# Patient Record
Sex: FEMALE | Race: WHITE | NOT HISPANIC OR LATINO | URBAN - METROPOLITAN AREA
[De-identification: names, ages, dates, MRNs, and addresses within clinical notes are randomized per-mention and may not be internally consistent; named-entity substitution may affect disease eponyms.]

---

## 2023-07-26 PROBLEM — Z91.018 ALLERGY TO TREE NUTS: Status: ACTIVE | Noted: 2018-07-09

## 2023-07-26 PROBLEM — L70.9 ACNE: Status: ACTIVE | Noted: 2021-11-17

## 2023-07-26 PROBLEM — S63.92XA SPRAIN OF LEFT HAND: Status: ACTIVE | Noted: 2023-07-26

## 2023-07-26 PROBLEM — R06.02 EXERCISE-INDUCED SHORTNESS OF BREATH: Status: ACTIVE | Noted: 2023-07-26

## 2023-07-26 PROBLEM — J30.1 ALLERGIC RHINITIS DUE TO POLLEN: Status: ACTIVE | Noted: 2023-07-26

## 2023-07-26 PROBLEM — M25.561 RIGHT KNEE PAIN: Status: ACTIVE | Noted: 2023-07-26

## 2023-07-26 PROBLEM — E66.9 OBESITY: Status: ACTIVE | Noted: 2021-11-17

## 2023-07-26 PROBLEM — R06.09 DYSPNEA ON EXERTION: Status: ACTIVE | Noted: 2018-07-09

## 2023-07-26 PROBLEM — L30.9 ECZEMA: Status: ACTIVE | Noted: 2018-07-09

## 2023-07-26 PROBLEM — N92.6 ABNORMAL MENSTRUAL CYCLE: Status: ACTIVE | Noted: 2021-11-17

## 2023-07-26 PROBLEM — J38.3 VOCAL CORD DYSFUNCTION: Status: ACTIVE | Noted: 2023-07-26

## 2023-07-26 RX ORDER — SULFAMETHOXAZOLE AND TRIMETHOPRIM 800; 160 MG/1; MG/1
TABLET ORAL
COMMUNITY
Start: 2019-07-08 | End: 2023-07-27 | Stop reason: ALTCHOICE

## 2023-07-26 RX ORDER — CETIRIZINE HYDROCHLORIDE 10 MG/1
TABLET ORAL
COMMUNITY

## 2023-07-26 RX ORDER — ALBUTEROL SULFATE 90 UG/1
AEROSOL, METERED RESPIRATORY (INHALATION)
COMMUNITY
End: 2023-07-27 | Stop reason: ALTCHOICE

## 2023-07-26 RX ORDER — EPINEPHRINE 0.3 MG/.3ML
INJECTION SUBCUTANEOUS
COMMUNITY
Start: 2021-11-02 | End: 2023-07-27 | Stop reason: SDUPTHER

## 2023-07-26 RX ORDER — ETONOGESTREL AND ETHINYL ESTRADIOL VAGINAL RING .015; .12 MG/D; MG/D
1 RING VAGINAL
COMMUNITY
Start: 2021-11-19 | End: 2023-11-14 | Stop reason: SDUPTHER

## 2023-07-26 RX ORDER — MONTELUKAST SODIUM 5 MG/1
TABLET, CHEWABLE ORAL
COMMUNITY
End: 2023-07-27 | Stop reason: ALTCHOICE

## 2023-07-26 RX ORDER — TRIAMCINOLONE ACETONIDE 1 MG/G
OINTMENT TOPICAL
COMMUNITY
Start: 2019-07-26 | End: 2023-07-27 | Stop reason: SDUPTHER

## 2023-07-26 RX ORDER — ETONOGESTREL AND ETHINYL ESTRADIOL VAGINAL RING .015; .12 MG/D; MG/D
RING VAGINAL
COMMUNITY
Start: 2021-10-23 | End: 2023-07-27 | Stop reason: ALTCHOICE

## 2023-07-27 ENCOUNTER — OFFICE VISIT (OUTPATIENT)
Dept: PRIMARY CARE | Facility: CLINIC | Age: 23
End: 2023-07-27
Payer: COMMERCIAL

## 2023-07-27 VITALS
WEIGHT: 216 LBS | HEART RATE: 86 BPM | TEMPERATURE: 98.7 F | OXYGEN SATURATION: 98 % | SYSTOLIC BLOOD PRESSURE: 116 MMHG | BODY MASS INDEX: 31.99 KG/M2 | DIASTOLIC BLOOD PRESSURE: 72 MMHG | HEIGHT: 69 IN

## 2023-07-27 DIAGNOSIS — L30.9 ECZEMA, UNSPECIFIED TYPE: ICD-10-CM

## 2023-07-27 DIAGNOSIS — Z23 NEED FOR TDAP VACCINATION: ICD-10-CM

## 2023-07-27 DIAGNOSIS — Z00.00 PHYSICAL EXAM: Primary | ICD-10-CM

## 2023-07-27 DIAGNOSIS — Z91.018 ALLERGY TO TREE NUTS: ICD-10-CM

## 2023-07-27 PROCEDURE — 90715 TDAP VACCINE 7 YRS/> IM: CPT | Performed by: INTERNAL MEDICINE

## 2023-07-27 PROCEDURE — 1036F TOBACCO NON-USER: CPT | Performed by: INTERNAL MEDICINE

## 2023-07-27 PROCEDURE — 99385 PREV VISIT NEW AGE 18-39: CPT | Performed by: INTERNAL MEDICINE

## 2023-07-27 PROCEDURE — 90471 IMMUNIZATION ADMIN: CPT | Performed by: INTERNAL MEDICINE

## 2023-07-27 RX ORDER — TRIAMCINOLONE ACETONIDE 1 MG/G
OINTMENT TOPICAL 2 TIMES DAILY
Qty: 80 G | Refills: 1 | Status: SHIPPED | OUTPATIENT
Start: 2023-07-27

## 2023-07-27 RX ORDER — EPINEPHRINE 0.3 MG/.3ML
INJECTION SUBCUTANEOUS
Qty: 2 EACH | Refills: 3 | Status: SHIPPED | OUTPATIENT
Start: 2023-07-27

## 2023-07-27 NOTE — PROGRESS NOTES
"SUBJECTIVE:   Jessica Perez is a 22 y.o. female presenting for her annual physical/wellness.  PCP: Srinath Weeks MD  Physical.  Doing well  Just graduated.  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  On bcp    Colon screening: na  Last pap: sees gyn  Last mammogram: na  Dexa na  Vaccines Up to date. Needs tdap  Diet:   healthy in general  Exercises:  started running, 3-4x per week.  Hx of R knee ACL injury (high school), so is being careful, also does yoga, stretching    No results found for: \"HGBA1C\"  No results found for: \"CREATININE\"  No results found for: \"WBC\", \"HGB\", \"HCT\", \"MCV\", \"PLT\"  Lab Results   Component Value Date    CHOL 132 08/12/2019     Lab Results   Component Value Date    HDL 62.3 08/12/2019     No results found for: \"LDLCALC\"  Lab Results   Component Value Date    TRIG 57 08/12/2019     No components found for: \"CHOLHDL\"       ROS:   Feeling well. No dyspnea or chest pain on exertion. No abdominal pain, change in bowel habits, black or bloody stools. No urinary tract or  symptoms.  No breast concerns. No neurological complaints.    OBJECTIVE:   The patient appears well, alert, oriented x 3, in no distress.   /72   Pulse 86   Temp 37.1 °C (98.7 °F)   Ht 1.74 m (5' 8.5\")   Wt 98 kg (216 lb)   SpO2 98%   BMI 32.36 kg/m²   ENT normal.  Neck supple. No adenopathy or thyromegaly. NOEMY. Lungs are clear, good air entry, no wheezes, rhonchi or rales. S1 and S2 normal, no murmurs, regular rate and rhythm. Abdomen is soft without tenderness, guarding, mass or organomegaly.  exam deferred to Gyn. Extremities show no edema, normal peripheral pulses. Neurological is normal without focal findings.      1. Physical exam  CBC, Comprehensive Metabolic Panel, Hemoglobin A1C, Lipid Panel      2. Allergy to tree nuts  EPINEPHrine 0.3 mg/0.3 mL injection syringe      3. Eczema, unspecified " type  triamcinolone (Kenalog) 0.1 % ointment         tdap

## 2023-11-12 RX ORDER — ALBUTEROL SULFATE 90 UG/1
2 AEROSOL, METERED RESPIRATORY (INHALATION) EVERY 6 HOURS PRN
COMMUNITY

## 2023-11-12 RX ORDER — MONTELUKAST SODIUM 5 MG/1
5 TABLET, CHEWABLE ORAL NIGHTLY
COMMUNITY

## 2023-11-14 ENCOUNTER — OFFICE VISIT (OUTPATIENT)
Dept: OBSTETRICS AND GYNECOLOGY | Facility: CLINIC | Age: 23
End: 2023-11-14
Payer: COMMERCIAL

## 2023-11-14 VITALS
HEIGHT: 69 IN | SYSTOLIC BLOOD PRESSURE: 130 MMHG | DIASTOLIC BLOOD PRESSURE: 63 MMHG | BODY MASS INDEX: 34.21 KG/M2 | WEIGHT: 231 LBS

## 2023-11-14 DIAGNOSIS — Z30.44 ENCOUNTER FOR SURVEILLANCE OF VAGINAL RING HORMONAL CONTRACEPTIVE DEVICE: ICD-10-CM

## 2023-11-14 DIAGNOSIS — Z01.419 WELL WOMAN EXAM: Primary | ICD-10-CM

## 2023-11-14 PROCEDURE — 99395 PREV VISIT EST AGE 18-39: CPT | Performed by: NURSE PRACTITIONER

## 2023-11-14 PROCEDURE — 87661 TRICHOMONAS VAGINALIS AMPLIF: CPT | Performed by: NURSE PRACTITIONER

## 2023-11-14 PROCEDURE — 1036F TOBACCO NON-USER: CPT | Performed by: NURSE PRACTITIONER

## 2023-11-14 PROCEDURE — 87800 DETECT AGNT MULT DNA DIREC: CPT | Performed by: NURSE PRACTITIONER

## 2023-11-14 RX ORDER — ETONOGESTREL AND ETHINYL ESTRADIOL VAGINAL RING .015; .12 MG/D; MG/D
1 RING VAGINAL
Qty: 1 EACH | Refills: 12 | Status: SHIPPED | OUTPATIENT
Start: 2023-11-14

## 2023-11-14 ASSESSMENT — ENCOUNTER SYMPTOMS
RESPIRATORY NEGATIVE: 1
ALLERGIC/IMMUNOLOGIC NEGATIVE: 1
PSYCHIATRIC NEGATIVE: 1
ENDOCRINE NEGATIVE: 1
HEMATOLOGIC/LYMPHATIC NEGATIVE: 1
CONSTITUTIONAL NEGATIVE: 1
CARDIOVASCULAR NEGATIVE: 1
MUSCULOSKELETAL NEGATIVE: 1
EYES NEGATIVE: 1
GASTROINTESTINAL NEGATIVE: 1
HEADACHES: 1

## 2023-11-14 ASSESSMENT — ANXIETY QUESTIONNAIRES
7. FEELING AFRAID AS IF SOMETHING AWFUL MIGHT HAPPEN: NOT AT ALL
4. TROUBLE RELAXING: NOT AT ALL
6. BECOMING EASILY ANNOYED OR IRRITABLE: NOT AT ALL
GAD7 TOTAL SCORE: 2
2. NOT BEING ABLE TO STOP OR CONTROL WORRYING: NOT AT ALL
IF YOU CHECKED OFF ANY PROBLEMS ON THIS QUESTIONNAIRE, HOW DIFFICULT HAVE THESE PROBLEMS MADE IT FOR YOU TO DO YOUR WORK, TAKE CARE OF THINGS AT HOME, OR GET ALONG WITH OTHER PEOPLE: NOT DIFFICULT AT ALL
3. WORRYING TOO MUCH ABOUT DIFFERENT THINGS: SEVERAL DAYS
1. FEELING NERVOUS, ANXIOUS, OR ON EDGE: SEVERAL DAYS
5. BEING SO RESTLESS THAT IT IS HARD TO SIT STILL: NOT AT ALL

## 2023-11-14 ASSESSMENT — PATIENT HEALTH QUESTIONNAIRE - PHQ9: 2. FEELING DOWN, DEPRESSED OR HOPELESS: SEVERAL DAYS

## 2023-11-14 NOTE — PROGRESS NOTES
"Aurora Bui Kelton, APRN-CNP     Subjective   Jessica Perez is a 23 y.o. female who presents for annual exam.   In the past year she has not had any surgical procedures or new health diagnoses.    She does have some complaint of discomfort with sexual intercourse.  She has not changed sexual partners.  There has been some vaginal dryness on occasion.    She has graduated from college with a degree in political science and is looking for a job.  Past Medical History:   Diagnosis Date    Allergic     Asthma     Eczema      Past Surgical History:   Procedure Laterality Date    WISDOM TOOTH EXTRACTION  2019       OB History          0    Para   0    Term   0       0    AB   0    Living   0         SAB   0    IAB   0    Ectopic   0    Multiple   0    Live Births   0               Patient's last menstrual period was 11/10/2023.      Review of Systems   Constitutional: Negative.    HENT: Negative.     Eyes: Negative.    Respiratory: Negative.     Cardiovascular: Negative.    Gastrointestinal: Negative.    Endocrine: Negative.    Genitourinary: Negative.    Musculoskeletal: Negative.    Skin: Negative.    Allergic/Immunologic: Negative.    Neurological:  Positive for headaches.   Hematological: Negative.    Psychiatric/Behavioral: Negative.     All other systems reviewed and are negative.    Breast: No Complaints   Vaginal: Dry        Objective   /63   Ht 1.753 m (5' 9\")   Wt 105 kg (231 lb)   LMP 11/10/2023   BMI 34.11 kg/m²   Physical Exam  Constitutional:       Appearance: Normal appearance.   Genitourinary:      Vulva and rectum normal.      No vaginal prolapse present.       Right Adnexa: no mass present.     Left Adnexa: no mass present.     Uterus is not enlarged, tender or irregular.   Breasts:     Breasts are soft.     Right: Normal.      Left: Normal.   Cardiovascular:      Rate and Rhythm: Normal rate.   Pulmonary:      Effort: Pulmonary effort is normal.      Breath " sounds: Normal breath sounds.   Musculoskeletal:         General: Normal range of motion.   Neurological:      General: No focal deficit present.      Mental Status: She is alert.   Skin:     General: Skin is warm and dry.   Vitals and nursing note reviewed.                 Assessment/Plan   Problem List Items Addressed This Visit    None  Visit Diagnoses         Codes    Well woman exam    -  Primary Z01.419    Relevant Orders    C. Trachomatis / N. Gonorrhoeae, Amplified Detection    TRICH VAGINALIS, AMPLIFIED    Encounter for surveillance of vaginal ring hormonal contraceptive device     Z30.44    Relevant Medications    etonogestreL-ethinyl estradioL (Nuvaring) 0.12-0.015 mg/24 hr vaginal ring

## 2023-11-16 LAB
C TRACH RRNA SPEC QL NAA+PROBE: NEGATIVE
N GONORRHOEA DNA SPEC QL PROBE+SIG AMP: NEGATIVE
T VAGINALIS RRNA SPEC QL NAA+PROBE: NEGATIVE

## 2023-11-21 DIAGNOSIS — M25.551 RIGHT HIP PAIN: Primary | ICD-10-CM

## 2023-11-22 ENCOUNTER — HOSPITAL ENCOUNTER (OUTPATIENT)
Dept: RADIOLOGY | Facility: HOSPITAL | Age: 23
Discharge: HOME | End: 2023-11-22
Payer: COMMERCIAL

## 2023-11-22 ENCOUNTER — OFFICE VISIT (OUTPATIENT)
Dept: ORTHOPEDIC SURGERY | Facility: HOSPITAL | Age: 23
End: 2023-11-22
Payer: COMMERCIAL

## 2023-11-22 VITALS — WEIGHT: 230 LBS | BODY MASS INDEX: 34.86 KG/M2 | HEIGHT: 68 IN

## 2023-11-22 DIAGNOSIS — M25.551 RIGHT HIP PAIN: ICD-10-CM

## 2023-11-22 DIAGNOSIS — M25.851 FEMOROACETABULAR IMPINGEMENT OF RIGHT HIP: Primary | ICD-10-CM

## 2023-11-22 PROCEDURE — 1036F TOBACCO NON-USER: CPT | Performed by: ORTHOPAEDIC SURGERY

## 2023-11-22 PROCEDURE — 99204 OFFICE O/P NEW MOD 45 MIN: CPT | Performed by: ORTHOPAEDIC SURGERY

## 2023-11-22 PROCEDURE — 73502 X-RAY EXAM HIP UNI 2-3 VIEWS: CPT | Mod: RT

## 2023-11-22 PROCEDURE — 99214 OFFICE O/P EST MOD 30 MIN: CPT | Performed by: ORTHOPAEDIC SURGERY

## 2023-11-22 PROCEDURE — 73502 X-RAY EXAM HIP UNI 2-3 VIEWS: CPT | Mod: RIGHT SIDE | Performed by: RADIOLOGY

## 2023-11-22 RX ORDER — MELOXICAM 15 MG/1
15 TABLET ORAL DAILY
Qty: 14 TABLET | Refills: 0 | Status: SHIPPED | OUTPATIENT
Start: 2023-11-22 | End: 2023-12-06

## 2023-11-22 ASSESSMENT — PAIN DESCRIPTION - DESCRIPTORS: DESCRIPTORS: SHARP

## 2023-11-22 ASSESSMENT — PAIN SCALES - GENERAL: PAINLEVEL_OUTOF10: 4

## 2023-11-22 ASSESSMENT — PAIN - FUNCTIONAL ASSESSMENT: PAIN_FUNCTIONAL_ASSESSMENT: 0-10

## 2023-11-29 NOTE — PROGRESS NOTES
HPI    This is a 23 y.o. female today complaining of right hip pain.  Pain has been present for 2 mos.  It is located deep in the groin and described as sharp, pinching, and achy.    They do complain of catching/clicking.  Pain is worse with prolonged sitting and increased activity.   They have utilized anti-inflammatories, rest, and ice for 2 mos  but their pain persists.  They rate their pain a 5 out of 10 on a daily basis.      ROS  Review of systems reviewed and pertinent positives mentioned in HPI.      PHYSICAL EXAM  There is not  pain with a resisted situp.    There is not abdominal distention or tenderness    The patient's range of motion reveals that they have 90° of hip flexion on the affected side.   Hip extension to 10°   Abduction to 45°      The patient is 5 out of 5 strength with resisted hip AB, adduction, hamstring and quadriceps testing.    No pain over the hip flexor, ASIS.  No pain over the proximal hamstring, piriformis      Pain Provacation testing:    Positive impingement sign,with a painful arc from 12 to 3:00.  Negative Psoas impingement/Kameron test  Negative instability, Log roll.  Positive subspine impingement test, which is pain with straight hip flexion.    Negative straight leg raise.  Negative circumduction clunk.      Peritrochanteric space examination:  Tenderness over the lon-trochanteric space - No  pain over the posterior trochanter - No      IMAGING  X-rays reviewed reveal no gross fracture or dislocation. and evidence of femoral acetabular impingement with an alpha angle of 70.  Acetabular index of 5.7  without acetabular retroversion.  Lateral center edge of 28.9.    MRI reviewed reveals No MRI available for review      ASSESSMENT  Right  femoral acetabular impingement      PLAN  This is a 23 y.o. female patient here today with significant hip pain.  We will have the patient initiate a course of physical therapy and continue NSAIDs and activity modification.  If symptoms  persist, we will see them back for re-evaluation.          Deacon Norton MD

## 2023-12-13 ENCOUNTER — EVALUATION (OUTPATIENT)
Dept: PHYSICAL THERAPY | Facility: CLINIC | Age: 23
End: 2023-12-13
Payer: COMMERCIAL

## 2023-12-13 DIAGNOSIS — M25.851 FEMOROACETABULAR IMPINGEMENT OF RIGHT HIP: Primary | ICD-10-CM

## 2023-12-13 PROCEDURE — 97110 THERAPEUTIC EXERCISES: CPT | Mod: GP | Performed by: PHYSICAL THERAPIST

## 2023-12-13 PROCEDURE — 97161 PT EVAL LOW COMPLEX 20 MIN: CPT | Mod: GP | Performed by: PHYSICAL THERAPIST

## 2023-12-13 ASSESSMENT — ENCOUNTER SYMPTOMS
LOSS OF SENSATION IN FEET: 0
OCCASIONAL FEELINGS OF UNSTEADINESS: 0
DEPRESSION: 0

## 2023-12-13 ASSESSMENT — COLUMBIA-SUICIDE SEVERITY RATING SCALE - C-SSRS
6. HAVE YOU EVER DONE ANYTHING, STARTED TO DO ANYTHING, OR PREPARED TO DO ANYTHING TO END YOUR LIFE?: NO
1. IN THE PAST MONTH, HAVE YOU WISHED YOU WERE DEAD OR WISHED YOU COULD GO TO SLEEP AND NOT WAKE UP?: NO
2. HAVE YOU ACTUALLY HAD ANY THOUGHTS OF KILLING YOURSELF?: NO

## 2023-12-13 NOTE — PROGRESS NOTES
"Physical Therapy Evaluation    Patient Name: Jessica Perez  MRN: [id number]  Today's Date: 12/13/2023  Visit: 1  Referred by: Dr. Norton  Diagnosis: DOO- 10-1-23  1. Femoroacetabular impingement of right hip          SUBJECTIVE:  23 y.o. english speaking female with R)>L) hip pain deep in the joint.  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  Pain started gradually and has progressed since.  3-4/10 pinching pain.  Mostly with flexing the hip past 90* or crossing legs.  (-) night pain    Pain:  Deep anterior hip joint pain R>L    Prior level of function:  Former HS and college  ([employer]).    OBJECTIVE:  ROM:  ER -R) 40, L) 50  IR- R) 40, L) 42  F- R) 95, L) 100  EXT- R) 10, L) 15  (+) FADIR bilaterally  (+) hip impingement 12-3:00 bilaterally  (-) ELIE  Resisted movements of hip were strong and painless except for ache with resisted hip flexion bilaterally and 4/5.  4/5 hip extension and ABD.  TTP over ASIS and hip flexors bilat.  Mild valgus collapse with single leg mini-squat.  Bilat.    Outcome Measure:  HOS- 76%    ASSESSMENT:  Pt. With painful limited ROM of R) hip, decreased tolerances for ADLs, and weakness in LE and core muscles.  She requires PT to address these issues and     TREATMENT:  - Therex:  Marshal test hip flexor S- 60\" ea.  Bent leg fallouts 15\" x 5 ea.  Planks x 30\"   Side planks x 20\" ea.  4-pt sit backs 10\" x 10  Prone hip ER/IR x 30  Bent knee prone hip EXT 2 x 10   SL clam shells 2 x 10 ea.    - Manual Therapy:    PATIENT EDUCATION:  HEP    PLAN:   Daily HEP.  Ice prn  Plan for weekly PT 1-2x/week x 8 weeks to work on pain free ROM along with core and LE strength.  Rehab potential: good  Plan of care agreement: Y    GOALS:  Active       PT Problem       Pt will have improved pain free ROM to assist with improving functional tolerances       Start:  12/13/23    Expected End:  03/01/24            Pt will have improved LE strength to assist " with improving functional tolerances       Start:  12/13/23    Expected End:  03/01/24            Pt will have improved HOS score by at least 15%       Start:  12/13/23    Expected End:  03/01/24            Pt will report improved ability with sustained postures and sustained ADLs       Start:  12/13/23    Expected End:  03/01/24            Pt will be independent with HEP       Start:  12/13/23    Expected End:  01/12/24            Pt will have decreased reports of pain by at least 2 levels using a VAS       Start:  12/13/23    Expected End:  01/19/24

## 2023-12-20 ENCOUNTER — TREATMENT (OUTPATIENT)
Dept: PHYSICAL THERAPY | Facility: CLINIC | Age: 23
End: 2023-12-20
Payer: COMMERCIAL

## 2023-12-20 DIAGNOSIS — M25.851 FEMOROACETABULAR IMPINGEMENT OF RIGHT HIP: ICD-10-CM

## 2023-12-20 PROCEDURE — 97110 THERAPEUTIC EXERCISES: CPT | Mod: GP | Performed by: PHYSICAL THERAPIST

## 2023-12-20 PROCEDURE — 97140 MANUAL THERAPY 1/> REGIONS: CPT | Mod: GP | Performed by: PHYSICAL THERAPIST

## 2023-12-20 NOTE — PROGRESS NOTES
"Physical Therapy Evaluation    Patient Name: Jessica Perez  MRN: [id number]  Today's Date: 12/20/2023  Visit: 2  Referred by: Dr. Norton  Diagnosis: DOO- 10-1-23  1. Femoroacetabular impingement of right hip  Follow Up In Physical Therapy        SUBJECTIVE:  23 y.o. female who returns for follow up for R)>L) hip pain deep in the joint.   3-4/10 pinching pain.  Symptoms are about the same.  Still mostly with flexing the hip past 90* or crossing legs.    HEP- Y  Pain:  Deep anterior hip joint pain R>L    Former HS and college  ([employer]).    OBJECTIVE:  ROM:  ER -R) 40, L) 50  IR- R) 40, L) 42  F- R) 95, L) 100  EXT- R) 10, L) 15  (+) FADIR bilaterally  (+) hip impingement 12-3:00 bilaterally  (-) ELIE  Resisted movements of hip were strong and painless except for ache with resisted hip flexion bilaterally and 4/5.  4/5 hip extension and ABD.  TTP over ASIS and hip flexors bilat.  Mild valgus collapse with single leg mini-squat.  Bilat.    Outcome Measure:  HOS- 76%    ASSESSMENT:  Pt. Tolerating ex. And treatment so far.  Tolerated today's session without increased pain reported during or immediately after session.    TREATMENT:  - Therex:  Elliptical L6 x 5 min.  Marshal test hip flexor S- 60\" ea.  Bent leg fallouts 15\" x 5 ea.  Planks x 30\" NP  Side planks x 20\" ea. NP  4-pt sit backs 10\" x 10   MultiHip EXT 7 2x10 ea.  MultiHip ABD 4 2x10 ea.  Prone hip ER/IR x 30 ea.  Prone MULE KICK hip EXT 2 x 15EA.   SL clam shells 2 x 15 ea.    - Manual Therapy:  IASTM to hip flexors and anterior hips  Belt lateral distraction mobilization to hips  P/A mob to both hips  PLAN:   Continue daily HEP.  Ice prn  F/U next week and continue to progress towards PT goals.  Rehab potential: good  Plan of care agreement: Y    GOALS:  Active       PT Problem       Pt will have improved pain free ROM to assist with improving functional tolerances       Start:  12/13/23    Expected End:  03/01/24            Pt will have " improved LE strength to assist with improving functional tolerances       Start:  12/13/23    Expected End:  03/01/24            Pt will have improved HOS score by at least 15%       Start:  12/13/23    Expected End:  03/01/24            Pt will report improved ability with sustained postures and sustained ADLs       Start:  12/13/23    Expected End:  03/01/24            Pt will be independent with HEP       Start:  12/13/23    Expected End:  01/12/24            Pt will have decreased reports of pain by at least 2 levels using a VAS       Start:  12/13/23    Expected End:  01/19/24

## 2023-12-28 ENCOUNTER — TREATMENT (OUTPATIENT)
Dept: PHYSICAL THERAPY | Facility: CLINIC | Age: 23
End: 2023-12-28
Payer: COMMERCIAL

## 2023-12-28 DIAGNOSIS — M25.851 FEMOROACETABULAR IMPINGEMENT OF RIGHT HIP: ICD-10-CM

## 2023-12-28 PROCEDURE — 97140 MANUAL THERAPY 1/> REGIONS: CPT | Mod: GP | Performed by: PHYSICAL THERAPIST

## 2023-12-28 PROCEDURE — 97110 THERAPEUTIC EXERCISES: CPT | Mod: GP | Performed by: PHYSICAL THERAPIST

## 2023-12-28 NOTE — PROGRESS NOTES
"Physical Therapy Evaluation    Patient Name: Jessica Perez  MRN: [id number]  Today's Date: 12/28/2023  Visit: 3  Referred by: Dr. Norton  Diagnosis: DOO- 10-1-23  1. Femoroacetabular impingement of right hip  Follow Up In Physical Therapy        SUBJECTIVE:  23 y.o. female who returns for follow up for R)>L) hip pain deep in the joint.  Symptoms have been better past week.  No pinching today.    Still mostly with flexing the hip past 90* or crossing legs.    HEP- Y  Pain:  Deep anterior hip joint pain R>L    Former HS and college  ([employer]).    OBJECTIVE:  (+) FADIR bilaterally  (+) hip impingement 12-3:00 bilaterally  Resisted movements of hip were strong and painless except for ache with resisted hip flexion bilaterally and 4/5.  4/5 hip extension and ABD.  TTP over ASIS and hip flexors bilat.    Outcome Measure:  HOS- 76%    ASSESSMENT:  Pt. With decreased symptoms reported today.  Pt. Tolerating ex. And treatment so far.  Tolerated today's session without increased pain reported during or immediately after session.    TREATMENT:  - Therex:  Elliptical L6 x 5 min.  Marshal test hip flexor S- 60\" ea.  Bent leg fallouts 15\" x 5 ea.  Planks x 30\" NP  Side planks x 20\" ea. NP  4-pt sit backs 10\" x 10   MultiHip EXT 7 2x10 ea.  MultiHip ABD 4 2x10 ea.  Prone MULE KICK hip EXT 3 x 12 EA.   SL clam shells   R  3 x 10 ea.  HL isometric hip flexion  15\" x 5  Slider retro lunges x 15 eliseo.      - Manual Therapy:  IASTM to hip flexors and anterior hips  Belt lateral distraction mobilization to hips  P/A mob to both hips  PLAN:   Continue daily HEP.  Start hip flexor Isometrics 2x/day.  F/U next week and continue to progress towards PT goals.    GOALS:  Active       PT Problem       Pt will have improved pain free ROM to assist with improving functional tolerances       Start:  12/13/23    Expected End:  03/01/24            Pt will have improved LE strength to assist with improving functional tolerances       " Start:  12/13/23    Expected End:  03/01/24            Pt will have improved HOS score by at least 15%       Start:  12/13/23    Expected End:  03/01/24            Pt will report improved ability with sustained postures and sustained ADLs       Start:  12/13/23    Expected End:  03/01/24            Pt will be independent with HEP       Start:  12/13/23    Expected End:  01/12/24            Pt will have decreased reports of pain by at least 2 levels using a VAS       Start:  12/13/23    Expected End:  01/19/24

## 2024-01-04 ENCOUNTER — TREATMENT (OUTPATIENT)
Dept: PHYSICAL THERAPY | Facility: CLINIC | Age: 24
End: 2024-01-04
Payer: COMMERCIAL

## 2024-01-04 DIAGNOSIS — M25.851 FEMOROACETABULAR IMPINGEMENT OF RIGHT HIP: Primary | ICD-10-CM

## 2024-01-04 PROCEDURE — 97140 MANUAL THERAPY 1/> REGIONS: CPT | Mod: GP | Performed by: PHYSICAL THERAPIST

## 2024-01-04 PROCEDURE — 97110 THERAPEUTIC EXERCISES: CPT | Mod: GP | Performed by: PHYSICAL THERAPIST

## 2024-01-04 NOTE — PROGRESS NOTES
"Physical Therapy Evaluation    Patient Name: Jessica Perez  MRN: [id number]  Today's Date: 1/4/2024  Visit: 4  Referred by: Dr. Norton  Diagnosis: DOO- 10-1-23  1. Femoroacetabular impingement of right hip          SUBJECTIVE:  23 y.o. female who returns for follow up for R)>L) hip pain deep in the joint.  Symptoms continue to feel better. Little pinching since last session.       HEP- Y  Pain:  Deep anterior hip joint pain R>L    Former HS and college  ([employer]).    OBJECTIVE:  (+) FADIR bilaterally  (+) hip impingement 12-3:00 bilaterally  Resisted movements of hip were strong and painless except for ache with resisted hip flexion bilaterally and 4/5.  4/5 hip extension and ABD.  TTP over ASIS and hip flexors bilat.    Outcome Measure:  HOS- 76%    ASSESSMENT:  Pt. Tolerating ex. And treatment so far.  Tolerated today's session without increased pain reported during or immediately after session.    TREATMENT:  - Therex:  Elliptical L6 x 5 min.  Marshal test hip flexor S- 60\" ea.  Strap prone quad S x 60\" ea.  Planks x 30\" NP  Side planks x 20\" ea. NP  4-pt sit backs 10\" x 10   SL clam shells   G  3 x 10 ea.  HL isometric hip flexion  15\" x 5 ea.  Airex SLSB knee flexed x 30 sec. X 2 ea.  MultiHip EXT 8 3x10 ea.  MultiHip ABD 5 3x10 ea.  SL DL 3x10 ea.  Slider retro lunges  3x10 eliseo.  TB lateral stepping  G  20' x 3 ea.      - Manual Therapy:  IASTM to hip flexors and anterior hips  Belt lateral distraction mobilization to hips  P/A mob to both hips  PLAN:   Continue daily HEP.  Start hip flexor Isometrics 2x/day.  F/U next week and continue to progress towards PT goals.    GOALS:  Active       PT Problem       Pt will have improved pain free ROM to assist with improving functional tolerances       Start:  12/13/23    Expected End:  03/01/24            Pt will have improved LE strength to assist with improving functional tolerances       Start:  12/13/23    Expected End:  03/01/24            Pt will " have improved HOS score by at least 15%       Start:  12/13/23    Expected End:  03/01/24            Pt will report improved ability with sustained postures and sustained ADLs       Start:  12/13/23    Expected End:  03/01/24            Pt will be independent with HEP       Start:  12/13/23    Expected End:  01/12/24            Pt will have decreased reports of pain by at least 2 levels using a VAS       Start:  12/13/23    Expected End:  01/19/24

## 2024-01-17 ENCOUNTER — OFFICE VISIT (OUTPATIENT)
Dept: ORTHOPEDIC SURGERY | Facility: HOSPITAL | Age: 24
End: 2024-01-17
Payer: COMMERCIAL

## 2024-01-17 DIAGNOSIS — M25.851 FEMOROACETABULAR IMPINGEMENT OF RIGHT HIP: ICD-10-CM

## 2024-01-17 PROCEDURE — 99213 OFFICE O/P EST LOW 20 MIN: CPT | Performed by: ORTHOPAEDIC SURGERY

## 2024-01-17 PROCEDURE — 1036F TOBACCO NON-USER: CPT | Performed by: ORTHOPAEDIC SURGERY

## 2024-01-17 ASSESSMENT — PAIN - FUNCTIONAL ASSESSMENT: PAIN_FUNCTIONAL_ASSESSMENT: 0-10

## 2024-01-17 ASSESSMENT — PAIN SCALES - GENERAL: PAINLEVEL_OUTOF10: 3

## 2024-01-17 ASSESSMENT — PAIN DESCRIPTION - DESCRIPTORS: DESCRIPTORS: ACHING;SHARP

## 2024-01-23 NOTE — PROGRESS NOTES
Patient returns to see me today physical therapy and utilizing anti-inflammatories over the last several months that has failed to respond to these appropriately.  She has known femoral acetabular impingement and is interested in proceeding with an MRI arthrogram to further evaluate the integrity of her labrum.  We will plan to order this imaging study and see her back after it is performed for further conversations regarding surgical intervention.

## 2024-02-22 ENCOUNTER — APPOINTMENT (OUTPATIENT)
Dept: RADIOLOGY | Facility: HOSPITAL | Age: 24
End: 2024-02-22
Payer: COMMERCIAL

## 2024-03-13 ENCOUNTER — HOSPITAL ENCOUNTER (OUTPATIENT)
Dept: RADIOLOGY | Facility: HOSPITAL | Age: 24
Discharge: HOME | End: 2024-03-13
Payer: COMMERCIAL

## 2024-03-13 DIAGNOSIS — M25.851 FEMOROACETABULAR IMPINGEMENT OF RIGHT HIP: ICD-10-CM

## 2024-03-13 PROCEDURE — 73525 CONTRAST X-RAY OF HIP: CPT | Mod: RT

## 2024-03-13 PROCEDURE — 27093 INJECTION FOR HIP X-RAY: CPT | Mod: RIGHT SIDE | Performed by: RADIOLOGY

## 2024-03-13 PROCEDURE — 2500000005 HC RX 250 GENERAL PHARMACY W/O HCPCS

## 2024-03-13 PROCEDURE — A9575 INJ GADOTERATE MEGLUMI 0.1ML: HCPCS | Performed by: ORTHOPAEDIC SURGERY

## 2024-03-13 PROCEDURE — 73722 MRI JOINT OF LWR EXTR W/DYE: CPT | Mod: RIGHT SIDE | Performed by: STUDENT IN AN ORGANIZED HEALTH CARE EDUCATION/TRAINING PROGRAM

## 2024-03-13 PROCEDURE — 2550000001 HC RX 255 CONTRASTS: Performed by: ORTHOPAEDIC SURGERY

## 2024-03-13 PROCEDURE — 77002 NEEDLE LOCALIZATION BY XRAY: CPT | Mod: RIGHT SIDE | Performed by: RADIOLOGY

## 2024-03-13 PROCEDURE — 73722 MRI JOINT OF LWR EXTR W/DYE: CPT | Mod: RT

## 2024-03-13 RX ORDER — GADOTERATE MEGLUMINE 376.9 MG/ML
0.1 INJECTION INTRAVENOUS
Status: COMPLETED | OUTPATIENT
Start: 2024-03-13 | End: 2024-03-13

## 2024-03-13 RX ORDER — LIDOCAINE HYDROCHLORIDE 10 MG/ML
INJECTION INFILTRATION; PERINEURAL
Status: COMPLETED
Start: 2024-03-13 | End: 2024-03-13

## 2024-03-13 RX ADMIN — LIDOCAINE HYDROCHLORIDE 5 ML: 10 INJECTION, SOLUTION INFILTRATION; PERINEURAL at 15:01

## 2024-03-13 RX ADMIN — GADOTERATE MEGLUMINE 0.1 ML: 376.9 INJECTION INTRAVENOUS at 15:01

## 2024-03-13 RX ADMIN — IOHEXOL 5 ML: 240 INJECTION, SOLUTION INTRATHECAL; INTRAVASCULAR; INTRAVENOUS; ORAL at 15:01

## 2024-03-13 NOTE — POST-PROCEDURE NOTE
Interventional Radiology Brief Postprocedure Note    Attending: Faiza De Dios    Assistant: n/a    Diagnosis: pain    Description of procedure: The risks, benefits and alternatives of the procedure were discussed with the patient. The patient was given the chance to ask questions, and all were answered prior to proceeding. At this time, written informed consent was obtained.      The patient was placed in the supine position on the fluoroscopic table and was prepped and draped in the usual sterile fashion. The right hip joint was localized under fluoroscopy. Lidocaine was used for local anesthesia. Under fluoroscopic guidance a 20 gauge needle was inserted into the right hip joint. Approximately 10 ML of a solution containing lidocaine, Omnipaque 240 iodinated contrast and Multihance gadolinium contrast was injected into the right hip joint.      The patient tolerated the procedure well and no immediate complication was noted.      Total fluoroscopy time was [8 seconds.].     Anesthesia:  Local    Complications: None    Estimated Blood Loss: none    Medications (Filter: Administrations occurring from 1637 to 1637 on 03/13/24) As of 03/13/24 1637      None          No specimens collected      See detailed result report with images in PACS.    The patient tolerated the procedure well without incident or complication and is in stable condition.

## 2024-04-10 ENCOUNTER — OFFICE VISIT (OUTPATIENT)
Dept: ORTHOPEDIC SURGERY | Facility: HOSPITAL | Age: 24
End: 2024-04-10
Payer: COMMERCIAL

## 2024-04-10 DIAGNOSIS — S73.191A ACETABULAR LABRUM TEAR, RIGHT, INITIAL ENCOUNTER: ICD-10-CM

## 2024-04-10 PROCEDURE — L1686 HO POST-OP HIP ABDUCTION: HCPCS | Performed by: ORTHOPAEDIC SURGERY

## 2024-04-10 PROCEDURE — 99213 OFFICE O/P EST LOW 20 MIN: CPT | Performed by: ORTHOPAEDIC SURGERY

## 2024-04-10 ASSESSMENT — PAIN SCALES - GENERAL: PAINLEVEL_OUTOF10: 3

## 2024-04-10 ASSESSMENT — PAIN - FUNCTIONAL ASSESSMENT: PAIN_FUNCTIONAL_ASSESSMENT: 0-10

## 2024-04-10 ASSESSMENT — PAIN DESCRIPTION - DESCRIPTORS: DESCRIPTORS: SHARP;SHOOTING

## 2024-04-11 NOTE — PROGRESS NOTES
HPI    23 y.o. female here today for follow up on Right hip pain.  She has failed conservative management for her back I do not close anti-inflammatories and activity for at least 6 months.  She recently got an MRI scan and is here to review the results and options for her hip        IMAGING  X-rays reviewed reveal no gross fracture or dislocation. and evidence of femoral acetabular impingement with an alpha angle of 70.  Acetabular index of 5.7  without acetabular retroversion.  Lateral center edge of 28.9.   MRI - tearing of the acetabular labrum as well as potential cartilaginous loose body and some early delamination of the articular cartilage on the acetabular side but no full-thickness defect noted        ASSESSMENT  femoral acetabular impingement        PLAN  Patient has exhausted conservative management including therapeutic exercises, activity modification, NSAIDS.  They continue to have worsening deep groin pain with mechanical symptoms affecting ADLs.  Patient would like to proceed with surgery entailing a hip arthroscopy, acetabuloplasty for acetabular retroversion, labral repair, femoral osteoplasty, loose body removal for possible cartilaginous loose body seen on MRI, and capsular plication for mild hip instability.    We discussed the risks and benefits of surgery which included but were not limited to bleeding, infection, damage to nerves, damage to blood vessels, need for further procedures, risks of anesthesia, heterotopic ossification, femoral neck stress fracture, avascular necrosis of the femoral head, pudendal nerve palsy iatrogenic instability progression of osteoarthritis.    Patient was prescribed a hinged hip brace for AMOL/labral tear.  The patient is ambulatory with or without aid; but, has weakness, instability and/or deformity of their right hip which requires stabilization from this orthosis to improve their function.      Verbal and written instructions for the use, wear schedule,  cleaning and application of this item were given.  Patient was instructed that should the brace result in increased pain, decreased sensation, increased swelling, or an overall worsening of their medical condition, to please contact our office immediately.     Patient was prescribed crutches for AMOL/labral tear.  This mobility device is required for the following reasons:    1. The patient has a mobility limitation that significantly impairs their ability to participate in one or more mobility-related activities of daily living (MRADL) in the home; and  2. The patient is able to safely use the mobility device; and  3. The functional mobility deficit can be sufficiently resolved with use of the mobility device    Verbal and written instructions for the use, wear schedule, cleaning and application of this item were given.  Education provided also included gait training and safety precautions when using this device. Patient was instructed that should the item result in increased pain, decreased sensation, increased swelling, or an overall worsening of their medical condition, to please contact our office immediately.    Orthotic management and training was provided for skin care, modifications due to healing tissues, edema changes, interruption in skin integrity, and safety precautions with the orthosis.           Deacon Norton MD

## 2024-04-15 ENCOUNTER — LAB (OUTPATIENT)
Dept: LAB | Facility: LAB | Age: 24
End: 2024-04-15
Payer: COMMERCIAL

## 2024-04-15 DIAGNOSIS — S73.191A ACETABULAR LABRUM TEAR, RIGHT, INITIAL ENCOUNTER: ICD-10-CM

## 2024-04-15 PROCEDURE — 36415 COLL VENOUS BLD VENIPUNCTURE: CPT

## 2024-04-15 PROCEDURE — 84702 CHORIONIC GONADOTROPIN TEST: CPT

## 2024-04-15 PROCEDURE — 80053 COMPREHEN METABOLIC PANEL: CPT

## 2024-04-15 PROCEDURE — 85025 COMPLETE CBC W/AUTO DIFF WBC: CPT

## 2024-04-16 LAB
ALBUMIN SERPL BCP-MCNC: 4.3 G/DL (ref 3.4–5)
ALP SERPL-CCNC: 52 U/L (ref 33–110)
ALT SERPL W P-5'-P-CCNC: 20 U/L (ref 7–45)
ANION GAP SERPL CALC-SCNC: 11 MMOL/L (ref 10–20)
AST SERPL W P-5'-P-CCNC: 18 U/L (ref 9–39)
B-HCG SERPL-ACNC: <3 MIU/ML
BASOPHILS # BLD AUTO: 0.08 X10*3/UL (ref 0–0.1)
BASOPHILS NFR BLD AUTO: 0.8 %
BILIRUB SERPL-MCNC: 0.4 MG/DL (ref 0–1.2)
BUN SERPL-MCNC: 10 MG/DL (ref 6–23)
CALCIUM SERPL-MCNC: 9.5 MG/DL (ref 8.6–10.6)
CHLORIDE SERPL-SCNC: 103 MMOL/L (ref 98–107)
CO2 SERPL-SCNC: 28 MMOL/L (ref 21–32)
CREAT SERPL-MCNC: 0.65 MG/DL (ref 0.5–1.05)
EGFRCR SERPLBLD CKD-EPI 2021: >90 ML/MIN/1.73M*2
EOSINOPHIL # BLD AUTO: 0.16 X10*3/UL (ref 0–0.7)
EOSINOPHIL NFR BLD AUTO: 1.5 %
ERYTHROCYTE [DISTWIDTH] IN BLOOD BY AUTOMATED COUNT: 12.3 % (ref 11.5–14.5)
GLUCOSE SERPL-MCNC: 84 MG/DL (ref 74–99)
HCT VFR BLD AUTO: 40.6 % (ref 36–46)
HGB BLD-MCNC: 13.2 G/DL (ref 12–16)
IMM GRANULOCYTES # BLD AUTO: 0.05 X10*3/UL (ref 0–0.7)
IMM GRANULOCYTES NFR BLD AUTO: 0.5 % (ref 0–0.9)
LYMPHOCYTES # BLD AUTO: 2.83 X10*3/UL (ref 1.2–4.8)
LYMPHOCYTES NFR BLD AUTO: 27 %
MCH RBC QN AUTO: 29.4 PG (ref 26–34)
MCHC RBC AUTO-ENTMCNC: 32.5 G/DL (ref 32–36)
MCV RBC AUTO: 90 FL (ref 80–100)
MONOCYTES # BLD AUTO: 0.77 X10*3/UL (ref 0.1–1)
MONOCYTES NFR BLD AUTO: 7.4 %
NEUTROPHILS # BLD AUTO: 6.58 X10*3/UL (ref 1.2–7.7)
NEUTROPHILS NFR BLD AUTO: 62.8 %
NRBC BLD-RTO: 0 /100 WBCS (ref 0–0)
PLATELET # BLD AUTO: 346 X10*3/UL (ref 150–450)
POTASSIUM SERPL-SCNC: 3.8 MMOL/L (ref 3.5–5.3)
PROT SERPL-MCNC: 7.1 G/DL (ref 6.4–8.2)
RBC # BLD AUTO: 4.49 X10*6/UL (ref 4–5.2)
SODIUM SERPL-SCNC: 138 MMOL/L (ref 136–145)
WBC # BLD AUTO: 10.5 X10*3/UL (ref 4.4–11.3)

## 2024-04-23 RX ORDER — SODIUM CHLORIDE, SODIUM LACTATE, POTASSIUM CHLORIDE, CALCIUM CHLORIDE 600; 310; 30; 20 MG/100ML; MG/100ML; MG/100ML; MG/100ML
100 INJECTION, SOLUTION INTRAVENOUS CONTINUOUS
Status: CANCELLED | OUTPATIENT
Start: 2024-04-23

## 2024-04-25 ENCOUNTER — HOSPITAL ENCOUNTER (OUTPATIENT)
Facility: HOSPITAL | Age: 24
Setting detail: OUTPATIENT SURGERY
Discharge: HOME | End: 2024-04-25
Attending: ORTHOPAEDIC SURGERY | Admitting: ORTHOPAEDIC SURGERY
Payer: COMMERCIAL

## 2024-04-25 ENCOUNTER — ANESTHESIA EVENT (OUTPATIENT)
Dept: OPERATING ROOM | Facility: HOSPITAL | Age: 24
End: 2024-04-25
Payer: COMMERCIAL

## 2024-04-25 ENCOUNTER — APPOINTMENT (OUTPATIENT)
Dept: RADIOLOGY | Facility: HOSPITAL | Age: 24
End: 2024-04-25
Payer: COMMERCIAL

## 2024-04-25 ENCOUNTER — ANESTHESIA (OUTPATIENT)
Dept: OPERATING ROOM | Facility: HOSPITAL | Age: 24
End: 2024-04-25
Payer: COMMERCIAL

## 2024-04-25 VITALS
WEIGHT: 224.43 LBS | SYSTOLIC BLOOD PRESSURE: 130 MMHG | BODY MASS INDEX: 33.24 KG/M2 | DIASTOLIC BLOOD PRESSURE: 75 MMHG | RESPIRATION RATE: 15 BRPM | TEMPERATURE: 97.9 F | HEART RATE: 65 BPM | OXYGEN SATURATION: 99 % | HEIGHT: 69 IN

## 2024-04-25 DIAGNOSIS — S73.191A TEAR OF RIGHT ACETABULAR LABRUM: Primary | ICD-10-CM

## 2024-04-25 LAB — PREGNANCY TEST URINE, POC: NEGATIVE

## 2024-04-25 PROCEDURE — 29914 HIP ARTHRO W/FEMOROPLASTY: CPT | Performed by: PHYSICIAN ASSISTANT

## 2024-04-25 PROCEDURE — 2500000001 HC RX 250 WO HCPCS SELF ADMINISTERED DRUGS (ALT 637 FOR MEDICARE OP): Performed by: PHYSICIAN ASSISTANT

## 2024-04-25 PROCEDURE — 7100000009 HC PHASE TWO TIME - INITIAL BASE CHARGE: Performed by: ORTHOPAEDIC SURGERY

## 2024-04-25 PROCEDURE — 29999 UNLISTED PX ARTHROSCOPY: CPT | Performed by: ORTHOPAEDIC SURGERY

## 2024-04-25 PROCEDURE — 7100000002 HC RECOVERY ROOM TIME - EACH INCREMENTAL 1 MINUTE: Performed by: ORTHOPAEDIC SURGERY

## 2024-04-25 PROCEDURE — 76000 FLUOROSCOPY <1 HR PHYS/QHP: CPT | Mod: RT

## 2024-04-25 PROCEDURE — 7100000010 HC PHASE TWO TIME - EACH INCREMENTAL 1 MINUTE: Performed by: ORTHOPAEDIC SURGERY

## 2024-04-25 PROCEDURE — 3600000004 HC OR TIME - INITIAL BASE CHARGE - PROCEDURE LEVEL FOUR: Performed by: ORTHOPAEDIC SURGERY

## 2024-04-25 PROCEDURE — 29861 HIP ARTHRO W/FB REMOVAL: CPT | Performed by: PHYSICIAN ASSISTANT

## 2024-04-25 PROCEDURE — 81025 URINE PREGNANCY TEST: CPT | Performed by: PHYSICIAN ASSISTANT

## 2024-04-25 PROCEDURE — C1713 ANCHOR/SCREW BN/BN,TIS/BN: HCPCS | Performed by: ORTHOPAEDIC SURGERY

## 2024-04-25 PROCEDURE — 29999 UNLISTED PX ARTHROSCOPY: CPT | Performed by: PHYSICIAN ASSISTANT

## 2024-04-25 PROCEDURE — A29914 PR ARTHROSCOPY HIP W/FEMOROPLASTY: Performed by: ANESTHESIOLOGY

## 2024-04-25 PROCEDURE — 64450 NJX AA&/STRD OTHER PN/BRANCH: CPT | Performed by: ANESTHESIOLOGY

## 2024-04-25 PROCEDURE — 2500000005 HC RX 250 GENERAL PHARMACY W/O HCPCS: Performed by: ANESTHESIOLOGY

## 2024-04-25 PROCEDURE — 2500000004 HC RX 250 GENERAL PHARMACY W/ HCPCS (ALT 636 FOR OP/ED): Performed by: ANESTHESIOLOGY

## 2024-04-25 PROCEDURE — 29915 HIP ARTHRO ACETABULOPLASTY: CPT | Performed by: PHYSICIAN ASSISTANT

## 2024-04-25 PROCEDURE — 3600000009 HC OR TIME - EACH INCREMENTAL 1 MINUTE - PROCEDURE LEVEL FOUR: Performed by: ORTHOPAEDIC SURGERY

## 2024-04-25 PROCEDURE — 3700000002 HC GENERAL ANESTHESIA TIME - EACH INCREMENTAL 1 MINUTE: Performed by: ORTHOPAEDIC SURGERY

## 2024-04-25 PROCEDURE — 3700000001 HC GENERAL ANESTHESIA TIME - INITIAL BASE CHARGE: Performed by: ORTHOPAEDIC SURGERY

## 2024-04-25 PROCEDURE — 29915 HIP ARTHRO ACETABULOPLASTY: CPT | Performed by: ORTHOPAEDIC SURGERY

## 2024-04-25 PROCEDURE — 2500000004 HC RX 250 GENERAL PHARMACY W/ HCPCS (ALT 636 FOR OP/ED): Performed by: ORTHOPAEDIC SURGERY

## 2024-04-25 PROCEDURE — 2500000004 HC RX 250 GENERAL PHARMACY W/ HCPCS (ALT 636 FOR OP/ED): Performed by: ANESTHESIOLOGIST ASSISTANT

## 2024-04-25 PROCEDURE — 2780000003 HC OR 278 NO HCPCS: Performed by: ORTHOPAEDIC SURGERY

## 2024-04-25 PROCEDURE — 2720000007 HC OR 272 NO HCPCS: Performed by: ORTHOPAEDIC SURGERY

## 2024-04-25 PROCEDURE — A29914 PR ARTHROSCOPY HIP W/FEMOROPLASTY: Performed by: ANESTHESIOLOGIST ASSISTANT

## 2024-04-25 PROCEDURE — 29914 HIP ARTHRO W/FEMOROPLASTY: CPT | Performed by: ORTHOPAEDIC SURGERY

## 2024-04-25 PROCEDURE — 7100000001 HC RECOVERY ROOM TIME - INITIAL BASE CHARGE: Performed by: ORTHOPAEDIC SURGERY

## 2024-04-25 PROCEDURE — 29861 HIP ARTHRO W/FB REMOVAL: CPT | Performed by: ORTHOPAEDIC SURGERY

## 2024-04-25 PROCEDURE — 2500000005 HC RX 250 GENERAL PHARMACY W/O HCPCS: Performed by: ANESTHESIOLOGIST ASSISTANT

## 2024-04-25 DEVICE — SUTURE, ANCHOR, 2.8MM, Q-FIX, ALL: Type: IMPLANTABLE DEVICE | Site: HIP | Status: FUNCTIONAL

## 2024-04-25 DEVICE — NANOTACK TT SUTURE ANCHOR, 1.4MM WITH 1.2MM XBRAID TT
Type: IMPLANTABLE DEVICE | Site: HIP | Status: FUNCTIONAL
Brand: NANOTACK

## 2024-04-25 DEVICE — IMPLANTABLE DEVICE: Type: IMPLANTABLE DEVICE | Site: HIP | Status: FUNCTIONAL

## 2024-04-25 RX ORDER — SODIUM CHLORIDE, SODIUM LACTATE, POTASSIUM CHLORIDE, CALCIUM CHLORIDE 600; 310; 30; 20 MG/100ML; MG/100ML; MG/100ML; MG/100ML
100 INJECTION, SOLUTION INTRAVENOUS CONTINUOUS
Status: CANCELLED | OUTPATIENT
Start: 2024-04-25

## 2024-04-25 RX ORDER — SODIUM CHLORIDE, SODIUM LACTATE, POTASSIUM CHLORIDE, CALCIUM CHLORIDE 600; 310; 30; 20 MG/100ML; MG/100ML; MG/100ML; MG/100ML
100 INJECTION, SOLUTION INTRAVENOUS CONTINUOUS
Status: DISCONTINUED | OUTPATIENT
Start: 2024-04-25 | End: 2024-04-25 | Stop reason: HOSPADM

## 2024-04-25 RX ORDER — IPRATROPIUM BROMIDE 0.5 MG/2.5ML
500 SOLUTION RESPIRATORY (INHALATION) ONCE
Status: DISCONTINUED | OUTPATIENT
Start: 2024-04-25 | End: 2024-04-25 | Stop reason: HOSPADM

## 2024-04-25 RX ORDER — MORPHINE SULFATE 0.5 MG/ML
INJECTION, SOLUTION EPIDURAL; INTRATHECAL; INTRAVENOUS AS NEEDED
Status: DISCONTINUED | OUTPATIENT
Start: 2024-04-25 | End: 2024-04-25 | Stop reason: HOSPADM

## 2024-04-25 RX ORDER — ACETAMINOPHEN 325 MG/1
975 TABLET ORAL ONCE
Status: COMPLETED | OUTPATIENT
Start: 2024-04-25 | End: 2024-04-25

## 2024-04-25 RX ORDER — PROPOFOL 10 MG/ML
INJECTION, EMULSION INTRAVENOUS AS NEEDED
Status: DISCONTINUED | OUTPATIENT
Start: 2024-04-25 | End: 2024-04-25

## 2024-04-25 RX ORDER — OXYCODONE AND ACETAMINOPHEN 5; 325 MG/1; MG/1
1 TABLET ORAL SEE ADMIN INSTRUCTIONS
Qty: 16 TABLET | Refills: 0 | Status: SHIPPED | OUTPATIENT
Start: 2024-04-25 | End: 2024-04-28

## 2024-04-25 RX ORDER — OXYCODONE HYDROCHLORIDE 5 MG/1
5 TABLET ORAL EVERY 4 HOURS PRN
Status: DISCONTINUED | OUTPATIENT
Start: 2024-04-25 | End: 2024-04-25 | Stop reason: HOSPADM

## 2024-04-25 RX ORDER — LIDOCAINE HYDROCHLORIDE 20 MG/ML
INJECTION, SOLUTION INFILTRATION; PERINEURAL AS NEEDED
Status: DISCONTINUED | OUTPATIENT
Start: 2024-04-25 | End: 2024-04-25

## 2024-04-25 RX ORDER — PROPOFOL 10 MG/ML
INJECTION, EMULSION INTRAVENOUS CONTINUOUS PRN
Status: DISCONTINUED | OUTPATIENT
Start: 2024-04-25 | End: 2024-04-25

## 2024-04-25 RX ORDER — ONDANSETRON HYDROCHLORIDE 2 MG/ML
4 INJECTION, SOLUTION INTRAVENOUS ONCE AS NEEDED
Status: DISCONTINUED | OUTPATIENT
Start: 2024-04-25 | End: 2024-04-25 | Stop reason: HOSPADM

## 2024-04-25 RX ORDER — METHOCARBAMOL 100 MG/ML
INJECTION, SOLUTION INTRAMUSCULAR; INTRAVENOUS AS NEEDED
Status: DISCONTINUED | OUTPATIENT
Start: 2024-04-25 | End: 2024-04-25

## 2024-04-25 RX ORDER — GABAPENTIN 300 MG/1
600 CAPSULE ORAL ONCE
Status: COMPLETED | OUTPATIENT
Start: 2024-04-25 | End: 2024-04-25

## 2024-04-25 RX ORDER — ACETAMINOPHEN 325 MG/1
650 TABLET ORAL EVERY 4 HOURS PRN
Status: DISCONTINUED | OUTPATIENT
Start: 2024-04-25 | End: 2024-04-25 | Stop reason: HOSPADM

## 2024-04-25 RX ORDER — ROCURONIUM BROMIDE 10 MG/ML
INJECTION, SOLUTION INTRAVENOUS AS NEEDED
Status: DISCONTINUED | OUTPATIENT
Start: 2024-04-25 | End: 2024-04-25

## 2024-04-25 RX ORDER — DROPERIDOL 2.5 MG/ML
0.62 INJECTION, SOLUTION INTRAMUSCULAR; INTRAVENOUS ONCE AS NEEDED
Status: DISCONTINUED | OUTPATIENT
Start: 2024-04-25 | End: 2024-04-25 | Stop reason: HOSPADM

## 2024-04-25 RX ORDER — LIDOCAINE HYDROCHLORIDE 10 MG/ML
0.1 INJECTION, SOLUTION EPIDURAL; INFILTRATION; INTRACAUDAL; PERINEURAL ONCE
Status: DISCONTINUED | OUTPATIENT
Start: 2024-04-25 | End: 2024-04-25 | Stop reason: HOSPADM

## 2024-04-25 RX ORDER — ALBUTEROL SULFATE 0.83 MG/ML
2.5 SOLUTION RESPIRATORY (INHALATION) ONCE AS NEEDED
Status: DISCONTINUED | OUTPATIENT
Start: 2024-04-25 | End: 2024-04-25 | Stop reason: HOSPADM

## 2024-04-25 RX ORDER — HYDROMORPHONE HYDROCHLORIDE 1 MG/ML
INJECTION, SOLUTION INTRAMUSCULAR; INTRAVENOUS; SUBCUTANEOUS AS NEEDED
Status: DISCONTINUED | OUTPATIENT
Start: 2024-04-25 | End: 2024-04-25

## 2024-04-25 RX ORDER — ONDANSETRON 4 MG/1
4 TABLET, FILM COATED ORAL EVERY 8 HOURS PRN
Qty: 30 TABLET | Refills: 0 | Status: SHIPPED | OUTPATIENT
Start: 2024-04-25 | End: 2024-05-05

## 2024-04-25 RX ORDER — MIDAZOLAM HYDROCHLORIDE 5 MG/ML
INJECTION INTRAMUSCULAR; INTRAVENOUS AS NEEDED
Status: DISCONTINUED | OUTPATIENT
Start: 2024-04-25 | End: 2024-04-25

## 2024-04-25 RX ORDER — CEFAZOLIN 1 G/1
INJECTION, POWDER, FOR SOLUTION INTRAVENOUS AS NEEDED
Status: DISCONTINUED | OUTPATIENT
Start: 2024-04-25 | End: 2024-04-25

## 2024-04-25 RX ORDER — METHOCARBAMOL 750 MG/1
750 TABLET, FILM COATED ORAL 4 TIMES DAILY
Qty: 30 TABLET | Refills: 0 | Status: SHIPPED | OUTPATIENT
Start: 2024-04-25 | End: 2024-05-10

## 2024-04-25 RX ORDER — NAPROXEN SODIUM 220 MG/1
81 TABLET, FILM COATED ORAL 2 TIMES DAILY
Qty: 28 TABLET | Refills: 0 | Status: SHIPPED | OUTPATIENT
Start: 2024-04-25 | End: 2024-05-09

## 2024-04-25 RX ORDER — BUPIVACAINE HYDROCHLORIDE 5 MG/ML
INJECTION, SOLUTION EPIDURAL; INTRACAUDAL AS NEEDED
Status: DISCONTINUED | OUTPATIENT
Start: 2024-04-25 | End: 2024-04-25 | Stop reason: HOSPADM

## 2024-04-25 RX ORDER — FENTANYL CITRATE 50 UG/ML
INJECTION, SOLUTION INTRAMUSCULAR; INTRAVENOUS AS NEEDED
Status: DISCONTINUED | OUTPATIENT
Start: 2024-04-25 | End: 2024-04-25

## 2024-04-25 RX ORDER — ONDANSETRON HYDROCHLORIDE 2 MG/ML
INJECTION, SOLUTION INTRAVENOUS AS NEEDED
Status: DISCONTINUED | OUTPATIENT
Start: 2024-04-25 | End: 2024-04-25

## 2024-04-25 RX ORDER — KETOROLAC TROMETHAMINE 30 MG/ML
INJECTION, SOLUTION INTRAMUSCULAR; INTRAVENOUS AS NEEDED
Status: DISCONTINUED | OUTPATIENT
Start: 2024-04-25 | End: 2024-04-25

## 2024-04-25 RX ORDER — SODIUM CHLORIDE, SODIUM LACTATE, POTASSIUM CHLORIDE, AND CALCIUM CHLORIDE .6; .31; .03; .02 G/100ML; G/100ML; G/100ML; G/100ML
IRRIGANT IRRIGATION AS NEEDED
Status: DISCONTINUED | OUTPATIENT
Start: 2024-04-25 | End: 2024-04-25 | Stop reason: HOSPADM

## 2024-04-25 RX ORDER — INDOMETHACIN 75 MG/1
75 CAPSULE, EXTENDED RELEASE ORAL DAILY
Qty: 10 CAPSULE | Refills: 0 | Status: SHIPPED | OUTPATIENT
Start: 2024-04-25 | End: 2024-05-05

## 2024-04-25 RX ADMIN — ROCURONIUM BROMIDE 20 MG: 10 INJECTION INTRAVENOUS at 12:24

## 2024-04-25 RX ADMIN — FENTANYL CITRATE 100 MCG: 50 INJECTION, SOLUTION INTRAMUSCULAR; INTRAVENOUS at 09:50

## 2024-04-25 RX ADMIN — HYDROMORPHONE HYDROCHLORIDE 1 MG: 1 INJECTION, SOLUTION INTRAMUSCULAR; INTRAVENOUS; SUBCUTANEOUS at 13:39

## 2024-04-25 RX ADMIN — HYDROMORPHONE HYDROCHLORIDE 0.5 MG: 1 INJECTION, SOLUTION INTRAMUSCULAR; INTRAVENOUS; SUBCUTANEOUS at 14:34

## 2024-04-25 RX ADMIN — ROCURONIUM BROMIDE 20 MG: 10 INJECTION INTRAVENOUS at 11:16

## 2024-04-25 RX ADMIN — ACETAMINOPHEN 975 MG: 325 TABLET ORAL at 09:24

## 2024-04-25 RX ADMIN — FENTANYL CITRATE 50 MCG: 50 INJECTION, SOLUTION INTRAMUSCULAR; INTRAVENOUS at 12:35

## 2024-04-25 RX ADMIN — PROPOFOL 200 MG: 10 INJECTION, EMULSION INTRAVENOUS at 10:09

## 2024-04-25 RX ADMIN — HYDROMORPHONE HYDROCHLORIDE 0.5 MG: 1 INJECTION, SOLUTION INTRAMUSCULAR; INTRAVENOUS; SUBCUTANEOUS at 14:28

## 2024-04-25 RX ADMIN — SUGAMMADEX 200 MG: 100 INJECTION, SOLUTION INTRAVENOUS at 13:40

## 2024-04-25 RX ADMIN — LIDOCAINE HYDROCHLORIDE 50 MG: 20 INJECTION, SOLUTION INFILTRATION; PERINEURAL at 10:09

## 2024-04-25 RX ADMIN — METHOCARBAMOL 1000 MG: 100 INJECTION INTRAMUSCULAR; INTRAVENOUS at 13:31

## 2024-04-25 RX ADMIN — DEXAMETHASONE SODIUM PHOSPHATE 4 MG: 4 INJECTION, SOLUTION INTRAMUSCULAR; INTRAVENOUS at 10:20

## 2024-04-25 RX ADMIN — Medication 3 L/MIN: at 15:00

## 2024-04-25 RX ADMIN — ROCURONIUM BROMIDE 60 MG: 10 INJECTION INTRAVENOUS at 10:09

## 2024-04-25 RX ADMIN — HYDROMORPHONE HYDROCHLORIDE 0.5 MG: 1 INJECTION, SOLUTION INTRAMUSCULAR; INTRAVENOUS; SUBCUTANEOUS at 14:41

## 2024-04-25 RX ADMIN — PROPOFOL 25 MCG/KG/MIN: 10 INJECTION, EMULSION INTRAVENOUS at 10:15

## 2024-04-25 RX ADMIN — SODIUM CHLORIDE, SODIUM LACTATE, POTASSIUM CHLORIDE, AND CALCIUM CHLORIDE: 600; 310; 30; 20 INJECTION, SOLUTION INTRAVENOUS at 10:05

## 2024-04-25 RX ADMIN — ROCURONIUM BROMIDE 20 MG: 10 INJECTION INTRAVENOUS at 10:30

## 2024-04-25 RX ADMIN — MIDAZOLAM 10 MG: 5 INJECTION INTRAMUSCULAR; INTRAVENOUS at 09:50

## 2024-04-25 RX ADMIN — GABAPENTIN 600 MG: 300 CAPSULE ORAL at 09:24

## 2024-04-25 RX ADMIN — KETOROLAC TROMETHAMINE 30 MG: 30 INJECTION, SOLUTION INTRAMUSCULAR at 13:31

## 2024-04-25 RX ADMIN — FENTANYL CITRATE 100 MCG: 50 INJECTION, SOLUTION INTRAMUSCULAR; INTRAVENOUS at 10:09

## 2024-04-25 RX ADMIN — ONDANSETRON 4 MG: 2 INJECTION INTRAMUSCULAR; INTRAVENOUS at 13:31

## 2024-04-25 RX ADMIN — CEFAZOLIN 2 G: 1 INJECTION, POWDER, FOR SOLUTION INTRAMUSCULAR; INTRAVENOUS at 10:10

## 2024-04-25 RX ADMIN — FENTANYL CITRATE 50 MCG: 50 INJECTION, SOLUTION INTRAMUSCULAR; INTRAVENOUS at 12:32

## 2024-04-25 ASSESSMENT — PAIN DESCRIPTION - LOCATION
LOCATION: HIP

## 2024-04-25 ASSESSMENT — ENCOUNTER SYMPTOMS
CARDIOVASCULAR NEGATIVE: 1
PSYCHIATRIC NEGATIVE: 1
CONSTITUTIONAL NEGATIVE: 1
ARTHRALGIAS: 1
EYES NEGATIVE: 1
NEUROLOGICAL NEGATIVE: 1
GASTROINTESTINAL NEGATIVE: 1
RESPIRATORY NEGATIVE: 1

## 2024-04-25 ASSESSMENT — PAIN - FUNCTIONAL ASSESSMENT
PAIN_FUNCTIONAL_ASSESSMENT: 0-10

## 2024-04-25 ASSESSMENT — PAIN DESCRIPTION - ORIENTATION
ORIENTATION: RIGHT

## 2024-04-25 ASSESSMENT — COLUMBIA-SUICIDE SEVERITY RATING SCALE - C-SSRS
2. HAVE YOU ACTUALLY HAD ANY THOUGHTS OF KILLING YOURSELF?: NO
2. HAVE YOU ACTUALLY HAD ANY THOUGHTS OF KILLING YOURSELF?: NO
6. HAVE YOU EVER DONE ANYTHING, STARTED TO DO ANYTHING, OR PREPARED TO DO ANYTHING TO END YOUR LIFE?: NO
1. IN THE PAST MONTH, HAVE YOU WISHED YOU WERE DEAD OR WISHED YOU COULD GO TO SLEEP AND NOT WAKE UP?: NO
6. HAVE YOU EVER DONE ANYTHING, STARTED TO DO ANYTHING, OR PREPARED TO DO ANYTHING TO END YOUR LIFE?: NO
1. IN THE PAST MONTH, HAVE YOU WISHED YOU WERE DEAD OR WISHED YOU COULD GO TO SLEEP AND NOT WAKE UP?: NO

## 2024-04-25 ASSESSMENT — PAIN SCALES - GENERAL
PAINLEVEL_OUTOF10: 7
PAINLEVEL_OUTOF10: 8
PAINLEVEL_OUTOF10: 2
PAINLEVEL_OUTOF10: 3
PAINLEVEL_OUTOF10: 8
PAINLEVEL_OUTOF10: 3
PAINLEVEL_OUTOF10: 2
PAINLEVEL_OUTOF10: 8
PAINLEVEL_OUTOF10: 8
PAINLEVEL_OUTOF10: 0 - NO PAIN
PAINLEVEL_OUTOF10: 2

## 2024-04-25 NOTE — BRIEF OP NOTE
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]     Procedures  Right Hip Arthroscopy; Rim Trim; Labral reconstruction with allograft; Osteoplasty; Capsular Plication  96654 - DC ARTHROSCOPY HIP W/FEMOROPLASTY    DC ARTHROSCOPY HIP W/ACETABULOPLASTY [29898]  DC ARTHROSCOPY HIP W/LABRAL REPAIR [26324]  DC ARTHROSCOPY HIP SURGICAL W/REMOVAL LOOSE/FB [66695]  DC UNLISTED PROCEDURE ARTHROSCOPY [26395]  Surgeons      * Deacon Norton - Primary    Resident/Fellow/Other Assistant:  Surgeons and Role:     * Real Woodson MD - Resident - Assisting     * Ashley Bradley MD - Resident - Assisting     * Yisel Lo PA-C - [facility] Assist    Procedure Summary  Anesthesia: General  ASA: II  Anesthesia Staff: Anesthesiologist: Mark Gagnon MD  CRNA: CRISTOPHER Vázquez-CRNA  C-AA: RENETTA Serrano  Estimated Blood Loss: 5mL  Intra-op Medications:   Administrations occurring from 0930 to 1200 on 24:   Medication Name Total Dose   EPINEPHrine (Adrenalin) 3 mg in lactated Ringer's 9,000 mL irrigation 3 mg              Anesthesia Record               Intraprocedure I/O Totals          Intake    Propofol Drip 0.00 mL    The total shown is the total volume documented since Anesthesia Start was filed.    Total Intake 0 mL          Specimen: No specimens collected     Staff:   Circulator: Jonathan Murdock RN; Kori Aldana RN; Venice Rossi RN  Relief Scrub: Bebe Li  Scrub Person: Gabby Willson RN; Digna Lin          Findings: right hip labral tear with calcified labrum, femoral acetabular  impingement    Complications:  None; patient tolerated the procedure well.     Disposition: PACU - hemodynamically stable.  Condition: stable  Specimens Collected: No specimens collected  Attending Attestation: Dr. Norton was present and scrubbed for all critical portions of the procedure    Deacon Norton  Phone Number: [phone]

## 2024-04-25 NOTE — ANESTHESIA POSTPROCEDURE EVALUATION
Patient: Jessica Perez    Procedure Summary       Date: 04/25/24 Room / Location: [facility] A OR 18 / Virtual [facility] OR    Anesthesia Start: 1003 Anesthesia Stop: 1404    Procedure: Right Hip Arthroscopy; Rim Trim; Labral reconstruction with allograft; Osteoplasty; Capsular Plication (Right: Hip) Diagnosis:       Tear of right acetabular labrum, initial encounter      Femoroacetabular impingement of right hip      (Tear of right acetabular labrum, initial encounter [S73.191A])      (Femoroacetabular impingement of right hip [M25.851])    Surgeons: Deacon Norton MD Responsible Provider: Mark Gagnon MD    Anesthesia Type: general, regional ASA Status: 2            Anesthesia Type: general, regional    Vitals Value Taken Time   BP  04/25/24 1408   Temp  04/25/24 1408   Pulse  04/25/24 1408   Resp  04/25/24 1408   SpO2  04/25/24 1408       Anesthesia Post Evaluation    Patient location during evaluation: bedside  Patient participation: complete - patient participated  Level of consciousness: awake  Pain management: adequate  Airway patency: patent  Cardiovascular status: acceptable  Respiratory status: acceptable  Hydration status: acceptable  Postoperative Nausea and Vomiting: none        No notable events documented.

## 2024-04-25 NOTE — ANESTHESIA PROCEDURE NOTES
Peripheral Block    Patient location during procedure: pre-op  Start time: 4/25/2024 9:38 AM  End time: 4/25/2024 9:46 AM  Reason for block: procedure for pain, at surgeon's request and post-op pain management  Staffing  Performed: attending   Authorized by: Mark Gagnon MD    Performed by: Mark Gagnon MD  Preanesthetic Checklist  Completed: patient identified, IV checked, site marked, risks and benefits discussed, surgical consent, monitors and equipment checked, pre-op evaluation and timeout performed   Timeout performed at: 4/25/2024 9:38 AM  Peripheral Block  Patient position: left lateral decubitus  Prep: alcohol swabs  Patient monitoring: continuous pulse ox  Block type: QL  Injection technique: single-shot  Guidance: ultrasound guided  Local infiltration: lidocaine  Needle  Needle type: short-bevel   Needle gauge: 22 G  Needle length: 8 cm  Needle localization: ultrasound guidance     image stored in chart  Test dose: negative  Assessment  Injection assessment: negative aspiration for heme, no paresthesia on injection, incremental injection and local visualized surrounding nerve on ultrasound  Heart rate change: no  Slow fractionated injection: yes  Additional Notes  40 ml 0.375% bupivacaine with 1:200K epi and 10 mg dexamethasone injected

## 2024-04-25 NOTE — H&P
History Of Present Illness  Jessica Perez 23 y.o. female presenting with right hip pain.  AMOL on xray and labral tear on MRI.  Exhausted conservative management.   Pain affecting ADLs.  They would like to proceed with surgical intervention.          Past Medical History  Past Medical History:   Diagnosis Date    Allergic 2008    Asthma ([facility]) 2001    Eczema 2012       Surgical History  Past Surgical History:   Procedure Laterality Date    WISDOM TOOTH EXTRACTION  6/2019        Social History  She reports that she has never smoked. She has never used smokeless tobacco. She reports current alcohol use of about 5.0 standard drinks of alcohol per week. She reports that she does not use drugs.    Family History  Family History   Problem Relation Name Age of Onset    Skin cancer Mother      Hyperlipidemia Father      Skin cancer Mother's Sister      Skin cancer Mother's Brother Rohan Urias     Diabetes Maternal Grandfather Rohan Urias     Hypertension Maternal Grandfather Rohan Urias     Skin cancer Maternal Grandfather Rohan Bridgett         Allergies  Tree nut and Tree nuts    Review of Systems   Constitutional: Negative.    HENT: Negative.     Eyes: Negative.    Respiratory: Negative.     Cardiovascular: Negative.    Gastrointestinal: Negative.    Genitourinary: Negative.    Musculoskeletal:  Positive for arthralgias.        Rt hip   Skin: Negative.    Neurological: Negative.    Psychiatric/Behavioral: Negative.     All other systems reviewed and are negative.       Physical Exam  Constitutional:       Appearance: Normal appearance.   HENT:      Head: Normocephalic and atraumatic.   Eyes:      Extraocular Movements: Extraocular movements intact.   Pulmonary:      Effort: Pulmonary effort is normal.   Musculoskeletal:         General: Signs of injury present.      Cervical back: Neck supple.      Comments: Right hip pain with ROM   Skin:     General: Skin is warm and dry.   Neurological:      " General: No focal deficit present.      Mental Status: She is alert and oriented to person, place, and time.   Psychiatric:         Mood and Affect: Mood normal.          Last Recorded Vitals  Blood pressure 141/71, pulse 93, temperature 37.1 °C (98.8 °F), temperature source Temporal, resp. rate 16, height 1.753 m (5' 9\"), weight 102 kg (224 lb 6.9 oz), last menstrual period 04/08/2024, SpO2 98%.    Relevant Results          Assessment and Plan  right hip pain, labral tear, AMOL  right hip scope, labral repair, osteoplasty and rim trim, capsular plication        Yisel Lo PA-C    "

## 2024-04-25 NOTE — ANESTHESIA PREPROCEDURE EVALUATION
Patient: Jessica Perez    Procedure Information       Date/Time: 04/25/24 0930    Procedure: Right Hip Arthroscopy; Rim Trim; Labral Repair; Osteoplasty; Capsular Plication (Right: Hip)    Location: [facility] A OR 18 / Virtual [facility] A OR    Surgeons: Deacon Norton MD            Relevant Problems   Pulmonary   (+) Dyspnea on exertion   (+) Exercise-induced shortness of breath      Endocrine   (+) Obesity      Skin   (+) Eczema       Clinical information reviewed:   Tobacco  Allergies  Meds   Med Hx  Surg Hx  OB Status  Fam Hx  Soc   Hx        NPO Detail:  NPO/Void Status  Date of Last Liquid: 04/25/24  Time of Last Liquid: 0630  Date of Last Solid: 04/24/24  Time of Last Solid: 2100         Physical Exam    Airway  Mallampati: II     Cardiovascular   Rhythm: regular  Rate: normal     Dental    Pulmonary   Breath sounds clear to auscultation     Abdominal            Anesthesia Plan    History of general anesthesia?: yes  History of complications of general anesthesia?: no    ASA 2     general and regional     intravenous induction   Anesthetic plan and risks discussed with patient.    Plan discussed with CAA.

## 2024-04-25 NOTE — ANESTHESIA PROCEDURE NOTES
Airway  Date/Time: 4/25/2024 10:12 AM  Urgency: elective      Staffing  Performed: RENETTA   Authorized by: Mark Gagnon MD    Performed by: RENETTA Serrano  Patient location during procedure: OR    Indications and Patient Condition  Indications for airway management: anesthesia  Spontaneous Ventilation: absent  Sedation level: deep  Preoxygenated: yes  Mask difficulty assessment: 1 - vent by mask    Final Airway Details  Final airway type: endotracheal airway      Successful airway: ETT  Cuffed: yes   Successful intubation technique: direct laryngoscopy  Blade: Wilbert  Blade size: #3  ETT size (mm): 7.0  Cormack-Lehane Classification: grade I - full view of glottis  Placement verified by: chest auscultation   Number of attempts at approach: 1

## 2024-04-25 NOTE — SIGNIFICANT EVENT
Discharge teaching done with mom and pt with good understanding.  Pt assist to dress.  Geno well.  Pt to the bathroom via cart.  Used walker with steady gait and 50% weight bearing right leg.  Geno well.  Return to bed

## 2024-04-26 ENCOUNTER — EVALUATION (OUTPATIENT)
Dept: PHYSICAL THERAPY | Facility: HOSPITAL | Age: 24
End: 2024-04-26
Payer: COMMERCIAL

## 2024-04-26 DIAGNOSIS — Z47.89 ORTHOPEDIC AFTERCARE: Primary | ICD-10-CM

## 2024-04-26 DIAGNOSIS — S73.191A ACETABULAR LABRUM TEAR, RIGHT, INITIAL ENCOUNTER: ICD-10-CM

## 2024-04-26 DIAGNOSIS — R29.898 WEAKNESS OF RIGHT HIP: ICD-10-CM

## 2024-04-26 DIAGNOSIS — M25.551 RIGHT HIP PAIN: ICD-10-CM

## 2024-04-26 DIAGNOSIS — M25.651 HIP STIFFNESS, RIGHT: ICD-10-CM

## 2024-04-26 PROCEDURE — 97161 PT EVAL LOW COMPLEX 20 MIN: CPT | Mod: GP | Performed by: PHYSICAL THERAPIST

## 2024-04-26 PROCEDURE — 97110 THERAPEUTIC EXERCISES: CPT | Mod: GP | Performed by: PHYSICAL THERAPIST

## 2024-04-26 PROCEDURE — 97535 SELF CARE MNGMENT TRAINING: CPT | Mod: GP | Performed by: PHYSICAL THERAPIST

## 2024-04-26 PROCEDURE — 97140 MANUAL THERAPY 1/> REGIONS: CPT | Mod: GP | Performed by: PHYSICAL THERAPIST

## 2024-04-26 ASSESSMENT — PAIN SCALES - GENERAL: PAINLEVEL_OUTOF10: 6

## 2024-04-26 NOTE — PROGRESS NOTES
Physical Therapy  Physical Therapy Orthopedic Evaluation    Patient Name: Jessica Perez  MRN: [id number]  Today's Date: 4/26/2024       Insurance:  Visit number: 1 of 49  Authorization info: no auth  Insurance Type: anthem    General:  Reason for visit: Post Op Right Hip Arthroscopy; Rim Trim; Labral reconstruction with allograft; Osteoplasty; Capsular Plication   Referred by: Dr. Errol CHAMBERS    Current Problem  1. Orthopedic aftercare        2. Acetabular labrum tear, right, initial encounter  Referral to Physical Therapy      3. Right hip pain        4. Hip stiffness, right        5. Weakness of right hip            Precautions: No active ER> 20 degrees x 3 weeks, WBAT with crutches, and Avoid hip flexion isometrics x 4 weeks       Medical History Form: Reviewed (scanned into chart)    Subjective:     Chief Complaint: Patient presents to clinic s/p Right Hip Arthroscopy; Rim Trim; Labral reconstruction with allograft; Osteoplasty; Capsular Plication by Dr. Errol CHAMBERS. Pt is currently ambulating with bilateral axillary crutches and brace. Pt states she has been able to perform stairs at home. Pt has yet to start CPM. Pt states pain is manageable with medications    Surgery Date: 4/25/2024  MAYELA: Chronic    Current Condition:   Same    Pain:  Pain Score: 6  Pain Type: Surgical pain  Pain Location: Hip  Location: right hip  Description: ache/sharp  Aggravating Factors: Standing and Walking  Relieving Factors:  Rest and Ice    Relevant Information (PMH & Previous Tests/Imaging): x-rays  Previous Interventions/Treatments: Physical Therapy    Prior Level of Function (PLOF)  Patient previously independent with all ADLs  Exercise/Physical Activity: played hockey previously  Work/School: centralized      Patients Living Environment: Reviewed and no concern    Primary Language: English    Patient's Goal(s) for Therapy: to return to active lifestyle    Red Flags: Do you have any of the following?  No  Fever/chills, unexplained weight changes, dizziness/fainting, unexplained change in bowel or bladder functions, unexplained malaise or muscle weakness, night pain/sweats, numbness or tingling  Signs of DVT including: Pain, swelling or tenderness to calf, warm or red skin, previous history of DVT    Objective:  Patient presents to clinic ambulating with bilateral axillary crutches     Surgical sight inspected: No signs of infection; Stitches intact and wound healing well.        ROM    Knee AROM (Degrees)      (R)  (L)  Flexion: WNL  WNL   Extension: WNL  WNL      Hip PROM (Degrees)      (R)  (L)  Flexion: 30*  120   Extension: 0  0  Abduction: NT  45  Adduction: NT  NT  ER:  20  40  IR:  20  35    Hip AROM (Degrees)  *Will be assessed at future visit secondary to post op precautions        STRENGTH TESTING  *No strength testing performed secondary to patient being post-op. Will be assessed at follow up visit.     Palpation: hypertonicity hip flexors, adductors, TFL          Outcome Measures:      EDUCATION: home exercise program, plan of care, activity modifications, pain management, and injury pathology         Goals: Set and discussed today      Plan of care was developed with input and agreement by the patient      Treatment Performed:    Therapeutic Exercise:    15 min  Access Code: [email or web address]  URL: [email or web address]  Date: 4/26/2023  Prepared by: Edward Cornell     Exercises  - Caregiver PROM Hip Internal External Rotation  - 3-5 x daily - 7 x weekly - 20 reps  - Caregiver PROM Hip Circumduction at Neutral  - 3-5 x daily - 7 x weekly - 20 reps  - Supine Gluteal Sets  - 5 x daily - 7 x weekly - 3 sets - 10 reps - 5 hold  - Supine Quad Set  - 5 x daily - 7 x weekly - 3 sets - 10 reps - 5 hold  - Hip Flexion Stretch  - 3 x daily - 7 x weekly - 3 sets - 10 reps - 5 hold  - Supine Ankle Pumps  - 8-10 x daily - 7 x weekly - 1 sets - 20 reps    Manual Therapy:    10 min  PROM hip flexion  PROM  log rolling      Self care Home Mangement:     15 min  Dressing change   Ports clean and no signs of infection  Pt educated on showering with water proof band aides      Assessment: Patient presents to clinic s/p Right Hip Arthroscopy; Rim Trim; Labral reconstruction with allograft; Osteoplasty; Capsular Plication . Clinical examination reveals pain, edema, and impaired mobility; strength, balance, and functional mobility to be assessed at future visit secondary to post op status. Patient is currently limited in all functional mobility; unable to ambulate, negotiate stairs, squat, kneel or participate in previous active lifestyle secondary to surgery. Patient will benefit from skilled PT intervention to return to all ADLs, community ambulation and recreational activities symptom free.         Plan:     Planned Interventions include: therapeutic exercise, self-care home management, manual therapy, therapeutic activities, gait training, neuromuscular coordination, vasopneumatic, dry needling, aquatic therapy  Frequency: 2 x Week  Duration: 6 Months      Edward Cornell, PT

## 2024-04-29 ENCOUNTER — TREATMENT (OUTPATIENT)
Dept: PHYSICAL THERAPY | Facility: HOSPITAL | Age: 24
End: 2024-04-29
Payer: COMMERCIAL

## 2024-04-29 ENCOUNTER — APPOINTMENT (OUTPATIENT)
Dept: PHYSICAL THERAPY | Facility: HOSPITAL | Age: 24
End: 2024-04-29
Payer: COMMERCIAL

## 2024-04-29 DIAGNOSIS — S73.191A ACETABULAR LABRUM TEAR, RIGHT, INITIAL ENCOUNTER: Primary | ICD-10-CM

## 2024-04-29 DIAGNOSIS — R29.898 WEAKNESS OF RIGHT HIP: ICD-10-CM

## 2024-04-29 DIAGNOSIS — M25.551 RIGHT HIP PAIN: ICD-10-CM

## 2024-04-29 DIAGNOSIS — Z47.89 ORTHOPEDIC AFTERCARE: ICD-10-CM

## 2024-04-29 DIAGNOSIS — M25.651 HIP STIFFNESS, RIGHT: ICD-10-CM

## 2024-04-29 DIAGNOSIS — M25.851 FEMOROACETABULAR IMPINGEMENT OF RIGHT HIP: ICD-10-CM

## 2024-04-29 PROCEDURE — 97140 MANUAL THERAPY 1/> REGIONS: CPT | Mod: GP | Performed by: PHYSICAL THERAPIST

## 2024-04-29 PROCEDURE — 97110 THERAPEUTIC EXERCISES: CPT | Mod: GP | Performed by: PHYSICAL THERAPIST

## 2024-04-29 ASSESSMENT — PAIN SCALES - GENERAL: PAINLEVEL_OUTOF10: 3

## 2024-04-30 NOTE — OP NOTE
Right Hip Arthroscopy; Rim Trim; Labral reconstruction with allograft; Osteoplasty; Capsular Plication (R) Operative Note     Date: 2024  OR Location: [facility] OR    Name: Jessica Perez, : 2000, Age: 23 y.o., MRN: [id number], Sex: female    PREOPERATIVE DIAGNOSIS:   1. right hip mixed type femoral acetabular impingement.   2. Acetabular labral tear.   3. Intra-articular loose bodies, one of which required separate   cannula for its removal.   4. Mild hip instability noted on exam under anesthesia.       POSTOPERATIVE DIAGNOSIS:   1. right hip mixed type femoral acetabular impingement.   2. Acetabular labral tearing that was extensive with severe calcification of labrum with non-repairable labral tissue necessitating labral reconstruction.   3. Intra-articular loose bodies, one of which required separate   cannula for its removal.   4. Mild hip instability noted on exam under anesthesia.   5. Grade II changes superior acetabular rim from 12-3 o'clock  6. Grade II changes diffuse femoral head      OPERATION/PROCEDURE:   1. right hip arthroscopy with arthroscopic rim trim subspinous   decompression as a separate and identifiable procedure for pincer and   subspinous impingement.   2. Acetabular labral reconstruction with IT band allograft    3. Intra-articular loose body removal.   4. Femoral osteochondroplasty for CAM lesion.   5. Capsular plication.       SURGEON:   Deacon Norton MD.       ASSISTANT(S):   First assistant; Yisel Lo PA-C.  Please note that we will be   billing for my physician's assistant as she was critical and   necessary for successful completion of this case including limb   positioning, anchor placement, suture management, and wound closure.       TRACTION TIME:   Less than 1 hour.       INDICATION FOR PROCEDURE:   Pleasant patient presented to my office with   persistent right-sided hip pain that had failed conservative   management.  Advanced imaging had revealed a  labral tear in the   setting of mixed impingement.  Risks and benefits of surgery have   been discussed with the patient including, but not limited to,   bleeding, infection, damage to nerves or blood vessels, need for   further procedures, risks of anesthesia, blood clots, progression of   osteoarthritis, incomplete pain relief, heterotopic ossification,   pudendal nerve palsy, lateral femoral cutaneous nerve palsy,   avascular necrosis requiring hip replacement.  The patient understood   these risks and wished to proceed with the operative intervention.       PROCEDURE AND FINDINGS:   The patient was identified in the preoperative holding area.   Operative extremity was marked with an indelible marker.  Informed   consent was reviewed.  Patient was taken to the operating room where a   time-out was performed verifying correct site, side, procedure, and   our special equipment.   They were placed supine on the operating room   table.  All bony prominences were well padded.  Patient was then prepped   and draped in usual sterile fashion after anesthesia induced was   without difficulty.  Once the patient was prepped and draped, the hip   was distracted via postless technique.  Standard anterolateral   arthroscopy portal was created.  A modified mid anterior portal   created.  A capsulotomy was performed.  Combination of a shaver and   radiofrequency device used to clean off the superior acetabular rim   identifying the patient's pincer and subspinous impingement, this was   taken down with a bur in the standard fashion as a separate and   identifiable procedure.  The labrum was noted to be severely damaged and calcified.  Every attempt was made to preserve the patients native labrum but the tissue was too calcified and damaged to repair.  The remainder of the damaged tissue was resected and 7 anchors were then placed close to the rim.  The end sutures were used to measure the labral defect which was a 5.5cm segment.   On the back table we prepared the IT band allograft for labral reconstruction.  The graft was fixated at the ends and parachuted into the joint.  The interval anchors were passed in a looped fashion and this secured the labral graft to the acetabular rim nicely.  We noted excellent   fixation of labrum.  A few intra-articular loose bodies were removed   arthroscopically.  Head was reduced.  We noted excellent resolution   of the patient's suction seal.  We identified and protected the   lateral retinacular vessels throughout the remainder of the case.  An   osteoplasty was then performed from the medial to lateral synovial   fold and proximally and distally to the extent of lesion.  Dynamic   examination revealed no residual impingement.  An x-ray showed good   sphericity on multiple views.  We then performed a capsular plication   of the interportal capsulotomy with multiple sutures, tying these   down with alternating half hitches and clipping them with the knot.   We drained the hip, withdrew the arthroscope, closed the portals with   interrupted sutures, applied a sterile bandage.  The patient was   awoken from anesthesia without complication, transported to PACU in   stable condition.  Postoperative course will be standard hip   arthroscopy protocol.           Deacon Norton MD

## 2024-04-30 NOTE — PROGRESS NOTES
Physical Therapy  Physical Therapy Treatment Note    Patient Name: Jessica Perez  MRN: [id number]  Today's Date: 4/29/2024       Insurance:  Visit number: 2 of 49  Authorization info: no auth  Insurance Type: anthem     General:  Reason for visit: Post Op Right Hip Arthroscopy; Rim Trim; Labral reconstruction with allograft; Osteoplasty; Capsular Plication   Referred by: Dr. Errol CHAMBERS    Surgery Date: 4/25/2024    Precautions: No active ER> 20 degrees x 3 weeks, WBAT with crutches, and Avoid hip flexion isometrics x 4 weeks    Current Problem  1. Acetabular labrum tear, right, initial encounter        2. Orthopedic aftercare        3. Right hip pain        4. Hip stiffness, right        5. Weakness of right hip        6. Femoroacetabular impingement of right hip            Subjective:     Patient reports pt arrived to therapy reporting she is feeling better than previous session. Pt reports less pain and improved mobility. Pt reports compliance with CPM but not to duration prescribed. She is currently at 34 deg    Pain  Pain Score: 3  Pain Type: Surgical pain  Pain Location: Hip    Performing HEP?: Yes        Objective:   Hip PROM (Degrees)                             (R)                    (L)  Flexion:            70*                   120         Extension:        0                      0  Abduction:       NT                    45  Adduction:       NT                    NT  ER:                    20                     40  IR:                     25                    35      Treatment Performed:    Therapeutic Exercise:                          25min   Exercises  - Supine Posterior Pelvic Tilt  - 2 sets - 20 reps - 3-5 hold  - Supine Ankle Pumps  review  - Supine Gluteal Sets   - 2 sets - 20 reps  - Quadruped Rocking Slow  - 2 sets - 20 reps  - Supine Hip Adduction Isometric with Ball  3 sets - 10-20 reps - 3-5 hold  - supine Isometric Hip Abduction with Belt  3 sets - 10-20 reps - 3-5 hold       Manual  Therapy:                                 30 min  PROM L hip flexion, circumduction, log rolling , hip IR/ER  STM adductor group, hip flexors, TFL, glute medius and max    Vaso:                                 10min  Med compression 34 deg    Response to treatment: decreased pain and improved knowledge and understanding of condition. Improved gait pattern after cuing.   Patient was able to complete today's treatment with ease.     Assessment:   Pt had improved PROM compared to previous session. Pt encouraged to get hip extension when she walks and to continue to progress CPM      Plan:  Continue manual therapy techniques and progress HEP      Edward Cornell, PT

## 2024-05-01 ENCOUNTER — TREATMENT (OUTPATIENT)
Dept: PHYSICAL THERAPY | Facility: HOSPITAL | Age: 24
End: 2024-05-01
Payer: COMMERCIAL

## 2024-05-01 DIAGNOSIS — Z47.89 ORTHOPEDIC AFTERCARE: ICD-10-CM

## 2024-05-01 DIAGNOSIS — M25.551 RIGHT HIP PAIN: ICD-10-CM

## 2024-05-01 DIAGNOSIS — M25.651 HIP STIFFNESS, RIGHT: ICD-10-CM

## 2024-05-01 DIAGNOSIS — R29.898 WEAKNESS OF RIGHT HIP: ICD-10-CM

## 2024-05-01 DIAGNOSIS — S73.191A ACETABULAR LABRUM TEAR, RIGHT, INITIAL ENCOUNTER: ICD-10-CM

## 2024-05-01 PROCEDURE — 97110 THERAPEUTIC EXERCISES: CPT | Mod: GP

## 2024-05-01 PROCEDURE — 97140 MANUAL THERAPY 1/> REGIONS: CPT | Mod: GP

## 2024-05-01 ASSESSMENT — PAIN - FUNCTIONAL ASSESSMENT: PAIN_FUNCTIONAL_ASSESSMENT: 0-10

## 2024-05-01 ASSESSMENT — PAIN SCALES - GENERAL: PAINLEVEL_OUTOF10: 5 - MODERATE PAIN

## 2024-05-01 NOTE — PROGRESS NOTES
Physical Therapy  Physical Therapy Treatment Note    Patient Name: Jessica Perez  MRN: [id number]  Today's Date: 5/1/2024  Time Calculation  Start Time: 1155  Stop Time: 1305  Time Calculation (min): 70 min    Insurance:  Visit number: 3 of 49  Authorization info: no auth  Insurance Type: anthem     General:  Reason for visit: Post Op Right Hip Arthroscopy; Rim Trim; Labral reconstruction with allograft; Osteoplasty; Capsular Plication   Referred by: Dr. Errol CHAMBERS    Surgery Date: 4/25/2024    Precautions: No active ER> 20 degrees x 3 weeks, WBAT with crutches, and Avoid hip flexion isometrics x 4 weeks    Current Problem  1. Acetabular labrum tear, right, initial encounter  Follow Up In Physical Therapy      2. Orthopedic aftercare  Follow Up In Physical Therapy      3. Right hip pain  Follow Up In Physical Therapy      4. Hip stiffness, right  Follow Up In Physical Therapy      5. Weakness of right hip  Follow Up In Physical Therapy          Subjective:     Patient she has not tried going on the bike yet while at home. Patient reports she has been using the CPM machine but on for the full 4 hours/day. Patient states she has been increasing the range of her hip circles when she has a family member do them.     Pain  Pain Assessment: 0-10  Pain Score: 5 - Moderate pain  Pain Type: Surgical pain  Pain Location: Hip  0/10 pain at rest. 5/10 pain with movement    Performing HEP?: Yes        Objective:   Hip PROM (Degrees)                             (R)                    (L)  Flexion:            70*                   120         Extension:        0                      0  Abduction:       NT                    45  Adduction:       NT                    NT  ER:                    20                     40  IR:                     25                    35      Treatment Performed:    Therapeutic Exercise:                          50 min  Upright Bike 10'  Hooklying Posterior Pelvic Tilt  - 3x12, 3 sec  hold  Hooklying Bent Knee Fallouts 3x12  Quadruped Rocking Slow 3x12  Supine Hip Adduction Isometric with Ball  3x12, 3 sec hold  Supine Isometric Hip Abduction with Belt  3x12 3 sec hold  Supine SAQ 3x12    Manual Therapy:                                 20 min  PROM L hip flexion, circumduction, log rolling , hip IR/ER  STM adductor group, hip flexors, TFL, glute medius    Assessment:   Patient was introduced to the upright bike during today's session. Patient reporting having a bike at home that she would be able to use. Patient was educated regarding the time spent on the bike and using the CPM machine, per protocol. Patient was also introduced to SAQ and hooklying bent knee fallouts during today's session. Both of these exercises were tolerated appropriately. Patient completed posterior pelvic tilts in a hooklying position during today's session. Continue to progress patient as clinically appropriate.     Plan:  Continue manual therapy techniques and progress MANN [email or web address]-PT

## 2024-05-06 ENCOUNTER — TREATMENT (OUTPATIENT)
Dept: PHYSICAL THERAPY | Facility: HOSPITAL | Age: 24
End: 2024-05-06
Payer: COMMERCIAL

## 2024-05-06 DIAGNOSIS — M25.551 RIGHT HIP PAIN: ICD-10-CM

## 2024-05-06 DIAGNOSIS — Z47.89 ORTHOPEDIC AFTERCARE: ICD-10-CM

## 2024-05-06 DIAGNOSIS — S73.191A ACETABULAR LABRUM TEAR, RIGHT, INITIAL ENCOUNTER: ICD-10-CM

## 2024-05-06 DIAGNOSIS — R29.898 WEAKNESS OF RIGHT HIP: ICD-10-CM

## 2024-05-06 DIAGNOSIS — M25.651 HIP STIFFNESS, RIGHT: ICD-10-CM

## 2024-05-06 PROCEDURE — 97140 MANUAL THERAPY 1/> REGIONS: CPT | Mod: GP | Performed by: PHYSICAL THERAPIST

## 2024-05-06 PROCEDURE — 97110 THERAPEUTIC EXERCISES: CPT | Mod: GP | Performed by: PHYSICAL THERAPIST

## 2024-05-06 ASSESSMENT — PAIN SCALES - GENERAL: PAINLEVEL_OUTOF10: 0 - NO PAIN

## 2024-05-06 ASSESSMENT — PAIN - FUNCTIONAL ASSESSMENT: PAIN_FUNCTIONAL_ASSESSMENT: 0-10

## 2024-05-06 NOTE — PROGRESS NOTES
Physical Therapy  Physical Therapy Treatment Note    Patient Name: Jessica Perez  MRN: [id number]  Today's Date: 5/6/2024        Insurance:  Visit number: 4 of 49  Authorization info: no auth  Insurance Type: anthem     General:  Reason for visit: Post Op Right Hip Arthroscopy; Rim Trim; Labral reconstruction with allograft; Osteoplasty; Capsular Plication   Referred by: Dr. Errol CHAMBERS    Surgery Date: 4/25/2024    Precautions: No active ER> 20 degrees x 3 weeks, WBAT with crutches, and Avoid hip flexion isometrics x 4 weeks    Current Problem  1. Acetabular labrum tear, right, initial encounter  Follow Up In Physical Therapy      2. Orthopedic aftercare  Follow Up In Physical Therapy      3. Right hip pain  Follow Up In Physical Therapy      4. Hip stiffness, right  Follow Up In Physical Therapy      5. Weakness of right hip  Follow Up In Physical Therapy          Subjective:     Pt arrived to therapy reporting she is doing well overall. Pt states she is up to 76 deg on CPM. Pt reports compliance with HEP    Pain  Pain Assessment: 0-10  Pain Score: 0 - No pain      Performing HEP?: Yes        Objective:   Hip PROM (Degrees)                             (R)                    (L)  Flexion:            85                   120         Extension:        0                      0  Abduction:       NT                    45  Adduction:       NT                    NT  ER:                    20                     40  IR:                     25                    35      Treatment Performed:    Therapeutic Exercise:                          25 min  Upright Bike 10'  Hooklying Posterior Pelvic Tilt  - 3x12, 3 sec hold  Hook lying bridge 2x15  Hooklying Bent Knee Fallouts 2x15  Quadruped Rocking Slow 3x12  Supine Hip Adduction Isometric with Ball  3x12, 3 sec hold  Supine Isometric Hip Abduction with Belt  3x12 3 sec hold  Supine SAQ 3x12 NT    Manual Therapy:                                 30 min  PROM L hip flexion,  circumduction, log rolling , hip IR/ER  STM adductor group, hip flexors, TFL, glute medius    Assessment:   Patient PROM was improved compared to previous session and was able to progress to hook lying bridge with no issues. Pt to follow up with surgeon on Weds. Pt continues to have impairments as noted above and would benefit from further therapy to address these impairments and improve overall function    Plan:  Continue manual therapy techniques and progress HEP      Edward Cornell, PT

## 2024-05-08 ENCOUNTER — APPOINTMENT (OUTPATIENT)
Dept: ORTHOPEDIC SURGERY | Facility: HOSPITAL | Age: 24
End: 2024-05-08
Payer: COMMERCIAL

## 2024-05-08 ENCOUNTER — TREATMENT (OUTPATIENT)
Dept: PHYSICAL THERAPY | Facility: HOSPITAL | Age: 24
End: 2024-05-08
Payer: COMMERCIAL

## 2024-05-08 ENCOUNTER — OFFICE VISIT (OUTPATIENT)
Dept: ORTHOPEDIC SURGERY | Facility: HOSPITAL | Age: 24
End: 2024-05-08
Payer: COMMERCIAL

## 2024-05-08 DIAGNOSIS — S73.191A ACETABULAR LABRUM TEAR, RIGHT, INITIAL ENCOUNTER: ICD-10-CM

## 2024-05-08 DIAGNOSIS — S73.191D TEAR OF RIGHT ACETABULAR LABRUM, SUBSEQUENT ENCOUNTER: Primary | ICD-10-CM

## 2024-05-08 DIAGNOSIS — Z47.89 ORTHOPEDIC AFTERCARE: ICD-10-CM

## 2024-05-08 DIAGNOSIS — M25.651 HIP STIFFNESS, RIGHT: ICD-10-CM

## 2024-05-08 DIAGNOSIS — M25.551 RIGHT HIP PAIN: ICD-10-CM

## 2024-05-08 DIAGNOSIS — R29.898 WEAKNESS OF RIGHT HIP: ICD-10-CM

## 2024-05-08 PROCEDURE — 97116 GAIT TRAINING THERAPY: CPT | Mod: GP | Performed by: PHYSICAL THERAPIST

## 2024-05-08 PROCEDURE — 97140 MANUAL THERAPY 1/> REGIONS: CPT | Mod: GP | Performed by: PHYSICAL THERAPIST

## 2024-05-08 PROCEDURE — 97110 THERAPEUTIC EXERCISES: CPT | Mod: GP | Performed by: PHYSICAL THERAPIST

## 2024-05-08 PROCEDURE — 99024 POSTOP FOLLOW-UP VISIT: CPT | Performed by: PHYSICIAN ASSISTANT

## 2024-05-08 ASSESSMENT — PAIN SCALES - GENERAL: PAINLEVEL_OUTOF10: 0 - NO PAIN

## 2024-05-08 ASSESSMENT — PAIN - FUNCTIONAL ASSESSMENT: PAIN_FUNCTIONAL_ASSESSMENT: 0-10

## 2024-05-08 NOTE — PROGRESS NOTES
Right hip DOS 4/25/24      HIP POST OP  The patient returns status post Right hip arthroscopy on 4/25/24.  They are doing well.  They have no evidence of lower extremity DVT.  Incisions are clean and dry.  Healing well.  Their pain is appropriate.  Range of motion is within normal limits.    Continue therapy per protocol at [facility].  I will plan to see them back 6 weeks from surgery.      Yisel Lo PA-C

## 2024-05-08 NOTE — PROGRESS NOTES
Physical Therapy  Physical Therapy Treatment Note    Patient Name: Jessica Perez  MRN: [id number]  Today's Date: 5/8/2024   Time Calculation  Start Time: 1010  Stop Time: 1120  Time Calculation (min): 70 min    Insurance:  Visit number: 5 of 49  Authorization info: no auth  Insurance Type: anthem     General:  Reason for visit: Post Op Right Hip Arthroscopy; Rim Trim; Labral reconstruction with allograft; Osteoplasty; Capsular Plication   Referred by: Dr. Errol CHAMBERS    Surgery Date: 4/25/2024    Precautions: No active ER> 20 degrees x 3 weeks, WBAT with crutches, and Avoid hip flexion isometrics x 4 weeks    Current Problem  1. Acetabular labrum tear, right, initial encounter  Follow Up In Physical Therapy      2. Orthopedic aftercare  Follow Up In Physical Therapy      3. Right hip pain  Follow Up In Physical Therapy      4. Hip stiffness, right  Follow Up In Physical Therapy      5. Weakness of right hip  Follow Up In Physical Therapy          Subjective:     Pt arrived to therapy reporting she is doing well. Patient states she has no pain at rest or with home exercises. Patient states her CPM machine is currently set to 96 degrees.     Pain  Pain Assessment: 0-10  Pain Score: 0 - No pain      Performing HEP?: Yes        Objective:   Hip PROM (Degrees)                             (R)                    (L)  Flexion:            85                   120         Extension:        0                      0  Abduction:       NT                    45  Adduction:       NT                    NT  ER:                    20                     40  IR:                     25                    35      Treatment Performed:    Therapeutic Exercise:                          25 min  Low Plank on Elbows 3x20 sec  Modified Side Plank on Elbows, Knees Bent 3x20 each  Green Physioball HSC 3x12   Hook lying bridge 3x12  Hooklying Bent Knee Fallouts 3x12    Gait Training:     15 min  SL Treadmill Gait Training 6' 0.7MPH  Education  regarding easing the discharge of crutches  Education regarding using crutches in non-controlled environments and during periods of fatigue  Review of stair navigation without the use of crutches    Manual Therapy:                                 30 min  PROM L hip flexion, circumduction, log rolling, hip IR/ER  STM adductor group, hip flexors, TFL, glute medius    Assessment:   Patient continues to progress well through her POC. Patient was introduced to planks this date where she was able to perform low planks with good core stability and occasional vc regarding activation of the glutes. During side planks, the patient's steri-strips had come loose. Very mild bleeding observed. New steri-strips were applied and a band-aid was placed over the new steri-strips. Patient given education regarding signs and symptoms of infection. Side planks, low planks and physioball hamstring curls given as HEP. Continue to progress patient as clinically appropriate according to protocol.     Plan:  Continue manual therapy techniques and progress HEP      PITO CORBINPT

## 2024-05-14 ENCOUNTER — TREATMENT (OUTPATIENT)
Dept: PHYSICAL THERAPY | Facility: HOSPITAL | Age: 24
End: 2024-05-14
Payer: COMMERCIAL

## 2024-05-14 DIAGNOSIS — S73.191A ACETABULAR LABRUM TEAR, RIGHT, INITIAL ENCOUNTER: ICD-10-CM

## 2024-05-14 DIAGNOSIS — M25.651 HIP STIFFNESS, RIGHT: ICD-10-CM

## 2024-05-14 DIAGNOSIS — M25.551 RIGHT HIP PAIN: ICD-10-CM

## 2024-05-14 DIAGNOSIS — Z47.89 ORTHOPEDIC AFTERCARE: ICD-10-CM

## 2024-05-14 DIAGNOSIS — R29.898 WEAKNESS OF RIGHT HIP: ICD-10-CM

## 2024-05-14 PROCEDURE — 97140 MANUAL THERAPY 1/> REGIONS: CPT | Mod: GP | Performed by: PHYSICAL THERAPIST

## 2024-05-14 PROCEDURE — 97110 THERAPEUTIC EXERCISES: CPT | Mod: GP | Performed by: PHYSICAL THERAPIST

## 2024-05-14 ASSESSMENT — PAIN - FUNCTIONAL ASSESSMENT: PAIN_FUNCTIONAL_ASSESSMENT: 0-10

## 2024-05-14 ASSESSMENT — PAIN SCALES - GENERAL: PAINLEVEL_OUTOF10: 0 - NO PAIN

## 2024-05-14 NOTE — PROGRESS NOTES
Physical Therapy  Physical Therapy Treatment Note    Patient Name: Jessica Perez  MRN: [id number]  Today's Date: 5/14/2024   Time Calculation  Start Time: 1300  Stop Time: 1400  Time Calculation (min): 60 min    Insurance:  Visit number: 6 of 49  Authorization info: no auth  Insurance Type: anthem     General:  Reason for visit: Post Op Right Hip Arthroscopy; Rim Trim; Labral reconstruction with allograft; Osteoplasty; Capsular Plication   Referred by: Dr. Errol CHAMBERS    Surgery Date: 4/25/2024    Precautions: No active ER> 20 degrees x 3 weeks, WBAT with crutches, and Avoid hip flexion isometrics x 4 weeks    Current Problem  1. Acetabular labrum tear, right, initial encounter  Follow Up In Physical Therapy      2. Orthopedic aftercare  Follow Up In Physical Therapy      3. Right hip pain  Follow Up In Physical Therapy      4. Hip stiffness, right  Follow Up In Physical Therapy      5. Weakness of right hip  Follow Up In Physical Therapy          Subjective:     Pt arrived therapy reporting she is doing well overall. Pt reports CPM is at 120 deg. Pt only uses crutches when she is sore. Pt reports compliance with HEP    Pain  Pain Assessment: 0-10  Pain Score: 0 - No pain      Performing HEP?: Yes        Objective:   Hip PROM (Degrees)                             (R)                    (L)  Flexion:            110                  120         Extension:        0                      0  Abduction:       NT                    45  Adduction:       NT                    NT  ER:                    20                     40  IR:                     30                   35      Treatment Performed:    Therapeutic Exercise:                          30 min  Low Plank on Elbows 3x20 sec  Modified Side Plank on Elbows, Knees Bent 3x20 each NT  Green Physioball HSC 3x12   Hook lying bridge 3x12  Hooklying Bent Knee Fallouts 3x12  Hip to heel rocking 2x20  Prone lying x4'  Upright bike x5min    Gait Training:     NT  SL  Treadmill Gait Training 6' 0.7MPH  Education regarding easing the discharge of crutches  Education regarding using crutches in non-controlled environments and during periods of fatigue  Review of stair navigation without the use of crutches    Manual Therapy:                                 30 min  PROM L hip flexion, circumduction, log rolling, hip IR/ER  STM adductor group, hip flexors, TFL, glute medius    Assessment:   Patient continues to progress well through her POC. Pt demonstrates improve hip ROM. Pt is mostly walking without crutches but when she is sore she uses them.     Plan:  Continue manual therapy techniques and progress MAGNUS Cornell, PT

## 2024-05-15 ENCOUNTER — APPOINTMENT (OUTPATIENT)
Dept: PHYSICAL THERAPY | Facility: HOSPITAL | Age: 24
End: 2024-05-15
Payer: COMMERCIAL

## 2024-05-16 ENCOUNTER — TREATMENT (OUTPATIENT)
Dept: PHYSICAL THERAPY | Facility: HOSPITAL | Age: 24
End: 2024-05-16
Payer: COMMERCIAL

## 2024-05-16 DIAGNOSIS — M25.551 RIGHT HIP PAIN: ICD-10-CM

## 2024-05-16 DIAGNOSIS — S73.191A ACETABULAR LABRUM TEAR, RIGHT, INITIAL ENCOUNTER: ICD-10-CM

## 2024-05-16 DIAGNOSIS — R29.898 WEAKNESS OF RIGHT HIP: ICD-10-CM

## 2024-05-16 DIAGNOSIS — M25.651 HIP STIFFNESS, RIGHT: ICD-10-CM

## 2024-05-16 DIAGNOSIS — Z47.89 ORTHOPEDIC AFTERCARE: ICD-10-CM

## 2024-05-16 PROCEDURE — 97140 MANUAL THERAPY 1/> REGIONS: CPT | Mod: GP | Performed by: PHYSICAL THERAPIST

## 2024-05-16 PROCEDURE — 97110 THERAPEUTIC EXERCISES: CPT | Mod: GP | Performed by: PHYSICAL THERAPIST

## 2024-05-16 ASSESSMENT — PAIN - FUNCTIONAL ASSESSMENT: PAIN_FUNCTIONAL_ASSESSMENT: 0-10

## 2024-05-16 ASSESSMENT — PAIN SCALES - GENERAL: PAINLEVEL_OUTOF10: 0 - NO PAIN

## 2024-05-16 NOTE — PROGRESS NOTES
"  Physical Therapy  Physical Therapy Treatment Note    Patient Name: Jessica Perez  MRN: [id number]  Today's Date: 5/16/2024   Time Calculation  Start Time: 0805  Stop Time: 0915  Time Calculation (min): 70 min    Insurance:  Visit number: 8 of 49  Authorization info: no auth  Insurance Type: anthem     General:  Reason for visit: Post Op Right Hip Arthroscopy; Rim Trim; Labral reconstruction with allograft; Osteoplasty; Capsular Plication   Referred by: Dr. Errol CHAMBERS    Surgery Date: 4/25/2024    Precautions: No active ER> 20 degrees x 3 weeks, WBAT with crutches, and Avoid hip flexion isometrics x 4 weeks    Current Problem  1. Acetabular labrum tear, right, initial encounter  Follow Up In Physical Therapy      2. Orthopedic aftercare  Follow Up In Physical Therapy      3. Right hip pain  Follow Up In Physical Therapy      4. Hip stiffness, right  Follow Up In Physical Therapy      5. Weakness of right hip  Follow Up In Physical Therapy          Subjective:     Pt arrived to therapy reporting she is feeling a little sore and stiff today. Pt did not use crutches yesterday. Pt reports she is going back to work next week for 4 hrs    Pain  Pain Assessment: 0-10  Pain Score: 0 - No pain      Performing HEP?: Yes        Objective:   Hip PROM (Degrees)                             (R)                    (L)  Flexion:            110                  120         Extension:        0                      0  Abduction:       NT                    45  Adduction:       NT                    NT  ER:                    20                     40  IR:                     30                   35      Treatment Performed:    Therapeutic Exercise:                          40 min  Low Plank on Elbows 5x10\" for 5 rounds  Modified Side Plank on Elbows, Knees Bent 3x20 each NT  Green Physioball HSC 3x12   Hook lying bridge 3x12 with ball squeeze  Hooklying Bent Knee Fallouts 3x12  Hip to heel rocking 2x20  Prone lying x4'  Upright bike " x5min  Resisted side stepping OTBL 5 steps right/5 steps left    Gait Training:     NT  SL Treadmill Gait Training 6' 0.7MPH  Education regarding easing the discharge of crutches  Education regarding using crutches in non-controlled environments and during periods of fatigue  Review of stair navigation without the use of crutches    Manual Therapy:                                 30 min  PROM L hip flexion, circumduction, log rolling, hip IR/ER  STM adductor group, hip flexors, TFL, glute medius    Assessment:   Pt ROM continues to improve. Hip flexors were a little irritated. I believe this mostly do to walking more. We discussed proper gait and to not over do it. We will progress to more CKC exercises next visit    Plan:  Continue manual therapy techniques and progress HEP      Edward Cornell, PT

## 2024-05-22 ENCOUNTER — TREATMENT (OUTPATIENT)
Dept: PHYSICAL THERAPY | Facility: HOSPITAL | Age: 24
End: 2024-05-22
Payer: COMMERCIAL

## 2024-05-22 DIAGNOSIS — M25.851 FEMOROACETABULAR IMPINGEMENT OF RIGHT HIP: ICD-10-CM

## 2024-05-22 DIAGNOSIS — Z47.89 ORTHOPEDIC AFTERCARE: ICD-10-CM

## 2024-05-22 DIAGNOSIS — S73.191A ACETABULAR LABRUM TEAR, RIGHT, INITIAL ENCOUNTER: Primary | ICD-10-CM

## 2024-05-22 DIAGNOSIS — M25.651 HIP STIFFNESS, RIGHT: ICD-10-CM

## 2024-05-22 DIAGNOSIS — R29.898 WEAKNESS OF RIGHT HIP: ICD-10-CM

## 2024-05-22 DIAGNOSIS — M25.551 RIGHT HIP PAIN: ICD-10-CM

## 2024-05-22 PROCEDURE — 97140 MANUAL THERAPY 1/> REGIONS: CPT | Mod: GP | Performed by: PHYSICAL THERAPIST

## 2024-05-22 PROCEDURE — 97110 THERAPEUTIC EXERCISES: CPT | Mod: GP | Performed by: PHYSICAL THERAPIST

## 2024-05-22 ASSESSMENT — PAIN - FUNCTIONAL ASSESSMENT: PAIN_FUNCTIONAL_ASSESSMENT: 0-10

## 2024-05-22 ASSESSMENT — PAIN SCALES - GENERAL: PAINLEVEL_OUTOF10: 1

## 2024-05-22 NOTE — PROGRESS NOTES
"  Physical Therapy  Physical Therapy Treatment Note    Patient Name: Jessica Perez  MRN: [id number]  Today's Date: 5/22/2024        Insurance:  Visit number: 9 of 49  Authorization info: no auth  Insurance Type: anthem     General:  Reason for visit: Post Op Right Hip Arthroscopy; Rim Trim; Labral reconstruction with allograft; Osteoplasty; Capsular Plication   Referred by: Dr. Errol CHAMBERS    Surgery Date: 4/25/2024    Precautions: No active ER> 20 degrees x 3 weeks, WBAT with crutches, and Avoid hip flexion isometrics x 4 weeks    Current Problem  1. Acetabular labrum tear, right, initial encounter  Follow Up In Physical Therapy      2. Orthopedic aftercare  Follow Up In Physical Therapy      3. Right hip pain  Follow Up In Physical Therapy      4. Hip stiffness, right  Follow Up In Physical Therapy      5. Weakness of right hip  Follow Up In Physical Therapy      6. Femoroacetabular impingement of right hip              Subjective:     Pt arrived to therapy reporting she is feeling a little sore today. Pt states she has return to work this week 4hrs/day. Pt reports compliance with HEP    Pain  Pain Assessment: 0-10  Pain Score: 1      Performing HEP?: Yes        Objective:   Hip PROM (Degrees)                             (R)                    (L)  Flexion:            110                  120         Extension:        0                      0  Abduction:       NT                    45  Adduction:       NT                    NT  ER:                    20                     40  IR:                     30                   35      Treatment Performed:    Therapeutic Exercise:                          30min  Low Plank on Elbows 5x10\" for 5 rounds review  Modified Side Plank on Elbows, Knees Bent 3x20 each NT  Green Physioball HSC 3x12   Hook lying bridge 3x12 with ball squeeze  Hooklying Bent Knee Fallouts 3x12 NT  Hip to heel rocking 2x20 discussed  Resisted side stepping RTB 5 steps right/5 steps left  BW squats " 3x12  Total gym leg press 1 cord 3x12    Vaso:     10  Vaso min compress 34 deg not billed    Manual Therapy:                                 30 min  PROM L hip flexion, circumduction, log rolling, hip IR/ER  STM adductor group, hip flexors, TFL, glute medius    Access Code [email or web address]    Assessment:   Pt continues to progress well with ROM. Pt tolerated increase in strengthen exercises by denying pain throughout. Pt would continue to benefit from further therapy to reach all established goals    Plan:  Continue manual therapy techniques and progress MAGNUS Cornell, PT

## 2024-05-24 ENCOUNTER — TREATMENT (OUTPATIENT)
Dept: PHYSICAL THERAPY | Facility: HOSPITAL | Age: 24
End: 2024-05-24
Payer: COMMERCIAL

## 2024-05-24 DIAGNOSIS — R29.898 WEAKNESS OF RIGHT HIP: ICD-10-CM

## 2024-05-24 DIAGNOSIS — S73.191A ACETABULAR LABRUM TEAR, RIGHT, INITIAL ENCOUNTER: ICD-10-CM

## 2024-05-24 DIAGNOSIS — M25.551 RIGHT HIP PAIN: ICD-10-CM

## 2024-05-24 DIAGNOSIS — M25.651 HIP STIFFNESS, RIGHT: ICD-10-CM

## 2024-05-24 DIAGNOSIS — Z47.89 ORTHOPEDIC AFTERCARE: ICD-10-CM

## 2024-05-24 PROCEDURE — 97140 MANUAL THERAPY 1/> REGIONS: CPT | Mod: GP | Performed by: PHYSICAL THERAPIST

## 2024-05-24 PROCEDURE — 97110 THERAPEUTIC EXERCISES: CPT | Mod: GP | Performed by: PHYSICAL THERAPIST

## 2024-05-24 ASSESSMENT — PAIN - FUNCTIONAL ASSESSMENT: PAIN_FUNCTIONAL_ASSESSMENT: 0-10

## 2024-05-24 ASSESSMENT — PAIN SCALES - GENERAL: PAINLEVEL_OUTOF10: 0 - NO PAIN

## 2024-05-24 NOTE — PROGRESS NOTES
"  Physical Therapy  Physical Therapy Treatment Note    Patient Name: Jessica Perez  MRN: [id number]  Today's Date: 5/24/2024   Time Calculation  Start Time: 1215  Stop Time: 1310  Time Calculation (min): 55 min    Insurance:  Visit number: 10 of 49  Authorization info: no auth  Insurance Type: anthem     General:  Reason for visit: Post Op Right Hip Arthroscopy; Rim Trim; Labral reconstruction with allograft; Osteoplasty; Capsular Plication   Referred by: Dr. Errol CHAMBERS    Surgery Date: 4/25/2024    Precautions: No active ER> 20 degrees x 3 weeks, WBAT with crutches, and Avoid hip flexion isometrics x 4 weeks    Current Problem  1. Acetabular labrum tear, right, initial encounter  Follow Up In Physical Therapy      2. Orthopedic aftercare  Follow Up In Physical Therapy      3. Right hip pain  Follow Up In Physical Therapy      4. Hip stiffness, right  Follow Up In Physical Therapy      5. Weakness of right hip  Follow Up In Physical Therapy            Subjective:     Pt arrived to therapy reporting she is doing well. No new complaints    Pain  Pain Assessment: 0-10  Pain Score: 0 - No pain      Performing HEP?: Yes        Objective:   Hip PROM (Degrees)                             (R)                    (L)  Flexion:            110                  120         Extension:        0                      0  Abduction:       NT                    45  Adduction:       NT                    NT  ER:                    20                     40  IR:                     30                   35      Treatment Performed:    Therapeutic Exercise:                          30min  Low Plank on Elbows 5x10\" for 5 rounds review  Modified Side Plank on Elbows, Knees Bent 3x20 each NT  Green Physioball HSC 3x12   Single leg bridge 3x10  Hooklying Bent Knee Fallouts 3x12 with orange TB  Hip to heel rocking 2x20 discussed  Resisted side stepping RTB 5 steps right/5 steps left  Goblet squats 3x10 blue KB  Total gym single leg press 2 " cord 4x8  Upright bike x10'    Vaso:     1NT  Vaso min compress 34 deg not billed    Manual Therapy:                                 25 min  PROM L hip flexion, circumduction, log rolling, hip IR/ER  STM adductor group, hip flexors, TFL, glute medius    Access Code [email or web address]    Assessment:   Pt continues progress well with ROM. Pt demonstrated improved strength by increasing weight. Pt would continue to benefit from further therapy to reach all established goals    Plan:  Continue manual therapy techniques and progress MAGNUS Cornell, PT

## 2024-05-28 ENCOUNTER — TREATMENT (OUTPATIENT)
Dept: PHYSICAL THERAPY | Facility: HOSPITAL | Age: 24
End: 2024-05-28
Payer: COMMERCIAL

## 2024-05-28 DIAGNOSIS — M25.651 HIP STIFFNESS, RIGHT: ICD-10-CM

## 2024-05-28 DIAGNOSIS — S73.191A ACETABULAR LABRUM TEAR, RIGHT, INITIAL ENCOUNTER: ICD-10-CM

## 2024-05-28 DIAGNOSIS — R29.898 WEAKNESS OF RIGHT HIP: ICD-10-CM

## 2024-05-28 DIAGNOSIS — M25.551 RIGHT HIP PAIN: ICD-10-CM

## 2024-05-28 DIAGNOSIS — Z47.89 ORTHOPEDIC AFTERCARE: ICD-10-CM

## 2024-05-28 PROCEDURE — 97110 THERAPEUTIC EXERCISES: CPT | Mod: GP | Performed by: PHYSICAL THERAPIST

## 2024-05-28 PROCEDURE — 97140 MANUAL THERAPY 1/> REGIONS: CPT | Mod: GP | Performed by: PHYSICAL THERAPIST

## 2024-05-28 NOTE — PROGRESS NOTES
"  Physical Therapy  Physical Therapy Treatment Note    Patient Name: Jessica Perez  MRN: [id number]  Today's Date:5/28/2024  Time Calculation  Start Time: 1330  Stop Time: 1435  Time Calculation (min): 65 min    Insurance:  Visit number: 11 of 49  Authorization info: no auth  Insurance Type: anthem     General:  Reason for visit: Post Op Right Hip Arthroscopy; Rim Trim; Labral reconstruction with allograft; Osteoplasty; Capsular Plication   Referred by: Dr. Errol CHAMBERS    Surgery Date: 4/25/2024 (4 week+5 days)    Precautions: No active ER> 20 degrees x 3 weeks, WBAT with crutches, and Avoid hip flexion isometrics x 4 weeks    Current Problem  1. Acetabular labrum tear, right, initial encounter  Follow Up In Physical Therapy      2. Orthopedic aftercare  Follow Up In Physical Therapy      3. Right hip pain  Follow Up In Physical Therapy      4. Hip stiffness, right  Follow Up In Physical Therapy      5. Weakness of right hip  Follow Up In Physical Therapy            Subjective:     Pt arrived to therapy reporting she is noticing some increase in anterior and lateral hip soreness. Pt states more sore vs pain. Pt reports complaince with HEP    Pain         Performing HEP?: Yes        Objective:   Hip PROM (Degrees)                             (R)                    (L)  Flexion:            110                  120         Extension:        0                      0  Abduction:       NT                    45  Adduction:       NT                    NT  ER:                    20                     40  IR:                     30                   35      Treatment Performed:    Therapeutic Exercise:                          30min  Low Plank on Elbows 5x10\" for 5 rounds review  Modified Side Plank on Elbows, Knees Bent 3x20 each NT  Green Physioball HSC 3x12 NT  Single leg bridge 3x10  Hooklying Bent Knee Fallouts 3x12 with orange TB NT  Hip to heel rocking 2x20 discussed NT  Resisted side stepping RTB 5 steps right/5 " steps left  Goblet squats 3x10 blue KB  Total gym single leg press 2 cord 4x8  Upright bike x10'  Single leg split squat iso hold  Review of backing exercises down with she has anterior irritation     Vaso:     10min  Vaso min compress 34 deg not billed    Manual Therapy:                                 25 min  PROM L hip flexion, circumduction, log rolling, hip IR/ER  STM adductor group, hip flexors, TFL, glute medius, rotators of the hip  STM to incisions     Access Code [email or web address]    Assessment:   Pt had increase soreness in hip flexors today primarily rectus femoris and TFL. This not abnormal but we want to keep an eye on it. I believe this is secondary to increase walking. We will continue to focus on posterior chain activation and progress ROM as tolerated    Plan:  Continue manual therapy techniques and progress MAGNUS Cornell, PT

## 2024-05-30 ENCOUNTER — TREATMENT (OUTPATIENT)
Dept: PHYSICAL THERAPY | Facility: HOSPITAL | Age: 24
End: 2024-05-30
Payer: COMMERCIAL

## 2024-05-30 DIAGNOSIS — M25.651 HIP STIFFNESS, RIGHT: ICD-10-CM

## 2024-05-30 DIAGNOSIS — Z47.89 ORTHOPEDIC AFTERCARE: ICD-10-CM

## 2024-05-30 DIAGNOSIS — M25.551 RIGHT HIP PAIN: ICD-10-CM

## 2024-05-30 DIAGNOSIS — S73.191A ACETABULAR LABRUM TEAR, RIGHT, INITIAL ENCOUNTER: ICD-10-CM

## 2024-05-30 DIAGNOSIS — R29.898 WEAKNESS OF RIGHT HIP: ICD-10-CM

## 2024-05-30 PROCEDURE — 97110 THERAPEUTIC EXERCISES: CPT | Mod: GP | Performed by: PHYSICAL THERAPIST

## 2024-05-30 PROCEDURE — 97140 MANUAL THERAPY 1/> REGIONS: CPT | Mod: GP | Performed by: PHYSICAL THERAPIST

## 2024-05-30 ASSESSMENT — PAIN SCALES - GENERAL: PAINLEVEL_OUTOF10: 0 - NO PAIN

## 2024-05-30 ASSESSMENT — PAIN - FUNCTIONAL ASSESSMENT: PAIN_FUNCTIONAL_ASSESSMENT: 0-10

## 2024-05-30 NOTE — PROGRESS NOTES
Physical Therapy  Physical Therapy Treatment Note    Patient Name: Jessica Perez  MRN: [id number]  Today's Date: 5/30/2024  Time Calculation  Start Time: 1330  Stop Time: 1435  Time Calculation (min): 65 min    Insurance:  Visit number: 12 of 49  Authorization info: no auth  Insurance Type: anthem     General:  Reason for visit: Post Op Right Hip Arthroscopy; Rim Trim; Labral reconstruction with allograft; Osteoplasty; Capsular Plication   Referred by: Dr. Errol CHAMBERS    Surgery Date: 4/25/2024 (5 weeks)    Precautions: No active ER> 20 degrees x 3 weeks, WBAT with crutches, and Avoid hip flexion isometrics x 4 weeks    Current Problem  1. Acetabular labrum tear, right, initial encounter  Follow Up In Physical Therapy      2. Orthopedic aftercare  Follow Up In Physical Therapy      3. Right hip pain  Follow Up In Physical Therapy      4. Hip stiffness, right  Follow Up In Physical Therapy      5. Weakness of right hip  Follow Up In Physical Therapy            Subjective:     Pt arrived to therapy reporting she is noticing some increase in anterior and lateral hip soreness. Pt states more sore vs pain. Pt reports complaince with HEP    Pain  Pain Assessment: 0-10  Pain Score: 0 - No pain      Performing HEP?: Yes        Objective:   Hip PROM (Degrees)                             (R)                    (L)  Flexion:            110                  120         Extension:        0                      0  Abduction:       NT                    45  Adduction:       NT                    NT  ER:                    20                     40  IR:                     30                   35      Treatment Performed:    Therapeutic Exercise:                          30min  Hook lying bridge 1x12 bilaterally 2x12 single   Side plank with clamshells 3x12 each  Goblet squat blue KB 3x10  Split squat with contralateral hip abduction OTB 3x12  Resisted side stepping with sports cord    Vaso:     10min  Vaso min compress 34 deg  not billed    Manual Therapy:                                 25 min  PROM L hip flexion, circumduction, log rolling, hip IR/ER  STM adductor group, hip flexors, TFL, glute medius, rotators of the hip  STM to incisions   Inferior and lateral hip mobs with with belt assist grades 3-4    Access Code [email or web address]    Assessment:   Pt had best session to date. Pt ROM continues to progress well and less anterior hip soreness. Pt tolerated session well by denying increase in pain throughout    Plan:  Continue manual therapy techniques and progress HEP      Edward Cornell, PT

## 2024-06-03 ENCOUNTER — TREATMENT (OUTPATIENT)
Dept: PHYSICAL THERAPY | Facility: HOSPITAL | Age: 24
End: 2024-06-03
Payer: COMMERCIAL

## 2024-06-03 DIAGNOSIS — M25.651 HIP STIFFNESS, RIGHT: ICD-10-CM

## 2024-06-03 DIAGNOSIS — S73.191A ACETABULAR LABRUM TEAR, RIGHT, INITIAL ENCOUNTER: ICD-10-CM

## 2024-06-03 DIAGNOSIS — Z47.89 ORTHOPEDIC AFTERCARE: ICD-10-CM

## 2024-06-03 DIAGNOSIS — M25.551 RIGHT HIP PAIN: ICD-10-CM

## 2024-06-03 DIAGNOSIS — R29.898 WEAKNESS OF RIGHT HIP: ICD-10-CM

## 2024-06-03 PROCEDURE — 97110 THERAPEUTIC EXERCISES: CPT | Mod: GP | Performed by: PHYSICAL THERAPIST

## 2024-06-03 PROCEDURE — 97140 MANUAL THERAPY 1/> REGIONS: CPT | Mod: GP | Performed by: PHYSICAL THERAPIST

## 2024-06-03 ASSESSMENT — PAIN - FUNCTIONAL ASSESSMENT: PAIN_FUNCTIONAL_ASSESSMENT: 0-10

## 2024-06-03 ASSESSMENT — PAIN SCALES - GENERAL: PAINLEVEL_OUTOF10: 0 - NO PAIN

## 2024-06-03 NOTE — PROGRESS NOTES
Physical Therapy  Physical Therapy Treatment Note    Patient Name: Jessica Perez  MRN: [id number]  Today's Date: 6/3/2024   Time Calculation  Start Time: 1627  Stop Time: 1722  Time Calculation (min): 55 min    Insurance:  Visit number: 12 of 49  Authorization info: no auth  Insurance Type: anthem     General:  Reason for visit: Post Op Right Hip Arthroscopy; Rim Trim; Labral reconstruction with allograft; Osteoplasty; Capsular Plication   Referred by: Dr. Errol CHAMBERS    Surgery Date: 4/25/2024 (5 weeks)    Precautions: No active ER> 20 degrees x 3 weeks, WBAT with crutches, and Avoid hip flexion isometrics x 4 weeks    Current Problem  1. Acetabular labrum tear, right, initial encounter  Follow Up In Physical Therapy      2. Orthopedic aftercare  Follow Up In Physical Therapy      3. Right hip pain  Follow Up In Physical Therapy      4. Hip stiffness, right  Follow Up In Physical Therapy      5. Weakness of right hip  Follow Up In Physical Therapy            Subjective:   Pt arrived to therapy reporting she is feeling good today. Pt had firs fulll day back at work with no issues    Pain  Pain Assessment: 0-10  Pain Score: 0 - No pain      Performing HEP?: Yes        Objective:   Hip PROM (Degrees)                             (R)                    (L)  Flexion:            110                  120         Extension:        0                      0  Abduction:       45                    45  Adduction:       NT                    NT  ER:                    35                     40  IR:                     35                   35      Treatment Performed:    Therapeutic Exercise:                          30min  Hook lying bridge 2x15  Side plank with clamshells 3x12 each  Goblet squat blue KB 4x8  Band 3 way toe taps red TB loop x5 each  Seated bilateral hip IR OT loop 3x15  Single leg press 4x8 2 cords  Single leg ball toss 7# 3x20      Manual Therapy:                                 25 min  PROM L hip flexion,  circumduction, log rolling, hip IR/ER  STM adductor group, hip flexors, TFL, glute medius, rotators of the hip  STM to incisions   Inferior and lateral hip mobs with with belt assist grades 3-4      Access Code [email or web address]    Vaso:     NT  Vaso min compress 34 deg not billed  Assessment:   Pt continues to show improvements in ROM and strength by demonstrating increased resistance. Pt would continue to benefit from further therapy to reach all established goals    Plan:  Continue manual therapy progress strength exercises and progress functional activities      Edward Cornell, PT

## 2024-06-05 ENCOUNTER — TREATMENT (OUTPATIENT)
Dept: PHYSICAL THERAPY | Facility: HOSPITAL | Age: 24
End: 2024-06-05
Payer: COMMERCIAL

## 2024-06-05 DIAGNOSIS — M25.551 RIGHT HIP PAIN: ICD-10-CM

## 2024-06-05 DIAGNOSIS — R29.898 WEAKNESS OF RIGHT HIP: ICD-10-CM

## 2024-06-05 DIAGNOSIS — S73.191A ACETABULAR LABRUM TEAR, RIGHT, INITIAL ENCOUNTER: ICD-10-CM

## 2024-06-05 DIAGNOSIS — M25.651 HIP STIFFNESS, RIGHT: ICD-10-CM

## 2024-06-05 DIAGNOSIS — Z47.89 ORTHOPEDIC AFTERCARE: ICD-10-CM

## 2024-06-05 PROCEDURE — 97140 MANUAL THERAPY 1/> REGIONS: CPT | Mod: GP | Performed by: PHYSICAL THERAPIST

## 2024-06-05 PROCEDURE — 97110 THERAPEUTIC EXERCISES: CPT | Mod: GP | Performed by: PHYSICAL THERAPIST

## 2024-06-05 ASSESSMENT — PAIN SCALES - GENERAL: PAINLEVEL_OUTOF10: 0 - NO PAIN

## 2024-06-05 ASSESSMENT — PAIN - FUNCTIONAL ASSESSMENT: PAIN_FUNCTIONAL_ASSESSMENT: 0-10

## 2024-06-05 NOTE — PROGRESS NOTES
Physical Therapy  Physical Therapy Treatment Note    Patient Name: Jessica Perez  MRN: [id number]  Today's Date: 6/6/2024   Time Calculation  Start Time: 1635  Stop Time: 1730  Time Calculation (min): 55 min    Insurance:  Visit number: 13 of 49  Authorization info: no auth  Insurance Type: anthem     General:  Reason for visit: Post Op Right Hip Arthroscopy; Rim Trim; Labral reconstruction with allograft; Osteoplasty; Capsular Plication   Referred by: Dr. Errol CHAMBERS    Surgery Date: 4/25/2024     Precautions: No active ER> 20 degrees x 3 weeks, WBAT with crutches, and Avoid hip flexion isometrics x 4 weeks    Current Problem  1. Acetabular labrum tear, right, initial encounter  Follow Up In Physical Therapy      2. Orthopedic aftercare  Follow Up In Physical Therapy      3. Right hip pain  Follow Up In Physical Therapy      4. Hip stiffness, right  Follow Up In Physical Therapy      5. Weakness of right hip  Follow Up In Physical Therapy            Subjective:   Pt arrived to therapy reporting she is feeling really well. Pt was able to participate in scavenger hunt at work with no issues    Pain  Pain Assessment: 0-10  Pain Score: 0 - No pain      Performing HEP?: Yes        Objective:   Hip PROM (Degrees)                             (R)                    (L)  Flexion:            110                  120         Extension:        0                      0  Abduction:       45                    45  Adduction:       NT                    NT  ER:                    35                     40  IR:                     35                   35      Treatment Performed:    Therapeutic Exercise:                          30min  Upright bike x10'  Side plank with clamshells 3x12 each  Single leg bridge with red TB loop 3x12  Band 3 way toe taps red TB loop x5 each  Prone hip ER/IR  Single leg squat from plinth 3x10  Single leg ball toss 7# 3x20      Manual Therapy:                                 25 min  PROM L hip  flexion, circumduction, log rolling, hip IR/ER  STM adductor group, hip flexors, TFL, glute medius, rotators of the hip  STM to incisions   Inferior and lateral hip mobs with with belt assist grades 3-4      Access Code [email or web address]    Vaso:     NT  Vaso min compress 34 deg not billed  Assessment:   Pt demonstrated improved strength by being able to perform single leg squat from elevated plinth. Pt required VC for control. Pt would benefit from further therapy to reach all established goals    Plan:  Continue manual therapy progress strength exercises and progress functional activities      Edward Cornell, PT

## 2024-06-12 ENCOUNTER — APPOINTMENT (OUTPATIENT)
Dept: ORTHOPEDIC SURGERY | Facility: HOSPITAL | Age: 24
End: 2024-06-12
Payer: COMMERCIAL

## 2024-06-14 ENCOUNTER — TREATMENT (OUTPATIENT)
Dept: PHYSICAL THERAPY | Facility: HOSPITAL | Age: 24
End: 2024-06-14
Payer: COMMERCIAL

## 2024-06-14 DIAGNOSIS — M25.551 RIGHT HIP PAIN: ICD-10-CM

## 2024-06-14 DIAGNOSIS — R29.898 WEAKNESS OF RIGHT HIP: ICD-10-CM

## 2024-06-14 DIAGNOSIS — Z47.89 ORTHOPEDIC AFTERCARE: ICD-10-CM

## 2024-06-14 DIAGNOSIS — S73.191A ACETABULAR LABRUM TEAR, RIGHT, INITIAL ENCOUNTER: Primary | ICD-10-CM

## 2024-06-14 DIAGNOSIS — M25.651 HIP STIFFNESS, RIGHT: ICD-10-CM

## 2024-06-14 PROCEDURE — 97140 MANUAL THERAPY 1/> REGIONS: CPT | Mod: GP | Performed by: PHYSICAL THERAPIST

## 2024-06-14 PROCEDURE — 97110 THERAPEUTIC EXERCISES: CPT | Mod: GP | Performed by: PHYSICAL THERAPIST

## 2024-06-14 ASSESSMENT — PAIN - FUNCTIONAL ASSESSMENT: PAIN_FUNCTIONAL_ASSESSMENT: 0-10

## 2024-06-14 ASSESSMENT — PAIN SCALES - GENERAL: PAINLEVEL_OUTOF10: 0 - NO PAIN

## 2024-06-14 NOTE — PROGRESS NOTES
Physical Therapy  Physical Therapy Treatment Note    Patient Name: Jessica Perez  MRN: [id number]  Today's Date: 6/14/24  Time Calculation  Start Time: 1200  Stop Time: 1300  Time Calculation (min): 60 min    Insurance:  Visit number: 14 of 49  Authorization info: no auth  Insurance Type: anthem     General:  Reason for visit: Post Op Right Hip Arthroscopy; Rim Trim; Labral reconstruction with allograft; Osteoplasty; Capsular Plication   Referred by: Dr. Errol CHAMBERS    Surgery Date: 4/25/2024     Precautions: No active ER> 20 degrees x 3 weeks, WBAT with crutches, and Avoid hip flexion isometrics x 4 weeks    Current Problem  1. Acetabular labrum tear, right, initial encounter  Follow Up In Physical Therapy      2. Orthopedic aftercare  Follow Up In Physical Therapy      3. Right hip pain  Follow Up In Physical Therapy      4. Hip stiffness, right  Follow Up In Physical Therapy      5. Weakness of right hip  Follow Up In Physical Therapy            Subjective:   Pt arrived to therapy reporting she is doing well.  She is not having any pain in her hip, but is feeling some soreness and stiffness in anterior hip.    Pain  Pain Assessment: 0-10  Pain Score: 0 - No pain      Performing HEP?: Yes        Objective:   Hip PROM (Degrees)                             (R)                    (L)  Flexion:            110                  120         Extension:        0                      0  Abduction:       45                    45  Adduction:       NT                    NT  ER:                    35                     40  IR:                     35                   35      Treatment Performed:    Therapeutic Exercise:                          40 min  Upright bike x10'  Side plank with clamshells 3x12 each  Single leg bridge with red TB loop 3x12  Band 3 way toe taps red TB loop x5 each  [place] KB box squat to bench 3x10  Prone hip ER/IR  Single leg squat from plinth 3x10  Single leg ball toss 7# 3x20      Manual Therapy:                                  20 min  PROM L hip flexion, circumduction, log rolling, hip IR/ER  STM adductor group, hip flexors, TFL, glute medius, rotators of the hip  STM to incisions   Inferior and lateral hip mobs with with belt assist grades 3-4      Access Code [email or web address]    Vaso:     NT  Vaso min compress 34 deg not billed  Assessment:   Jessica MAI Boporter is progressing towards their goals as evidenced by improving passive and active ROM R hip and improving R hip stability and strength.  Today's treatment was focused on continued improvement in hip joint mobility and strength of surrounding musculature.  We continued working on single leg squatting to plinth, which she performed without pain, however mild compensations of pelvic pronation and knee valgus occur on the surgical limb.  With cueing, however, she is able to to modify and correct movement and improve her form.  Patient would continue to benefit from skilled PT to address remaining functional, objective and subjective deficits to allow them to return to full independence with ADLs and iADLs.      Plan:  Continue manual therapy progress strength exercises and progress functional activities      Justina Walton, PT

## 2024-06-17 ENCOUNTER — OFFICE VISIT (OUTPATIENT)
Dept: ORTHOPEDIC SURGERY | Facility: HOSPITAL | Age: 24
End: 2024-06-17
Payer: COMMERCIAL

## 2024-06-17 ENCOUNTER — TREATMENT (OUTPATIENT)
Dept: PHYSICAL THERAPY | Facility: HOSPITAL | Age: 24
End: 2024-06-17
Payer: COMMERCIAL

## 2024-06-17 DIAGNOSIS — R29.898 WEAKNESS OF RIGHT HIP: ICD-10-CM

## 2024-06-17 DIAGNOSIS — M25.651 HIP STIFFNESS, RIGHT: ICD-10-CM

## 2024-06-17 DIAGNOSIS — S73.191A ACETABULAR LABRUM TEAR, RIGHT, INITIAL ENCOUNTER: ICD-10-CM

## 2024-06-17 DIAGNOSIS — S73.191D TEAR OF RIGHT ACETABULAR LABRUM, SUBSEQUENT ENCOUNTER: Primary | ICD-10-CM

## 2024-06-17 DIAGNOSIS — M25.551 RIGHT HIP PAIN: ICD-10-CM

## 2024-06-17 DIAGNOSIS — Z47.89 ORTHOPEDIC AFTERCARE: ICD-10-CM

## 2024-06-17 PROCEDURE — 99024 POSTOP FOLLOW-UP VISIT: CPT | Performed by: PHYSICIAN ASSISTANT

## 2024-06-17 PROCEDURE — 97110 THERAPEUTIC EXERCISES: CPT | Mod: GP | Performed by: PHYSICAL THERAPIST

## 2024-06-17 PROCEDURE — 97140 MANUAL THERAPY 1/> REGIONS: CPT | Mod: GP | Performed by: PHYSICAL THERAPIST

## 2024-06-17 ASSESSMENT — PAIN SCALES - GENERAL: PAINLEVEL_OUTOF10: 2

## 2024-06-17 ASSESSMENT — PAIN - FUNCTIONAL ASSESSMENT: PAIN_FUNCTIONAL_ASSESSMENT: 0-10

## 2024-06-17 NOTE — PROGRESS NOTES
Physical Therapy  Physical Therapy Treatment Note    Patient Name: Jessica Perez  MRN: [id number]  Today's Date: 6/17/2024  Time Calculation  Start Time: 1625  Stop Time: 1735  Time Calculation (min): 70 min    Insurance:  Visit number: 15 of 49  Authorization info: no auth  Insurance Type: anthem     General:  Reason for visit: Post Op Right Hip Arthroscopy; Rim Trim; Labral reconstruction with allograft; Osteoplasty; Capsular Plication   Referred by: Dr. Errol CHAMBERS    Surgery Date: 4/25/2024     Precautions: No active ER> 20 degrees x 3 weeks, WBAT with crutches, and Avoid hip flexion isometrics x 4 weeks    Current Problem  1. Acetabular labrum tear, right, initial encounter  Follow Up In Physical Therapy      2. Orthopedic aftercare  Follow Up In Physical Therapy      3. Right hip pain  Follow Up In Physical Therapy      4. Hip stiffness, right  Follow Up In Physical Therapy      5. Weakness of right hip  Follow Up In Physical Therapy            Subjective:   Pt arrived to therapy reporting she is noticing increase soreness and tightness in hip. Pt had follow up with ortho today and they agree she is stiff. Pt states she has not gotten to her HEP as freq lately    Pain  Pain Assessment: 0-10  Pain Score: 2      Performing HEP?: Yes        Objective:   Hip PROM (Degrees)                             (R)                    (L)  Flexion:            110                  120         Extension:        0                      0  Abduction:       45                    45  Adduction:       NT                    NT  ER:                    35                     40  IR:                     35                   35      Treatment Performed:    Therapeutic Exercise:                          25 min  Upright bike x8'  Prone quad stretch with strap  Seated butterfly stretch  Bridge with band  [place] KB box squat to bench 3x10 NT  Prone hip ER/IR        Manual Therapy:                                 35 min  PROM L hip flexion,  circumduction, log rolling, hip IR/ER  STM adductor group, hip flexors, TFL, glute medius, rotators of the hip  STM to incisions   Inferior and lateral hip mobs with with belt assist grades 3-4      Access Code [email or web address]    Vaso:     10  Vaso min compress 34 deg not billed in prone  Assessment:   Jessica Perez did have increase in hip stiffness and pain today compared to previous session. I believe this is most likely secondary to sitting more at work and not performing HEP as frequently. Pt encouraged to continue her mobility work      Plan:  Continue manual therapy progress strength exercises and progress functional activities      Edward Cornell, PT

## 2024-06-17 NOTE — PROGRESS NOTES
Patient is here today little over 6 weeks out from her right hip arthroscopy.  DOS 4/25/24.  Overall she is doing really well.  Has a little bit of soreness today.  She only had 1 session of physical therapy last week and stated today a busy weekend.  She is rather tight on exam today but states that she generally has more motion than what she has on exam today.  She has physical therapy later today.  Will make sure the defer a significant increase in strengthening until range of motion has improved.  I will see her in 6 weeks.        Yisel Lo PA-C

## 2024-06-19 ENCOUNTER — TREATMENT (OUTPATIENT)
Dept: PHYSICAL THERAPY | Facility: HOSPITAL | Age: 24
End: 2024-06-19
Payer: COMMERCIAL

## 2024-06-19 DIAGNOSIS — M25.551 RIGHT HIP PAIN: ICD-10-CM

## 2024-06-19 DIAGNOSIS — S73.191A ACETABULAR LABRUM TEAR, RIGHT, INITIAL ENCOUNTER: ICD-10-CM

## 2024-06-19 DIAGNOSIS — Z47.89 ORTHOPEDIC AFTERCARE: ICD-10-CM

## 2024-06-19 DIAGNOSIS — M25.651 HIP STIFFNESS, RIGHT: ICD-10-CM

## 2024-06-19 DIAGNOSIS — R29.898 WEAKNESS OF RIGHT HIP: ICD-10-CM

## 2024-06-19 PROCEDURE — 97110 THERAPEUTIC EXERCISES: CPT | Mod: GP | Performed by: PHYSICAL THERAPIST

## 2024-06-19 PROCEDURE — 97140 MANUAL THERAPY 1/> REGIONS: CPT | Mod: GP | Performed by: PHYSICAL THERAPIST

## 2024-06-19 ASSESSMENT — PAIN - FUNCTIONAL ASSESSMENT: PAIN_FUNCTIONAL_ASSESSMENT: 0-10

## 2024-06-19 ASSESSMENT — PAIN SCALES - GENERAL: PAINLEVEL_OUTOF10: 4

## 2024-06-20 NOTE — PROGRESS NOTES
"  Physical Therapy  Physical Therapy Treatment Note    Patient Name: Jessica Perez  MRN: [id number]  Today's Date: 6/19/2024  Time Calculation  Start Time: 1630  Stop Time: 1730  Time Calculation (min): 60 min    Insurance:  Visit number: 16 of 49  Authorization info: no auth  Insurance Type: anthem     General:  Reason for visit: Post Op Right Hip Arthroscopy; Rim Trim; Labral reconstruction with allograft; Osteoplasty; Capsular Plication   Referred by: Dr. Errol CHAMBERS    Surgery Date: 4/25/2024     Precautions: No active ER> 20 degrees x 3 weeks, WBAT with crutches, and Avoid hip flexion isometrics x 4 weeks    Current Problem  1. Acetabular labrum tear, right, initial encounter  Follow Up In Physical Therapy      2. Orthopedic aftercare  Follow Up In Physical Therapy      3. Right hip pain  Follow Up In Physical Therapy      4. Hip stiffness, right  Follow Up In Physical Therapy      5. Weakness of right hip  Follow Up In Physical Therapy            Subjective:   Pt arrived to therapy reporting she is noticing increase soreness and tightness in hip. Pt had follow up with ortho today and they agree she is stiff. Pt states she has not gotten to her HEP as freq lately    Pain  Pain Assessment: 0-10  0-10 (Numeric) Pain Score: 4      Performing HEP?: Yes        Objective:   Hip PROM (Degrees)                             (R)                    (L)  Flexion:            110*                  120         Extension:        0                      0  Abduction:       45                    45  Adduction:       NT                    NT  ER:                    35                     40  IR:                     35                   35      Treatment Performed:    Therapeutic Exercise:                          25 min  Upright bike x8'  Side plank+clamshell 4x10  Seated butterfly stretch  Single leg bridge 3x10  S/l hip abduction 3x10 \"difficult\"  Prone hip extension 4x10  Resisted side stepping*  Standing hip flexor " stretch  Hip flexor stretch in viral test position and foot on floor (no pain after but increase pain with walk)      Manual Therapy:                                 35 min  PROM L hip flexion, circumduction, log rolling, hip IR/ER  STM adductor group, hip flexors, TFL, glute medius, rotators of the hip  STM to incisions   Inferior and lateral hip mobs with with belt assist grades 3-4      Access Code [email or web address]    Vaso:     NT  Vaso min compress 34 deg not billed in prone  Assessment:   Jessica Perez had less pain today compared previous session but continues to have soft tissue irritation in hip flexors and adductor groups. Also has limitations in hip flexion compared to previous sessions. Pt was improved compared to Monday and responded well to glute exercises by reporting less anterior hip pain        Plan:  Continue manual therapy progress strength exercises and progress functional activities      Edward Cornell, PT

## 2024-06-24 ENCOUNTER — TREATMENT (OUTPATIENT)
Dept: PHYSICAL THERAPY | Facility: HOSPITAL | Age: 24
End: 2024-06-24
Payer: COMMERCIAL

## 2024-06-24 DIAGNOSIS — M25.651 HIP STIFFNESS, RIGHT: ICD-10-CM

## 2024-06-24 DIAGNOSIS — M25.551 RIGHT HIP PAIN: ICD-10-CM

## 2024-06-24 DIAGNOSIS — R29.898 WEAKNESS OF RIGHT HIP: ICD-10-CM

## 2024-06-24 DIAGNOSIS — S73.191A ACETABULAR LABRUM TEAR, RIGHT, INITIAL ENCOUNTER: ICD-10-CM

## 2024-06-24 DIAGNOSIS — Z47.89 ORTHOPEDIC AFTERCARE: ICD-10-CM

## 2024-06-24 PROCEDURE — 97140 MANUAL THERAPY 1/> REGIONS: CPT | Mod: GP | Performed by: PHYSICAL THERAPIST

## 2024-06-24 PROCEDURE — 97110 THERAPEUTIC EXERCISES: CPT | Mod: GP | Performed by: PHYSICAL THERAPIST

## 2024-06-24 ASSESSMENT — PAIN SCALES - GENERAL: PAINLEVEL_OUTOF10: 1

## 2024-06-24 ASSESSMENT — PAIN - FUNCTIONAL ASSESSMENT: PAIN_FUNCTIONAL_ASSESSMENT: 0-10

## 2024-06-24 NOTE — PROGRESS NOTES
Physical Therapy  Physical Therapy Treatment Note    Patient Name: Jessica Perez  MRN: [id number]  Today's Date: 6/19/2024  Time Calculation  Start Time: 1620  Stop Time: 1715  Time Calculation (min): 55 min    Insurance:  Visit number: 17 of 49  Authorization info: no auth  Insurance Type: anthem     General:  Reason for visit: Post Op Right Hip Arthroscopy; Rim Trim; Labral reconstruction with allograft; Osteoplasty; Capsular Plication   Referred by: Dr. Errol CHAMBERS    Surgery Date: 4/25/2024     Precautions: No active ER> 20 degrees x 3 weeks, WBAT with crutches, and Avoid hip flexion isometrics x 4 weeks    Current Problem  1. Acetabular labrum tear, right, initial encounter  Follow Up In Physical Therapy      2. Orthopedic aftercare  Follow Up In Physical Therapy      3. Right hip pain  Follow Up In Physical Therapy      4. Hip stiffness, right  Follow Up In Physical Therapy      5. Weakness of right hip  Follow Up In Physical Therapy            Subjective:   Pt arrived to therapy reporting she is feeling better overall today. Pt reports she still has soreness but much less. Pt reports    Pain  Pain Assessment: 0-10  0-10 (Numeric) Pain Score: 1      Performing HEP?: Yes        Objective:   Hip PROM (Degrees)                             (R)                    (L)  Flexion:            110*                  120         Extension:        0                      0  Abduction:       45                    45  Adduction:       NT                    NT  ER:                    35                     40  IR:                     35                   35      Treatment Performed:    Therapeutic Exercise:                          25 min  Access Code: [email or web address]  URL: [email or web address]  Date: 06/21/2024  Prepared by: Edward Cornell     Exercises  - Prone Quadriceps Stretch with Strap  - 1 sets - 3 reps - 30 hold  - Prone Bent Leg Fallout  - - 2 sets - 10 reps - 5 hold  - Prone Heel Squeeze   - 2 sets -  "10 reps - 5 hold  - Bridge with Resistance  - 1 sets - 15 reps  - Single Leg Bridge  - 3 sets - 10 reps  - Side Plank with Clam and Resistance  - 2 sets - 12 reps  - Standing Hip Flexor Stretch  - 1 sets  - Side Stepping with Resistance at Feet   - 3 sets - 10 reps  - Runner's Step Up/Down  - 3 sets - 12 reps  - Upright Bike  - 10 hold      Manual Therapy:                                 30 min  PROM L hip flexion, circumduction, log rolling, hip IR/ER  STM adductor group, hip flexors, TFL, glute medius, rotators of the hip  STM to incisions   Inferior and lateral hip mobs with with belt assist grades 3-4      Access Code [email or web address]    Vaso:     NT  Vaso min compress 34 deg not billed in prone  Assessment:   Jessica Perez had less pain today compared previous session. Pt hip flexion is also improved hip flexion. Pt is responding well to glute exercises to \"calm down\" anterior hip pain. Pt will continue to benefit from further therapy to decrease anterior hip pain, improve hip ROM, and progress strength    Plan:  Continue manual therapy progress strength exercises and progress functional activities      Edward Cornell, PT   "

## 2024-06-26 ENCOUNTER — TREATMENT (OUTPATIENT)
Dept: PHYSICAL THERAPY | Facility: HOSPITAL | Age: 24
End: 2024-06-26
Payer: COMMERCIAL

## 2024-06-26 DIAGNOSIS — Z47.89 ORTHOPEDIC AFTERCARE: ICD-10-CM

## 2024-06-26 DIAGNOSIS — R29.898 WEAKNESS OF RIGHT HIP: ICD-10-CM

## 2024-06-26 DIAGNOSIS — M25.551 RIGHT HIP PAIN: ICD-10-CM

## 2024-06-26 DIAGNOSIS — M25.651 HIP STIFFNESS, RIGHT: ICD-10-CM

## 2024-06-26 DIAGNOSIS — S73.191A ACETABULAR LABRUM TEAR, RIGHT, INITIAL ENCOUNTER: ICD-10-CM

## 2024-06-26 PROCEDURE — 97140 MANUAL THERAPY 1/> REGIONS: CPT | Mod: GP | Performed by: PHYSICAL THERAPIST

## 2024-06-26 PROCEDURE — 97110 THERAPEUTIC EXERCISES: CPT | Mod: GP | Performed by: PHYSICAL THERAPIST

## 2024-06-26 ASSESSMENT — PAIN - FUNCTIONAL ASSESSMENT: PAIN_FUNCTIONAL_ASSESSMENT: 0-10

## 2024-06-26 ASSESSMENT — PAIN SCALES - GENERAL: PAINLEVEL_OUTOF10: 1

## 2024-06-26 NOTE — PROGRESS NOTES
Physical Therapy  Physical Therapy Treatment Note    Patient Name: Jessica Perez  MRN: [id number]  Today's Date: 6/26/2024   Time Calculation  Start Time: 1204  Stop Time: 1257  Time Calculation (min): 53 min    Insurance:  Visit number: 18 of 49  Authorization info: no auth  Insurance Type: anthem     General:  Reason for visit: Post Op Right Hip Arthroscopy; Rim Trim; Labral reconstruction with allograft; Osteoplasty; Capsular Plication   Referred by: Dr. Errol CHAMBERS    Surgery Date: 4/25/2024     Precautions: No active ER> 20 degrees x 3 weeks, WBAT with crutches, and Avoid hip flexion isometrics x 4 weeks    Current Problem  1. Acetabular labrum tear, right, initial encounter  Follow Up In Physical Therapy      2. Orthopedic aftercare  Follow Up In Physical Therapy      3. Right hip pain  Follow Up In Physical Therapy      4. Hip stiffness, right  Follow Up In Physical Therapy      5. Weakness of right hip  Follow Up In Physical Therapy            Subjective:   Pt arrived to therapy reporting she is feeling significantly better than previous session. Pt believe the leg press from previous session made her sore. Pt is getting up and walking more at work    Pain  Pain Assessment: 0-10  0-10 (Numeric) Pain Score: 1      Performing HEP?: Yes        Objective:   Hip PROM (Degrees)                             (R)                    (L)  Flexion:            115                  120         Extension:        0                      0  Abduction:       45                    45  Adduction:       40                    NT  ER:                    40                    40  IR:                     45                 45      Treatment Performed:    Therapeutic Exercise:                          23 min  Access Code: [email or web address]  URL: [email or web address]  Date: 06/21/2024  Prepared by: Edward Cornell     Exercises  - Prone Quadriceps Stretch with Strap  - 1 sets - 3 reps - 30 hold  - Prone Bent Leg Fallout  -  - 2 sets - 10 reps - 5 hold  - Prone Heel Squeeze   - 2 sets - 10 reps - 5 hold  - Bridge with Resistance  - 1 sets - 15 reps  - Single Leg Bridge  - 3 sets - 10 reps  - Side Plank with Clam and Resistance  - 2 sets - 12 reps  - Standing Hip Flexor Stretch  - 1 sets  - Side Stepping with Resistance at Feet   - 3 sets - 10 reps NT  - BW squats from 60 deg 2x12  - Upright Bike  - 5min      Manual Therapy:                                 30 min  PROM L hip flexion, circumduction, log rolling, hip IR/ER  STM adductor group, hip flexors, TFL, glute medius, rotators of the hip  STM to incisions NT  Inferior and lateral hip mobs with with belt assist grades 3-4      Access Code [email or web address]    Vaso:     NT  Vaso min compress 34 deg not billed in prone  Assessment:   Jessica Perez continues to progress with less pain and improved mobility. We need to continue to focus on hip flexion ROM and glute strength. Want to progress slow as things are improving and do not want flare him up    Plan:  Continue manual therapy progress strength exercises and progress functional activities      Edward Cornell, PT

## 2024-07-01 ENCOUNTER — APPOINTMENT (OUTPATIENT)
Dept: PHYSICAL THERAPY | Facility: HOSPITAL | Age: 24
End: 2024-07-01
Payer: COMMERCIAL

## 2024-07-01 ENCOUNTER — TREATMENT (OUTPATIENT)
Dept: PHYSICAL THERAPY | Facility: HOSPITAL | Age: 24
End: 2024-07-01
Payer: COMMERCIAL

## 2024-07-01 DIAGNOSIS — S73.191A ACETABULAR LABRUM TEAR, RIGHT, INITIAL ENCOUNTER: ICD-10-CM

## 2024-07-01 DIAGNOSIS — R29.898 WEAKNESS OF RIGHT HIP: ICD-10-CM

## 2024-07-01 DIAGNOSIS — Z47.89 ORTHOPEDIC AFTERCARE: ICD-10-CM

## 2024-07-01 DIAGNOSIS — M25.651 HIP STIFFNESS, RIGHT: ICD-10-CM

## 2024-07-01 DIAGNOSIS — M25.551 RIGHT HIP PAIN: ICD-10-CM

## 2024-07-01 PROCEDURE — 97140 MANUAL THERAPY 1/> REGIONS: CPT | Mod: GP | Performed by: PHYSICAL THERAPIST

## 2024-07-01 PROCEDURE — 97110 THERAPEUTIC EXERCISES: CPT | Mod: GP | Performed by: PHYSICAL THERAPIST

## 2024-07-01 ASSESSMENT — PAIN - FUNCTIONAL ASSESSMENT: PAIN_FUNCTIONAL_ASSESSMENT: 0-10

## 2024-07-01 ASSESSMENT — PAIN SCALES - GENERAL: PAINLEVEL_OUTOF10: 0 - NO PAIN

## 2024-07-01 NOTE — PROGRESS NOTES
Physical Therapy  Physical Therapy Treatment Note    Patient Name: Jessica Perez  MRN: [id number]  Today's Date: 7/1/2024   Time Calculation  Start Time: 1600  Stop Time: 1655  Time Calculation (min): 55 min    Insurance:  Visit number: 19 of 49  Authorization info: no auth  Insurance Type: anthem     General:  Reason for visit: Post Op Right Hip Arthroscopy; Rim Trim; Labral reconstruction with allograft; Osteoplasty; Capsular Plication   Referred by: Dr. Errol CHAMBERS    Surgery Date: 4/25/2024     Precautions: No active ER> 20 degrees x 3 weeks, WBAT with crutches, and Avoid hip flexion isometrics x 4 weeks    Current Problem  1. Acetabular labrum tear, right, initial encounter  Follow Up In Physical Therapy      2. Orthopedic aftercare  Follow Up In Physical Therapy      3. Right hip pain  Follow Up In Physical Therapy      4. Hip stiffness, right  Follow Up In Physical Therapy      5. Weakness of right hip  Follow Up In Physical Therapy            Subjective:   Pt arrived to therapy reporting she is feeling well overall. Pt states she feels a little tight. Pt reports no issues with HEP    Pain  Pain Assessment: 0-10  0-10 (Numeric) Pain Score: 0 - No pain      Performing HEP?: Yes        Objective:   Hip PROM (Degrees)                             (R)                    (L)  Flexion:            115                  120         Extension:        0                      0  Abduction:       45                    45  Adduction:       40                    NT  ER:                    40                    40  IR:                     45                 45      Treatment Performed:    Therapeutic Exercise:                          30 min  Access Code: [email or web address]  URL: [email or web address]  Date: 06/21/2024  Prepared by: Edward Cornell     Exercises  - Prone Quadriceps Stretch with Strap  - 1 sets - 3 reps - 30 hold  - Prone Bent Leg Fallout  - - 2 sets - 10 reps - 5 hold NT  - Prone Heel Squeeze   - 2  "sets - 10 reps - 5 hold NT  - Bridge with Resistance  - 1 sets - 15 reps  - Single Leg Bridge  - 3 sets - 10 reps  - Side Plank with Clam and Resistance  - 2 sets - 12 reps  -goblet squat pink KB to bench 3x10  -Matrix knee extension 45# 3x10  Lateral step down 4\" box 3x10  -forward step up 6\" box+ pink KB 3x12  -upright bike x 5'      Manual Therapy:                                 25 min  PROM L hip flexion, circumduction, log rolling, hip IR/ER  STM adductor group, hip flexors, TFL, glute medius, rotators of the hip  STM to incisions NT  Inferior and lateral hip mobs with with belt assist grades 3-4      Access Code [email or web address]    Vaso:     NT  Vaso min compress 34 deg not billed in prone  Assessment:   Jessica Perez continues to progress with less pain and improved mobility. We progressed CKC activities with no issues. Pt will be out the rest of the week secondary to the holiday and we will follow up next week    Plan:  Continue manual therapy progress strength exercises and progress functional activities      Edward Cornell, PT   "

## 2024-07-10 ENCOUNTER — TREATMENT (OUTPATIENT)
Dept: PHYSICAL THERAPY | Facility: HOSPITAL | Age: 24
End: 2024-07-10
Payer: COMMERCIAL

## 2024-07-10 DIAGNOSIS — S73.191A ACETABULAR LABRUM TEAR, RIGHT, INITIAL ENCOUNTER: ICD-10-CM

## 2024-07-10 DIAGNOSIS — M25.651 HIP STIFFNESS, RIGHT: ICD-10-CM

## 2024-07-10 DIAGNOSIS — M25.551 RIGHT HIP PAIN: ICD-10-CM

## 2024-07-10 DIAGNOSIS — R29.898 WEAKNESS OF RIGHT HIP: ICD-10-CM

## 2024-07-10 DIAGNOSIS — Z47.89 ORTHOPEDIC AFTERCARE: ICD-10-CM

## 2024-07-10 PROCEDURE — 97140 MANUAL THERAPY 1/> REGIONS: CPT | Mod: GP | Performed by: PHYSICAL THERAPIST

## 2024-07-10 PROCEDURE — 97110 THERAPEUTIC EXERCISES: CPT | Mod: GP | Performed by: PHYSICAL THERAPIST

## 2024-07-10 ASSESSMENT — PAIN - FUNCTIONAL ASSESSMENT
PAIN_FUNCTIONAL_ASSESSMENT: 0-10
PAIN_FUNCTIONAL_ASSESSMENT: 0-10

## 2024-07-10 ASSESSMENT — PAIN SCALES - GENERAL
PAINLEVEL_OUTOF10: 10 - WORST POSSIBLE PAIN
PAINLEVEL_OUTOF10: 0 - NO PAIN

## 2024-07-10 NOTE — PROGRESS NOTES
Physical Therapy  Physical Therapy Treatment Note    Patient Name: Jessica Perez  MRN: [id number]  Today's Date: 7/10/2024  Time Calculation  Start Time: 1605  Stop Time: 1700  Time Calculation (min): 55 min    Insurance:  Visit number: 20 of 49  Authorization info: no auth  Insurance Type: anthem       Current Problem  1. Acetabular labrum tear, right, initial encounter  Follow Up In Physical Therapy      2. Orthopedic aftercare  Follow Up In Physical Therapy      3. Right hip pain  Follow Up In Physical Therapy      4. Hip stiffness, right  Follow Up In Physical Therapy      5. Weakness of right hip  Follow Up In Physical Therapy          Precautions:  No active ER> 20 degrees x 3 weeks, WBAT with crutches, and Avoid hip flexion isometrics x 4 weeks     General  Reason for Referral: Post Op Right Hip Arthroscopy; Rim Trim; Labral reconstruction with allograft; Osteoplasty; Capsular Plication  Referred By: Dr. Errol CHAMBERS    Pain  Pain Assessment: 0-10  0-10 (Numeric) Pain Score: 10 - Worst possible pain    Subjective:   Patient reports she is doing well overall. Pt states she was on vacation where she had to walk 5 miles in one day up hills but only felt a little sore.       Performing HEP?: Partially      Objective:   Hip PROM (Degrees)                             (R)                    (L)  Flexion:            115                  120         Extension:        0                      0  Abduction:       45                    45  Adduction:       40                    NT  ER:                    40                    40  IR:                     45                 45      Treatment Performed:  Therapeutic Exercise  Therapeutic Exercise Activity 1: upright bike x8 min  Therapeutic Exercise Activity 2: hook lying bridge with band 1x15  Therapeutic Exercise Activity 3: single leg bridge 2x10 each  Therapeutic Exercise Activity 4: resisted side stepping x5 yards+unilateral hip ER red TB loop x6  Therapeutic Exercise  "Activity 5: lateral step up 6\" box 2xpink KB 3x12  Therapeutic Exercise Activity 6: captain analia vs PB vs wall 2-3 sec holds  Therapeutic Exercise Activity 7: cross over step up 8\" box 3x12  Therapeutic Exercise Activity 8: total gym DL press 2 cords 3x12      Manual Therapy:                                 25 min  PROM L hip flexion, circumduction, log rolling, hip IR/ER  STM adductor group, hip flexors, TFL, glute medius, rotators of the hip  STM to incisions NT  Inferior and lateral hip mobs with with belt assist grades 3-4    Assessment:   The focus of the session was Strengthening, ROM, joint mobilizations, and soft tissue massage. The pt demonstrated Good tolerance to the noted exercises today. The pt is demonstrated Good progress in skilled rehab at this time. The pt is still limited in overall Strength and ROM at this time. The pt continues to be a good candidate for skilled PT, in order to further improve Strength, ROM, Motor control, Balance, and Gait mechanics.     Education: imporance of HEP    Plan:  Continue to progress mobility with manual techniques and progress strength as tolerated    Goals:  Active       PT Problem       PT Goal 1       Start:  04/29/24    Expected End:  10/29/24       Patient will report <4/10 pain in R hip by 4 weeks   PROM R hip flexion 90 deg by 3-4 weeks, 110 deg by 6 weeks, abduction 20 deg by 3 weeks, 40 deg by 6 weeks, ER 20 deg by 3 weeks, and IR 30 deg by 4-6 weeks to demonstrate improved joint mechanics to perform ADLs   LTG 12-16 weeks R hip strength: flexion, abduction, ER/IR, extension, glute max  >4+ to 5/5 MMT or 80% to demonstrate pelvic stability during ADLs and higher level functional tasks   Patient able to perform SL squat without valgus or anterior hip impingement to demonstrate LE biomechanics by 6- 8 weeks   LEFS > 30/80 to demonstrate improved ability to perform ADLs by 6 weeks               Edward Cornell PT, DPT, OCS, C-OMPT, ITPT  "

## 2024-07-15 ENCOUNTER — TREATMENT (OUTPATIENT)
Dept: PHYSICAL THERAPY | Facility: HOSPITAL | Age: 24
End: 2024-07-15
Payer: COMMERCIAL

## 2024-07-15 DIAGNOSIS — R29.898 WEAKNESS OF RIGHT HIP: ICD-10-CM

## 2024-07-15 DIAGNOSIS — M25.651 HIP STIFFNESS, RIGHT: ICD-10-CM

## 2024-07-15 DIAGNOSIS — S73.191A ACETABULAR LABRUM TEAR, RIGHT, INITIAL ENCOUNTER: ICD-10-CM

## 2024-07-15 DIAGNOSIS — M25.551 RIGHT HIP PAIN: ICD-10-CM

## 2024-07-15 DIAGNOSIS — Z47.89 ORTHOPEDIC AFTERCARE: ICD-10-CM

## 2024-07-15 PROCEDURE — 97110 THERAPEUTIC EXERCISES: CPT | Mod: GP | Performed by: PHYSICAL THERAPIST

## 2024-07-15 PROCEDURE — 97140 MANUAL THERAPY 1/> REGIONS: CPT | Mod: GP | Performed by: PHYSICAL THERAPIST

## 2024-07-16 ASSESSMENT — PAIN SCALES - GENERAL: PAINLEVEL_OUTOF10: 10 - WORST POSSIBLE PAIN

## 2024-07-16 ASSESSMENT — PAIN - FUNCTIONAL ASSESSMENT: PAIN_FUNCTIONAL_ASSESSMENT: 0-10

## 2024-07-16 NOTE — PROGRESS NOTES
Physical Therapy  Physical Therapy Treatment Note    Patient Name: Jessica Perez  MRN: [id number]  Today's Date: 7/15/2024  Time Calculation  Start Time: 1630  Stop Time: 1725  Time Calculation (min): 55 min    Insurance:  Visit number: 21 of 49  Authorization info: no auth  Insurance Type: anthem       Current Problem  1. Acetabular labrum tear, right, initial encounter  Follow Up In Physical Therapy      2. Orthopedic aftercare  Follow Up In Physical Therapy      3. Right hip pain  Follow Up In Physical Therapy      4. Hip stiffness, right  Follow Up In Physical Therapy      5. Weakness of right hip  Follow Up In Physical Therapy          Precautions:  No active ER> 20 degrees x 3 weeks, WBAT with crutches, and Avoid hip flexion isometrics x 4 weeks     General  Reason for Referral: Post Op Right Hip Arthroscopy; Rim Trim; Labral reconstruction with allograft; Osteoplasty; Capsular Plication  Referred By: Dr. Errol CHAMBERS    Pain  Pain Assessment: 0-10  0-10 (Numeric) Pain Score: 10 - Worst possible pain    Subjective:   Pt arrived to therapy reporting she is feeling well today. Pt states she was sore after last session but resolved in 2 days      Performing HEP?: Partially      Objective:   Hip PROM (Degrees)                             (R)                    (L)  Flexion:            115                  120         Extension:        0                      0  Abduction:       45                    45  Adduction:       40                    NT  ER:                    40                    40  IR:                     45                 45      Treatment Performed:  Therapeutic Exercise  Therapeutic Exercise Activity 1: upright bike x8 min  Therapeutic Exercise Activity 2: hook lying bridge with band 1x15  Therapeutic Exercise Activity 3: single leg bridge 2x10 each  Therapeutic Exercise Activity 4: resisted side stepping x5 yards+unilateral hip ER red TB loop x6  Therapeutic Exercise Activity 5: lateral step up  "6\" box 2xpink KB 3x12  Therapeutic Exercise Activity 6: goblet squats blue KB 3x10  Therapeutic Exercise Activity 7: 3 way step RTB loop x5  Therapeutic Exercise Activity 8: total gym DL press 2 cords 3x12      Manual Therapy:                                 25 min  PROM L hip flexion, circumduction, log rolling, hip IR/ER  STM adductor group, hip flexors, TFL, glute medius, rotators of the hip  STM to incisions NT  Inferior and lateral hip mobs with with belt assist grades 3-4    Assessment:   The focus of the session was Strengthening, ROM, joint mobilizations, and soft tissue massage. The pt demonstrated Good tolerance to the noted exercises today. The pt is demonstrated Good progress in skilled rehab at this time. The pt is still limited in overall Strength and ROM at this time. The pt continues to be a good candidate for skilled PT, in order to further improve Strength, ROM, Motor control, Balance, and Gait mechanics.     Education: imporance of HEP    Plan:  Continue to progress mobility with manual techniques and progress strength as tolerated    Goals:  Active       PT Problem       PT Goal 1       Start:  04/29/24    Expected End:  10/29/24       Patient will report <4/10 pain in R hip by 4 weeks   PROM R hip flexion 90 deg by 3-4 weeks, 110 deg by 6 weeks, abduction 20 deg by 3 weeks, 40 deg by 6 weeks, ER 20 deg by 3 weeks, and IR 30 deg by 4-6 weeks to demonstrate improved joint mechanics to perform ADLs   LTG 12-16 weeks R hip strength: flexion, abduction, ER/IR, extension, glute max  >4+ to 5/5 MMT or 80% to demonstrate pelvic stability during ADLs and higher level functional tasks   Patient able to perform SL squat without valgus or anterior hip impingement to demonstrate LE biomechanics by 6- 8 weeks   LEFS > 30/80 to demonstrate improved ability to perform ADLs by 6 weeks               Edward Cornell PT, DPT, OCS, C-OMPT, ITPT  "

## 2024-07-17 ENCOUNTER — TREATMENT (OUTPATIENT)
Dept: PHYSICAL THERAPY | Facility: HOSPITAL | Age: 24
End: 2024-07-17
Payer: COMMERCIAL

## 2024-07-17 DIAGNOSIS — M25.651 HIP STIFFNESS, RIGHT: ICD-10-CM

## 2024-07-17 DIAGNOSIS — M25.551 RIGHT HIP PAIN: ICD-10-CM

## 2024-07-17 DIAGNOSIS — S73.191A ACETABULAR LABRUM TEAR, RIGHT, INITIAL ENCOUNTER: ICD-10-CM

## 2024-07-17 DIAGNOSIS — R29.898 WEAKNESS OF RIGHT HIP: ICD-10-CM

## 2024-07-17 DIAGNOSIS — Z47.89 ORTHOPEDIC AFTERCARE: ICD-10-CM

## 2024-07-17 PROCEDURE — 97110 THERAPEUTIC EXERCISES: CPT | Mod: GP | Performed by: PHYSICAL THERAPIST

## 2024-07-17 PROCEDURE — 97140 MANUAL THERAPY 1/> REGIONS: CPT | Mod: GP | Performed by: PHYSICAL THERAPIST

## 2024-07-17 ASSESSMENT — PAIN - FUNCTIONAL ASSESSMENT: PAIN_FUNCTIONAL_ASSESSMENT: 0-10

## 2024-07-17 ASSESSMENT — PAIN SCALES - GENERAL: PAINLEVEL_OUTOF10: 0 - NO PAIN

## 2024-07-18 NOTE — PROGRESS NOTES
Physical Therapy  Physical Therapy Treatment Note    Patient Name: Jessica Perez  MRN: [id number]  Today's Date: 7/17/2024  Time Calculation  Start Time: 1600  Stop Time: 1655  Time Calculation (min): 55 min    Insurance:  Visit number: 22 of 49  Authorization info: no auth  Insurance Type: anthem    Current Problem  1. Acetabular labrum tear, right, initial encounter  Follow Up In Physical Therapy      2. Orthopedic aftercare  Follow Up In Physical Therapy      3. Right hip pain  Follow Up In Physical Therapy      4. Hip stiffness, right  Follow Up In Physical Therapy      5. Weakness of right hip  Follow Up In Physical Therapy          Precautions:      General  Reason for Referral: Post Op Right Hip Arthroscopy; Rim Trim; Labral reconstruction with allograft; Osteoplasty; Capsular Plication  Referred By: Dr. Errol CHAMBERS    Pain  Pain Assessment: 0-10  0-10 (Numeric) Pain Score: 0 - No pain    Subjective:   Patient reports she is tired today. Pt had to do a lot of moving at work that required lifting and a lot of stairs. Pt would like to primarily focus on mobility work      Performing HEP?: Partially      Objective:   Hip PROM (Degrees)                             (R)                    (L)  Flexion:            115                  120         Extension:        0                      0  Abduction:       45                    45  Adduction:       40                    NT  ER:                    40                    40  IR:                     45                 45         Treatment Performed:  Therapeutic Exercise  Therapeutic Exercise Activity 1: upright bike x8 min  Therapeutic Exercise Activity 2: hook lying bridge with band 1x15  Therapeutic Exercise Activity 3: single leg bridge 2x10 each  Therapeutic Exercise Activity 4: s/l hip abduction 3x10  Therapeutic Exercise Activity 5: prone quad stretch  Therapeutic Exercise Activity 6: clamshell red TB loop 3x12  Therapeutic Exercise Activity 7: prone reisted  hip IR red TB loop 2x15    Manual Therapy  Manual Therapy Activity 1: PROM L hip flexion, circumduction, log rolling, hip IR/ER  STM adductor group, hip flexors, TFL, glute medius, rotators of the hip  STM to incisions NT  Inferior and lateral hip mobs with with belt assist grades 3-4        Assessment:   The focus of the session was Strengthening, ROM, Stretching, joint mobilizations, and soft tissue massage. The pt demonstrated Good tolerance to the noted exercises today. The pt is demonstrated Good progress in skilled rehab at this time. The pt is still limited in overall Strength, ROM, and Flexibility at this time. The pt continues to be a good candidate for skilled PT, in order to further improve Strength, ROM, Flexibility, Motor control, Balance, and Pain.     Education: importance of continuing to perform HEP outside of clinic    Plan:  Continue to progress ROM with STM and joint mobilization. Progress functional strength as tolerated    Goals:  Active       PT Problem       PT Goal 1       Start:  04/29/24    Expected End:  10/29/24       Patient will report <4/10 pain in R hip by 4 weeks   PROM R hip flexion 90 deg by 3-4 weeks, 110 deg by 6 weeks, abduction 20 deg by 3 weeks, 40 deg by 6 weeks, ER 20 deg by 3 weeks, and IR 30 deg by 4-6 weeks to demonstrate improved joint mechanics to perform ADLs   LTG 12-16 weeks R hip strength: flexion, abduction, ER/IR, extension, glute max  >4+ to 5/5 MMT or 80% to demonstrate pelvic stability during ADLs and higher level functional tasks   Patient able to perform SL squat without valgus or anterior hip impingement to demonstrate LE biomechanics by 6- 8 weeks   LEFS > 30/80 to demonstrate improved ability to perform ADLs by 6 weeks               Edward Cornell PT, DPT, OCS, C-OMPT, ITPT

## 2024-07-22 ENCOUNTER — TREATMENT (OUTPATIENT)
Dept: PHYSICAL THERAPY | Facility: HOSPITAL | Age: 24
End: 2024-07-22
Payer: COMMERCIAL

## 2024-07-22 DIAGNOSIS — M25.551 RIGHT HIP PAIN: ICD-10-CM

## 2024-07-22 DIAGNOSIS — S73.191A ACETABULAR LABRUM TEAR, RIGHT, INITIAL ENCOUNTER: ICD-10-CM

## 2024-07-22 DIAGNOSIS — M25.651 HIP STIFFNESS, RIGHT: ICD-10-CM

## 2024-07-22 DIAGNOSIS — Z47.89 ORTHOPEDIC AFTERCARE: ICD-10-CM

## 2024-07-22 DIAGNOSIS — R29.898 WEAKNESS OF RIGHT HIP: ICD-10-CM

## 2024-07-22 PROCEDURE — 97110 THERAPEUTIC EXERCISES: CPT | Mod: GP | Performed by: PHYSICAL THERAPIST

## 2024-07-22 PROCEDURE — 97140 MANUAL THERAPY 1/> REGIONS: CPT | Mod: GP | Performed by: PHYSICAL THERAPIST

## 2024-07-22 ASSESSMENT — PAIN - FUNCTIONAL ASSESSMENT: PAIN_FUNCTIONAL_ASSESSMENT: 0-10

## 2024-07-22 ASSESSMENT — PAIN SCALES - GENERAL: PAINLEVEL_OUTOF10: 0 - NO PAIN

## 2024-07-22 NOTE — PROGRESS NOTES
Physical Therapy  Physical Therapy Treatment Note    Patient Name: Jessica Perez  MRN: [id number]  Today's Date: 7/22/2024  Time Calculation  Start Time: 1615  Stop Time: 1710  Time Calculation (min): 55 min    Insurance:  Visit number: 23 of 49  Authorization info: no auth  Insurance Type: anthem    Current Problem  1. Acetabular labrum tear, right, initial encounter  Follow Up In Physical Therapy      2. Orthopedic aftercare  Follow Up In Physical Therapy      3. Right hip pain  Follow Up In Physical Therapy      4. Hip stiffness, right  Follow Up In Physical Therapy      5. Weakness of right hip  Follow Up In Physical Therapy          Precautions:      General  Reason for Referral: Post Op Right Hip Arthroscopy; Rim Trim; Labral reconstruction with allograft; Osteoplasty; Capsular Plication  Referred By: Dr. Errol CHAMBERS    Pain  Pain Assessment: 0-10  0-10 (Numeric) Pain Score: 0 - No pain    Subjective:   Patient reports she is doing well today. Pt reports less pain and more ROM      Performing HEP?: Yes      Objective:   HIP       Hip PROM  R hip flexion: (125°): 110    Treatment Performed:  Therapeutic Exercise  Therapeutic Exercise Activity 2: hook lying bridge with band 1x15  Therapeutic Exercise Activity 3: single leg bridge 2x10 each  Therapeutic Exercise Activity 5: prone quad stretch  Therapeutic Exercise Activity 6: clamshell red TB loop 3x12  Therapeutic Exercise Activity 7: prone reisted hip IR red TB loop 2x15  Therapeutic Exercise Activity 8: total gym DL press 2 cords 3x12  Therapeutic Exercise Activity 9: captain helms isometric 2x10    Manual Therapy  Manual Therapy Activity 1: left Labral Repair; Rim Trim; Osteoplasty; Capsular Plication      Assessment:   The focus of the session was Strengthening, ROM, Stretching, joint mobilizations, soft tissue massage, and functional training. The pt demonstrated Good tolerance to the noted exercises today. The pt is demonstrated Good progress in  skilled rehab at this time. The pt is still limited in overall Strength, ROM, and Motor control at this time. The pt continues to be a good candidate for skilled PT, in order to further improve Strength, ROM, Motor control, and Pain.     Education: importance of HEP    Plan:  Continue to progress ROM and strength    Goals:  Active       PT Problem       PT Goal 1       Start:  04/29/24    Expected End:  10/29/24       Patient will report <4/10 pain in R hip by 4 weeks   PROM R hip flexion 90 deg by 3-4 weeks, 110 deg by 6 weeks, abduction 20 deg by 3 weeks, 40 deg by 6 weeks, ER 20 deg by 3 weeks, and IR 30 deg by 4-6 weeks to demonstrate improved joint mechanics to perform ADLs   LTG 12-16 weeks R hip strength: flexion, abduction, ER/IR, extension, glute max  >4+ to 5/5 MMT or 80% to demonstrate pelvic stability during ADLs and higher level functional tasks   Patient able to perform SL squat without valgus or anterior hip impingement to demonstrate LE biomechanics by 6- 8 weeks   LEFS > 30/80 to demonstrate improved ability to perform ADLs by 6 weeks               Edward Cornell PT, DPT, OCS, C-OMPT, ITPT

## 2024-07-24 ENCOUNTER — TREATMENT (OUTPATIENT)
Dept: PHYSICAL THERAPY | Facility: HOSPITAL | Age: 24
End: 2024-07-24
Payer: COMMERCIAL

## 2024-07-24 DIAGNOSIS — S73.191A ACETABULAR LABRUM TEAR, RIGHT, INITIAL ENCOUNTER: ICD-10-CM

## 2024-07-24 DIAGNOSIS — M25.551 RIGHT HIP PAIN: ICD-10-CM

## 2024-07-24 DIAGNOSIS — Z47.89 ORTHOPEDIC AFTERCARE: ICD-10-CM

## 2024-07-24 DIAGNOSIS — R29.898 WEAKNESS OF RIGHT HIP: ICD-10-CM

## 2024-07-24 DIAGNOSIS — M25.651 HIP STIFFNESS, RIGHT: ICD-10-CM

## 2024-07-24 PROCEDURE — 97140 MANUAL THERAPY 1/> REGIONS: CPT | Mod: GP | Performed by: PHYSICAL THERAPIST

## 2024-07-24 PROCEDURE — 97110 THERAPEUTIC EXERCISES: CPT | Mod: GP | Performed by: PHYSICAL THERAPIST

## 2024-07-24 ASSESSMENT — PAIN SCALES - GENERAL: PAINLEVEL_OUTOF10: 0 - NO PAIN

## 2024-07-24 ASSESSMENT — PAIN - FUNCTIONAL ASSESSMENT: PAIN_FUNCTIONAL_ASSESSMENT: 0-10

## 2024-07-24 NOTE — PROGRESS NOTES
"  Physical Therapy  Physical Therapy Treatment Note    Patient Name: Jessica Perez  MRN: [id number]  Today's Date: 7/24/2024  Time Calculation  Start Time: 1600  Stop Time: 1700  Time Calculation (min): 60 min    Insurance:  Visit number: 24 of 49  Authorization info: no auth  Insurance Type: anthem    Current Problem  1. Acetabular labrum tear, right, initial encounter  Follow Up In Physical Therapy      2. Orthopedic aftercare  Follow Up In Physical Therapy      3. Right hip pain  Follow Up In Physical Therapy      4. Hip stiffness, right  Follow Up In Physical Therapy      5. Weakness of right hip  Follow Up In Physical Therapy          Precautions:      General  Reason for Referral: Post Op Right Hip Arthroscopy; Rim Trim; Labral reconstruction with allograft; Osteoplasty; Capsular Plication  Referred By: Dr. Errol CHAMBERS    Pain  Pain Assessment: 0-10  0-10 (Numeric) Pain Score: 0 - No pain    Subjective:   Patient reports she is feeling good today. Pt denies pain. Pt reports slightly sore after last visit but went away the next day      Performing HEP?: Yes      Objective:   HIP    Hip PROM  R hip flexion: (125°): 110  R hip abduction: (45°): 45  R hip extension: (10°): 10  R hip ER: (45°): 40  R hip IR: (45°): 35    Treatment Performed:  Therapeutic Exercise  Therapeutic Exercise Activity 1: upright bike x6  Therapeutic Exercise Activity 2: hook lying bridge with band 1x15  Therapeutic Exercise Activity 3: single leg bridge 2x10 each  Therapeutic Exercise Activity 4: s/l hip abduction 3x10  Therapeutic Exercise Activity 5: prone quad stretch  Therapeutic Exercise Activity 6: clamshell red TB loop 3x12  Therapeutic Exercise Activity 7: resisted side stepping 5 yards x6 OTB loop  Therapeutic Exercise Activity 8: total gym DL press 2 cords 3x12  Therapeutic Exercise Activity 9: single leg squat to 60 deg to plinth with stick 3x10  Therapeutic Exercise Activity 10: fwd step up with 2 pink KB 8\" box " "3x10  Therapeutic Exercise Activity 11: lateral heel tap 4\" box with stick 3x10  Therapeutic Exercise Activity 12: sinle leg ball toss vs rebounder 7# ball 3x20    Manual Therapy  Manual Therapy Activity 1: PROM L hip flexion, , hip IR/ER  Manual Therapy Activity 2: STM adductor group, hip flexors, TFL, glute medius, rotators of the hip  Manual Therapy Activity 3: Inferior and lateral hip mobs with with belt assist grades 3-4      Assessment:   The focus of the session was Strengthening, ROM, Stretching, joint mobilizations, soft tissue massage, Balance, and functional training. The pt demonstrated Good tolerance to the noted exercises today. The pt is demonstrated Good progress in skilled rehab at this time. The pt is still limited in overall Strength, ROM, Flexibility, Motor control, and Pain at this time. The pt continues to be a good candidate for skilled PT, in order to further improve Strength, ROM, Flexibility, Motor control, and Pain.     Education: continue HEP    Plan:  Progress ROM, strength and functional     Goals:  Active       PT Problem       PT Goal 1       Start:  04/29/24    Expected End:  10/29/24       Patient will report <4/10 pain in R hip by 4 weeks   PROM R hip flexion 90 deg by 3-4 weeks, 110 deg by 6 weeks, abduction 20 deg by 3 weeks, 40 deg by 6 weeks, ER 20 deg by 3 weeks, and IR 30 deg by 4-6 weeks to demonstrate improved joint mechanics to perform ADLs   LTG 12-16 weeks R hip strength: flexion, abduction, ER/IR, extension, glute max  >4+ to 5/5 MMT or 80% to demonstrate pelvic stability during ADLs and higher level functional tasks   Patient able to perform SL squat without valgus or anterior hip impingement to demonstrate LE biomechanics by 6- 8 weeks   LEFS > 30/80 to demonstrate improved ability to perform ADLs by 6 weeks               Edward Cornell PT, DPT, OCS, C-OMPT, ITPT  "

## 2024-07-29 ENCOUNTER — TREATMENT (OUTPATIENT)
Dept: PHYSICAL THERAPY | Facility: HOSPITAL | Age: 24
End: 2024-07-29
Payer: COMMERCIAL

## 2024-07-29 ENCOUNTER — APPOINTMENT (OUTPATIENT)
Dept: PRIMARY CARE | Facility: CLINIC | Age: 24
End: 2024-07-29
Payer: COMMERCIAL

## 2024-07-29 DIAGNOSIS — M25.651 HIP STIFFNESS, RIGHT: ICD-10-CM

## 2024-07-29 DIAGNOSIS — R29.898 WEAKNESS OF RIGHT HIP: ICD-10-CM

## 2024-07-29 DIAGNOSIS — M25.551 RIGHT HIP PAIN: ICD-10-CM

## 2024-07-29 DIAGNOSIS — Z47.89 ORTHOPEDIC AFTERCARE: ICD-10-CM

## 2024-07-29 DIAGNOSIS — S73.191A ACETABULAR LABRUM TEAR, RIGHT, INITIAL ENCOUNTER: ICD-10-CM

## 2024-07-29 PROCEDURE — 97110 THERAPEUTIC EXERCISES: CPT | Mod: GP | Performed by: PHYSICAL THERAPIST

## 2024-07-29 PROCEDURE — 97140 MANUAL THERAPY 1/> REGIONS: CPT | Mod: GP | Performed by: PHYSICAL THERAPIST

## 2024-07-29 ASSESSMENT — PAIN - FUNCTIONAL ASSESSMENT: PAIN_FUNCTIONAL_ASSESSMENT: 0-10

## 2024-07-29 ASSESSMENT — PAIN SCALES - GENERAL: PAINLEVEL_OUTOF10: 1

## 2024-07-29 NOTE — PROGRESS NOTES
"  Physical Therapy  Physical Therapy Treatment Note    Patient Name: Jessica Perez  MRN: [id number]  Today's Date: 7/29/2024  Time Calculation  Start Time: 1600  Stop Time: 1700  Time Calculation (min): 60 min    Insurance:  Insurance:  Visit number: 25 of 49  Authorization info: no auth  Insurance Type: anthem       Current Problem  1. Acetabular labrum tear, right, initial encounter  Follow Up In Physical Therapy      2. Orthopedic aftercare  Follow Up In Physical Therapy      3. Right hip pain  Follow Up In Physical Therapy      4. Hip stiffness, right  Follow Up In Physical Therapy      5. Weakness of right hip  Follow Up In Physical Therapy          Precautions:      General  Reason for Referral: Post Op Right Hip Arthroscopy; Rim Trim; Labral reconstruction with allograft; Osteoplasty; Capsular Plication  Referred By: Dr. Errol CHAMBERS  General Comment: sx 4/25/2024 (13 weeks)    Pain  Pain Assessment: 0-10  0-10 (Numeric) Pain Score: 1    Subjective:   Patient reports she feels a little stiff today. Pt has been very busy with moving. Pt reports compliance with HEP but not to freq prescribed.      Performing HEP?: Partially      Objective:   HIP       Hip PROM  R hip flexion: (125°): 106  R hip ER: (45°): 45  R hip IR: (45°): 35        Treatment Performed:  Therapeutic Exercise  Therapeutic Exercise Activity 1: upright bike x6  Therapeutic Exercise Activity 2: hook lying bridge with band 1x15  Therapeutic Exercise Activity 3: single leg bridge 2x10 each  Therapeutic Exercise Activity 4: s/l hip abduction 3x10  Therapeutic Exercise Activity 5: prone quad stretch  Therapeutic Exercise Activity 6: clamshell red TB loop 3x12  Therapeutic Exercise Activity 7: resisted side stepping 5 yards x6 OTB loop  Therapeutic Exercise Activity 9: goblet squat to blue KB to bench+AIREX 4x8  Therapeutic Exercise Activity 10: single leg RDL to 6\" box blue KB 3x5    Manual Therapy  Manual Therapy Activity 1: PROM L hip flexion, , " hip IR/ER  Manual Therapy Activity 2: STM adductor group, hip flexors, TFL, glute medius, rotators of the hip  Manual Therapy Activity 3: Inferior and lateral hip mobs with with belt assist grades 3-4        Assessment:   The focus of the session was Strengthening, ROM, Stretching, joint mobilizations, soft tissue massage, and functional training. The pt demonstrated Good tolerance to the noted exercises today. The pt is demonstrated Good progress in skilled rehab at this time. The pt is still limited in overall Strength, ROM, Flexibility, Motor control, Balance, and Pain at this time. The pt continues to be a good candidate for skilled PT, in order to further improve Strength, ROM, Flexibility, Motor control, Balance, and Pain.     Education: continue with HEP as prescribed    Plan:  Progress to more functional activities    Goals:  Active       PT Problem       PT Goal 1       Start:  04/29/24    Expected End:  10/29/24       Patient will report <4/10 pain in R hip by 4 weeks   PROM R hip flexion 90 deg by 3-4 weeks, 110 deg by 6 weeks, abduction 20 deg by 3 weeks, 40 deg by 6 weeks, ER 20 deg by 3 weeks, and IR 30 deg by 4-6 weeks to demonstrate improved joint mechanics to perform ADLs   LTG 12-16 weeks R hip strength: flexion, abduction, ER/IR, extension, glute max  >4+ to 5/5 MMT or 80% to demonstrate pelvic stability during ADLs and higher level functional tasks   Patient able to perform SL squat without valgus or anterior hip impingement to demonstrate LE biomechanics by 6- 8 weeks   LEFS > 30/80 to demonstrate improved ability to perform ADLs by 6 weeks               Edward Cornell PT, DPT, OCS, C-OMPT, ITPT

## 2024-07-31 ENCOUNTER — OFFICE VISIT (OUTPATIENT)
Dept: ORTHOPEDIC SURGERY | Facility: HOSPITAL | Age: 24
End: 2024-07-31
Payer: COMMERCIAL

## 2024-07-31 ENCOUNTER — TREATMENT (OUTPATIENT)
Dept: PHYSICAL THERAPY | Facility: HOSPITAL | Age: 24
End: 2024-07-31
Payer: COMMERCIAL

## 2024-07-31 DIAGNOSIS — M25.551 RIGHT HIP PAIN: ICD-10-CM

## 2024-07-31 DIAGNOSIS — S73.191D TEAR OF RIGHT ACETABULAR LABRUM, SUBSEQUENT ENCOUNTER: Primary | ICD-10-CM

## 2024-07-31 DIAGNOSIS — Z47.89 ORTHOPEDIC AFTERCARE: ICD-10-CM

## 2024-07-31 DIAGNOSIS — M25.651 HIP STIFFNESS, RIGHT: ICD-10-CM

## 2024-07-31 DIAGNOSIS — R29.898 WEAKNESS OF RIGHT HIP: ICD-10-CM

## 2024-07-31 DIAGNOSIS — S73.191A ACETABULAR LABRUM TEAR, RIGHT, INITIAL ENCOUNTER: ICD-10-CM

## 2024-07-31 PROCEDURE — 99211 OFF/OP EST MAY X REQ PHY/QHP: CPT | Performed by: PHYSICIAN ASSISTANT

## 2024-07-31 PROCEDURE — 97110 THERAPEUTIC EXERCISES: CPT | Mod: GP | Performed by: PHYSICAL THERAPIST

## 2024-07-31 PROCEDURE — 97140 MANUAL THERAPY 1/> REGIONS: CPT | Mod: GP | Performed by: PHYSICAL THERAPIST

## 2024-07-31 ASSESSMENT — PAIN - FUNCTIONAL ASSESSMENT: PAIN_FUNCTIONAL_ASSESSMENT: 0-10

## 2024-07-31 ASSESSMENT — PAIN SCALES - GENERAL: PAINLEVEL_OUTOF10: 1

## 2024-07-31 NOTE — PROGRESS NOTES
Physical Therapy  Physical Therapy Treatment Note    Patient Name: Jessica Perez  MRN: [id number]  Today's Date: 7/31/2024  Time Calculation  Start Time: 1600  Stop Time: 1700  Time Calculation (min): 60 min    Insurance:  Visit number: 26 of 49  Authorization info: no auth  Insurance Type: anthem       Current Problem  1. Acetabular labrum tear, right, initial encounter  Follow Up In Physical Therapy      2. Orthopedic aftercare  Follow Up In Physical Therapy      3. Right hip pain  Follow Up In Physical Therapy      4. Hip stiffness, right  Follow Up In Physical Therapy      5. Weakness of right hip  Follow Up In Physical Therapy            General  Reason for Referral: Post Op Right Hip Arthroscopy; Rim Trim; Labral reconstruction with allograft; Osteoplasty; Capsular Plication  Referred By: Dr. Errol CHAMBERS  General Comment: sx 4/25/2024    Pain  Pain Assessment: 0-10  0-10 (Numeric) Pain Score: 1    Subjective:   Patient reports she is doing well overall. Pt had good follow up with ortho and can start light plyos. Pt states she feels a little tight today      Performing HEP?: Yes      Objective:   HIP    Functional Rating Scale     Observation     Hip Palpation/Joint Mobility      Lumbar AROM     Hip AROM     Hip PROM     Specific Lower Extremity MMT     DTR     Special Tests     Gait     Flexibility               Treatment Performed:  Therapeutic Exercise  Therapeutic Exercise Activity 1: upright bike x6  Therapeutic Exercise Activity 2: hook lying bridge with band 1x15  Therapeutic Exercise Activity 3: single leg bridge 2x10 each  Therapeutic Exercise Activity 4: s/l hip abduction 3x10  Therapeutic Exercise Activity 5: prone quad stretch  Therapeutic Exercise Activity 6: clamshell red TB loop 3x12  Therapeutic Exercise Activity 7: resisted side stepping 5 yards x6 OTB loop  Therapeutic Exercise Activity 8: duoble leg POGOs purple band 3x15  Therapeutic Exercise Activity 9: single leg POGOS purple  band    Manual Therapy  Manual Therapy Activity 1: PROM L hip flexion, , hip IR/ER  Manual Therapy Activity 2: STM adductor group, hip flexors, TFL, glute medius, rotators of the hip  Manual Therapy Activity 3: Inferior and lateral hip mobs with with belt assist grades 3-4        Assessment:   The focus of the session was Strengthening, ROM, Stretching, joint mobilizations, soft tissue massage, Gait Training, and functional training. The pt demonstrated Good tolerance to the noted exercises today. The pt is demonstrated Good progress in skilled rehab at this time. The pt is still limited in overall Strength, ROM, Flexibility, Motor control, Balance, Gait mechanics, and Pain at this time. The pt continues to be a good candidate for skilled PT, in order to further improve Strength, ROM, Flexibility, Motor control, Balance, Gait mechanics, and Pain.     Education: continue mobility work. Assess soreness after light plyos    Plan:  Progress per protocol and tolerance    Goals:  Active       PT Problem       PT Goal 1       Start:  04/29/24    Expected End:  10/29/24       Patient will report <4/10 pain in R hip by 4 weeks   PROM R hip flexion 90 deg by 3-4 weeks, 110 deg by 6 weeks, abduction 20 deg by 3 weeks, 40 deg by 6 weeks, ER 20 deg by 3 weeks, and IR 30 deg by 4-6 weeks to demonstrate improved joint mechanics to perform ADLs   LTG 12-16 weeks R hip strength: flexion, abduction, ER/IR, extension, glute max  >4+ to 5/5 MMT or 80% to demonstrate pelvic stability during ADLs and higher level functional tasks   Patient able to perform SL squat without valgus or anterior hip impingement to demonstrate LE biomechanics by 6- 8 weeks   LEFS > 30/80 to demonstrate improved ability to perform ADLs by 6 weeks               Edward Cornell PT, DPT, OCS, C-OMPT, ITPT

## 2024-07-31 NOTE — PROGRESS NOTES
Right hip 4/25/24    Patient is here today 12 weeks out from her right hip arthroscopy, labral recon.  Date of surgery 4/25/2024.  Doing well.  She is progressing physical therapy.  Occasional but overall is great range of motion.  She notes significant improvement in her preoperative symptoms.  At this point she will continue to work on strengthening and slowly prepare to take the return to sports test.  I will plan to see her back in 2 months      Yisel Lo PA-C

## 2024-08-05 ENCOUNTER — TREATMENT (OUTPATIENT)
Dept: PHYSICAL THERAPY | Facility: HOSPITAL | Age: 24
End: 2024-08-05
Payer: COMMERCIAL

## 2024-08-05 DIAGNOSIS — S73.191A ACETABULAR LABRUM TEAR, RIGHT, INITIAL ENCOUNTER: ICD-10-CM

## 2024-08-05 DIAGNOSIS — Z47.89 ORTHOPEDIC AFTERCARE: ICD-10-CM

## 2024-08-05 DIAGNOSIS — M25.551 RIGHT HIP PAIN: ICD-10-CM

## 2024-08-05 DIAGNOSIS — M25.651 HIP STIFFNESS, RIGHT: ICD-10-CM

## 2024-08-05 DIAGNOSIS — R29.898 WEAKNESS OF RIGHT HIP: ICD-10-CM

## 2024-08-05 PROCEDURE — 97110 THERAPEUTIC EXERCISES: CPT | Mod: GP | Performed by: PHYSICAL THERAPIST

## 2024-08-05 PROCEDURE — 97140 MANUAL THERAPY 1/> REGIONS: CPT | Mod: GP | Performed by: PHYSICAL THERAPIST

## 2024-08-05 ASSESSMENT — PAIN - FUNCTIONAL ASSESSMENT: PAIN_FUNCTIONAL_ASSESSMENT: 0-10

## 2024-08-05 ASSESSMENT — PAIN SCALES - GENERAL: PAINLEVEL_OUTOF10: 1

## 2024-08-05 NOTE — PROGRESS NOTES
"  Physical Therapy  Physical Therapy Treatment Note    Patient Name: Jessica Perez  MRN: [id number]  Today's Date: 8/5/2024  Time Calculation  Start Time: 1600  Stop Time: 1645  Time Calculation (min): 45 min    Insurance:  Visit number: 27 of 49  Authorization info: no auth  Insurance Type: anthem       Current Problem  1. Acetabular labrum tear, right, initial encounter  Follow Up In Physical Therapy      2. Orthopedic aftercare  Follow Up In Physical Therapy      3. Right hip pain  Follow Up In Physical Therapy      4. Hip stiffness, right  Follow Up In Physical Therapy      5. Weakness of right hip  Follow Up In Physical Therapy          General  Reason for Referral: Post Op Right Hip Arthroscopy; Rim Trim; Labral reconstruction with allograft; Osteoplasty; Capsular Plication  Referred By: Dr. Errol CHAMBERS  General Comment: sx 4/25/2024    Pain  Pain Assessment: 0-10  0-10 (Numeric) Pain Score: 1    Subjective:   Patient reports she is doing well overall. Pt states she needs to leave early today.       Performing HEP?: Yes      Objective:       Treatment Performed:  Therapeutic Exercise  Therapeutic Exercise Activity 2: hook lying bridge with band 1x15  Therapeutic Exercise Activity 3: single leg bridge 2x10 each  Therapeutic Exercise Activity 5: prone quad stretch  Therapeutic Exercise Activity 6: clamshell red TB loop 3x12  Therapeutic Exercise Activity 7: resisted 3 way step 5x5  Therapeutic Exercise Activity 8: SL RDL blue KB to 6\" box 3x8  Therapeutic Exercise Activity 9: Total gym leg press 3 cords 3x10  Therapeutic Exercise Activity 10: single leg squat at 60deg to plinth with stick support 3x8  Therapeutic Exercise Activity 11: lateral heel taps 6\" box with UE support 3x12    Manual Therapy  Manual Therapy Activity 1: PROM L hip flexion, , hip IR/ER  Manual Therapy Activity 2: STM adductor group, hip flexors, TFL, glute medius, rotators of the hip  Manual Therapy Activity 3: Inferior and lateral hip mobs " with with belt assist grades 3-4      Assessment:   The focus of the session was Strengthening, ROM, Stretching, joint mobilizations, soft tissue massage, and Balance. The pt demonstrated Good tolerance to the noted exercises today. The pt is demonstrated Good progress in skilled rehab at this time. The pt is still limited in overall Strength, ROM, Flexibility, Motor control, Balance, and Pain at this time. The pt continues to be a good candidate for skilled PT, in order to further improve Strength, ROM, Flexibility, Motor control, Balance, and Pain.     Education: continue with HEP as prescribed    Plan:  Progress ROM, strength and functional activities per protocol     Goals:  Active       PT Problem       PT Goal 1       Start:  04/29/24    Expected End:  10/29/24       Patient will report <4/10 pain in R hip by 4 weeks   PROM R hip flexion 90 deg by 3-4 weeks, 110 deg by 6 weeks, abduction 20 deg by 3 weeks, 40 deg by 6 weeks, ER 20 deg by 3 weeks, and IR 30 deg by 4-6 weeks to demonstrate improved joint mechanics to perform ADLs   LTG 12-16 weeks R hip strength: flexion, abduction, ER/IR, extension, glute max  >4+ to 5/5 MMT or 80% to demonstrate pelvic stability during ADLs and higher level functional tasks   Patient able to perform SL squat without valgus or anterior hip impingement to demonstrate LE biomechanics by 6- 8 weeks   LEFS > 30/80 to demonstrate improved ability to perform ADLs by 6 weeks               Edward Cornell PT, DPT, OCS, C-OMPT, ITPT

## 2024-08-07 ENCOUNTER — TREATMENT (OUTPATIENT)
Dept: PHYSICAL THERAPY | Facility: HOSPITAL | Age: 24
End: 2024-08-07
Payer: COMMERCIAL

## 2024-08-07 DIAGNOSIS — Z47.89 ORTHOPEDIC AFTERCARE: ICD-10-CM

## 2024-08-07 DIAGNOSIS — M25.551 RIGHT HIP PAIN: ICD-10-CM

## 2024-08-07 DIAGNOSIS — M25.651 HIP STIFFNESS, RIGHT: ICD-10-CM

## 2024-08-07 DIAGNOSIS — R29.898 WEAKNESS OF RIGHT HIP: ICD-10-CM

## 2024-08-07 DIAGNOSIS — S73.191A ACETABULAR LABRUM TEAR, RIGHT, INITIAL ENCOUNTER: ICD-10-CM

## 2024-08-07 PROCEDURE — 97110 THERAPEUTIC EXERCISES: CPT | Mod: GP | Performed by: PHYSICAL THERAPIST

## 2024-08-07 PROCEDURE — 97140 MANUAL THERAPY 1/> REGIONS: CPT | Mod: GP | Performed by: PHYSICAL THERAPIST

## 2024-08-07 ASSESSMENT — PAIN - FUNCTIONAL ASSESSMENT: PAIN_FUNCTIONAL_ASSESSMENT: 0-10

## 2024-08-07 ASSESSMENT — PAIN SCALES - GENERAL: PAINLEVEL_OUTOF10: 0 - NO PAIN

## 2024-08-07 NOTE — PROGRESS NOTES
"  Physical Therapy  Physical Therapy Treatment Note    Patient Name: Jessica Perez  MRN: [id number]  Today's Date: 8/7/2024  Time Calculation  Start Time: 1600  Stop Time: 1700  Time Calculation (min): 60 min    Insurance:  Visit number: 28 of 49  Authorization info: no auth  Insurance Type: anthem       Current Problem  1. Acetabular labrum tear, right, initial encounter  Follow Up In Physical Therapy      2. Orthopedic aftercare  Follow Up In Physical Therapy      3. Right hip pain  Follow Up In Physical Therapy      4. Hip stiffness, right  Follow Up In Physical Therapy      5. Weakness of right hip  Follow Up In Physical Therapy          Precautions:      General  Reason for Referral: Post Op Right Hip Arthroscopy; Rim Trim; Labral reconstruction with allograft; Osteoplasty; Capsular Plication  Referred By: Dr. Errol CHAMBERS  General Comment: sx 4/25/2024    Pain  Pain Assessment: 0-10  0-10 (Numeric) Pain Score: 0 - No pain    Subjective:   Patient reports she is doing well overall. Pt denies any soreness from previous session.       Performing HEP?: Yes      Objective:   HIP    Hip PROM  R hip flexion: (125°): 110  R hip ER: (45°): 40  R hip IR: (45°): 35      Treatment Performed:  Therapeutic Exercise  Therapeutic Exercise Activity 2: hook lying bridge with band 1x15  Therapeutic Exercise Activity 3: single leg bridge 2x10 each  Therapeutic Exercise Activity 4: upright bike x5'  Therapeutic Exercise Activity 5: prone quad stretch  Therapeutic Exercise Activity 6: clamshell red TB loop 3x12  Therapeutic Exercise Activity 7: resisted 3 way step 5x5  Therapeutic Exercise Activity 8: SL RDL blue KB to 6\" box 3x8  Therapeutic Exercise Activity 9: Total gym leg press 3 cords 3x10  Therapeutic Exercise Activity 10: single leg squat at 60deg to plinth with stick support 3x8  Therapeutic Exercise Activity 11: lateral heel taps 6\" box with UE support 4x15  Therapeutic Exercise Activity 12: total gym hops seat 0 no cords " 4x20    Manual Therapy  Manual Therapy Activity 1: PROM L hip flexion, , hip IR/ER  Manual Therapy Activity 2: STM adductor group, hip flexors, TFL, glute medius, rotators of the hip  Manual Therapy Activity 3: Inferior and lateral hip mobs with with belt assist grades 3-4      Assessment:   The focus of the session was Strengthening, ROM, joint mobilizations, soft tissue massage, Balance, Motor Control, Dynamic Stability Training, Gait Training, and functional training. The pt demonstrated Good tolerance to the noted exercises today by denying increase in pain and was able to increase reps. The pt is demonstrated Good progress in skilled rehab at this time. The pt is still limited in overall Strength, ROM, Flexibility, Motor control, Balance, Gait mechanics, and Pain at this time. The pt continues to be a good candidate for skilled PT, in order to further improve Strength, ROM, Flexibility, Motor control, Balance, Gait mechanics, and Pain.     Education: continue with HEP as prescribed    Plan:  Progress strength as tolerated, continue with light plyos to prepare for running, continue with MT for ROM    Goals:  Active       PT Problem       PT Goal 1       Start:  04/29/24    Expected End:  10/29/24       Patient will report <4/10 pain in R hip by 4 weeks   PROM R hip flexion 90 deg by 3-4 weeks, 110 deg by 6 weeks, abduction 20 deg by 3 weeks, 40 deg by 6 weeks, ER 20 deg by 3 weeks, and IR 30 deg by 4-6 weeks to demonstrate improved joint mechanics to perform ADLs   LTG 12-16 weeks R hip strength: flexion, abduction, ER/IR, extension, glute max  >4+ to 5/5 MMT or 80% to demonstrate pelvic stability during ADLs and higher level functional tasks   Patient able to perform SL squat without valgus or anterior hip impingement to demonstrate LE biomechanics by 6- 8 weeks   LEFS > 30/80 to demonstrate improved ability to perform ADLs by 6 weeks               Edward Cornell PT, DPT, OCS, C-OMPT, ITPT

## 2024-08-12 ENCOUNTER — APPOINTMENT (OUTPATIENT)
Dept: PHYSICAL THERAPY | Facility: HOSPITAL | Age: 24
End: 2024-08-12
Payer: COMMERCIAL

## 2024-08-12 NOTE — PROGRESS NOTES
Physical Therapy  Physical Therapy Treatment    Patient Name: Jessica Perez  MRN: [id number]  Today's Date: 8/12/2024       Insurance:  Visit number: 29 of 49  Authorization info: no auth  Insurance Type: anthem    Current Problem  No diagnosis found.    General       Pain       Subjective:   Patient reports ***    HEP Compliance: ***    Objective:   {Spine,UE,LE,HAND,LYMPHEDEMA:88917}  {General Assessments:76569}  {Functional Assessments:47228}  {Extremity/Trunk Assessments:96937}    Treatments:   {PT Treatments:59560}      Assessment: The focus of the session was {Treatment:33347}. The pt demonstrated {tolerance to rehab:05947} tolerance to the noted exercises today. The pt is demonstrated {tolerance to rehab:26808} progress in skilled rehab at this time. The pt is still limited in overall {limitations:24890} at this time. The pt continues to be a good candidate for skilled PT, in order to further improve {limitations:44176}.         Education: ***    Plan: ***       Goals:      Maxwell Cornell, PT, SCS, CSCS

## 2024-08-14 ENCOUNTER — TREATMENT (OUTPATIENT)
Dept: PHYSICAL THERAPY | Facility: HOSPITAL | Age: 24
End: 2024-08-14
Payer: COMMERCIAL

## 2024-08-14 DIAGNOSIS — M25.651 HIP STIFFNESS, RIGHT: ICD-10-CM

## 2024-08-14 DIAGNOSIS — S73.191A ACETABULAR LABRUM TEAR, RIGHT, INITIAL ENCOUNTER: ICD-10-CM

## 2024-08-14 DIAGNOSIS — M25.551 RIGHT HIP PAIN: ICD-10-CM

## 2024-08-14 DIAGNOSIS — Z47.89 ORTHOPEDIC AFTERCARE: ICD-10-CM

## 2024-08-14 DIAGNOSIS — R29.898 WEAKNESS OF RIGHT HIP: ICD-10-CM

## 2024-08-14 PROCEDURE — 97110 THERAPEUTIC EXERCISES: CPT | Mod: GP | Performed by: PHYSICAL THERAPIST

## 2024-08-14 PROCEDURE — 97140 MANUAL THERAPY 1/> REGIONS: CPT | Mod: GP | Performed by: PHYSICAL THERAPIST

## 2024-08-14 ASSESSMENT — PAIN - FUNCTIONAL ASSESSMENT: PAIN_FUNCTIONAL_ASSESSMENT: 0-10

## 2024-08-14 ASSESSMENT — PAIN SCALES - GENERAL: PAINLEVEL_OUTOF10: 0 - NO PAIN

## 2024-08-14 NOTE — PROGRESS NOTES
"Physical Therapy  Physical Therapy Treatment    Patient Name: Jessica Perez  MRN: [id number]  Today's Date: 8/14/2024  Time Calculation  Start Time: 1600  Stop Time: 1700  Time Calculation (min): 60 min    Insurance:  Visit number: 29 of 49  Authorization info: no auth  Insurance Type: anthem    Current Problem  1. Acetabular labrum tear, right, initial encounter  Follow Up In Physical Therapy      2. Orthopedic aftercare  Follow Up In Physical Therapy      3. Right hip pain  Follow Up In Physical Therapy      4. Hip stiffness, right  Follow Up In Physical Therapy      5. Weakness of right hip  Follow Up In Physical Therapy          General  Reason for Referral: Post Op Right Hip Arthroscopy; Rim Trim; Labral reconstruction with allograft; Osteoplasty; Capsular Plication  Referred By: Dr. Errol CHAMBERS    Pain  Pain Assessment: 0-10  0-10 (Numeric) Pain Score: 0 - No pain    Subjective:   Patient reports that she cancelled earlier this week due to helping her parents move and was sore as a result.     HEP Compliance: fair.     Objective:   Increase LOB with SL stance.     Treatments:   Therapeutic Exercise  Therapeutic Exercise Activity 2: hook lying bridge with band 1x15  Therapeutic Exercise Activity 3: single leg bridge 2x10 each  Therapeutic Exercise Activity 4: prone quad stretch  Therapeutic Exercise Activity 5: clamshell red TB loop 3x12  Therapeutic Exercise Activity 6: DL bridge 3x10 RTB  Therapeutic Exercise Activity 7: shuttle DL level 3 x20 hopping 0 YC  Therapeutic Exercise Activity 8: shuttle alternating x10 level 3 0 YC  Therapeutic Exercise Activity 9: shuttle SL hop x10 level 3 0 YC  Therapeutic Exercise Activity 10: step and hold 2x10  Therapeutic Exercise Activity 11: crossover step up 3x8 12\"  Therapeutic Exercise Activity 12: SL RDL 25# 3x10 to \" box    Manual Therapy  Manual Therapy Activity 1: PROM L hip flexion, , hip IR/ER  Manual Therapy Activity 2: STM adductor group, hip flexors, TFL, " glute medius, rotators of the hip  Manual Therapy Activity 3: Inferior and lateral hip mobs with with belt assist grades 3-4      Assessment: The focus of the session was Strengthening, ROM, Stretching, joint mobilizations, soft tissue massage, and functional training. The pt demonstrated Fair tolerance to the noted exercises today. The pt is demonstrated Improving progress in skilled rehab at this time. Progressed slightly in plyometrics to step and holds, along with alternating and SL hopping on shuttle. Reported a slight increase in symptoms with plyometrics to a 2/10. No pain with the strengthening work. The pt is still limited in overall Strength, Flexibility, Motor control, Balance, and Pain at this time. The pt continues to be a good candidate for skilled PT, in order to further improve Strength, Flexibility, Motor control, Balance, and Pain.         Education: Continue with noted HEP.     Plan: Continue with strengthening, slow progression in functional loading and plyometric movements.        Goals:  Active       PT Problem       PT Goal 1       Start:  04/29/24    Expected End:  10/29/24       Patient will report <4/10 pain in R hip by 4 weeks   PROM R hip flexion 90 deg by 3-4 weeks, 110 deg by 6 weeks, abduction 20 deg by 3 weeks, 40 deg by 6 weeks, ER 20 deg by 3 weeks, and IR 30 deg by 4-6 weeks to demonstrate improved joint mechanics to perform ADLs   LTG 12-16 weeks R hip strength: flexion, abduction, ER/IR, extension, glute max  >4+ to 5/5 MMT or 80% to demonstrate pelvic stability during ADLs and higher level functional tasks   Patient able to perform SL squat without valgus or anterior hip impingement to demonstrate LE biomechanics by 6- 8 weeks   LEFS > 30/80 to demonstrate improved ability to perform ADLs by 6 weeks               Maxwell Cornell, PT, SCS, CSCS

## 2024-08-19 ENCOUNTER — TREATMENT (OUTPATIENT)
Dept: PHYSICAL THERAPY | Facility: HOSPITAL | Age: 24
End: 2024-08-19
Payer: COMMERCIAL

## 2024-08-19 DIAGNOSIS — M25.551 RIGHT HIP PAIN: ICD-10-CM

## 2024-08-19 DIAGNOSIS — M25.651 HIP STIFFNESS, RIGHT: ICD-10-CM

## 2024-08-19 DIAGNOSIS — S73.191A ACETABULAR LABRUM TEAR, RIGHT, INITIAL ENCOUNTER: ICD-10-CM

## 2024-08-19 DIAGNOSIS — Z47.89 ORTHOPEDIC AFTERCARE: ICD-10-CM

## 2024-08-19 DIAGNOSIS — R29.898 WEAKNESS OF RIGHT HIP: ICD-10-CM

## 2024-08-19 PROCEDURE — 97110 THERAPEUTIC EXERCISES: CPT | Mod: GP | Performed by: PHYSICAL THERAPIST

## 2024-08-19 PROCEDURE — 97140 MANUAL THERAPY 1/> REGIONS: CPT | Mod: GP | Performed by: PHYSICAL THERAPIST

## 2024-08-20 ASSESSMENT — PAIN - FUNCTIONAL ASSESSMENT: PAIN_FUNCTIONAL_ASSESSMENT: 0-10

## 2024-08-20 ASSESSMENT — PAIN SCALES - GENERAL: PAINLEVEL_OUTOF10: 0 - NO PAIN

## 2024-08-20 NOTE — PROGRESS NOTES
"  Physical Therapy  Physical Therapy Treatment Note    Patient Name: Jessica Perez  MRN: [id number]  Today's Date: 8/19/2024  Time Calculation  Start Time: 1600  Stop Time: 1700  Time Calculation (min): 60 min    Insurance:  Visit number: 30 of 49  Authorization info: no auth  Insurance Type: anthem       Current Problem  1. Acetabular labrum tear, right, initial encounter  Follow Up In Physical Therapy      2. Orthopedic aftercare  Follow Up In Physical Therapy      3. Right hip pain  Follow Up In Physical Therapy      4. Hip stiffness, right  Follow Up In Physical Therapy      5. Weakness of right hip  Follow Up In Physical Therapy          Precautions:      General  Reason for Referral: Post Op Right Hip Arthroscopy; Rim Trim; Labral reconstruction with allograft; Osteoplasty; Capsular Plication  Referred By: Dr. Errol CHAMBERS  General Comment: sx 4/25/2024    Pain  Pain Assessment: 0-10  0-10 (Numeric) Pain Score: 0 - No pain    Subjective:   Patient reports she is doing really well today. Pt reports no soreness after last session. Pt states she has been busy moving      Performing HEP?: Yes      Objective:   HIP    Functional Rating Scale     Observation     Hip Palpation/Joint Mobility      Lumbar AROM     Hip AROM     Hip PROM  R hip flexion: (125°): 110  R hip ER: (45°): 40  R hip IR: (45°): 35  Specific Lower Extremity MMT     DTR     Special Tests     Gait     Flexibility               Treatment Performed:  Therapeutic Exercise  Therapeutic Exercise Activity 1: upright bike x6  Therapeutic Exercise Activity 2: resisted side stepping ornage TB 5 yards x6  Therapeutic Exercise Activity 3: cross over step up 12\" box blue KB 3x10 each  Therapeutic Exercise Activity 4: lateral heel tap 6\" box 3x12  Therapeutic Exercise Activity 5: total gym seat 0 DL x20  Therapeutic Exercise Activity 6: total gym SL hops x20  Therapeutic Exercise Activity 7: total gym alternating hops x20  Therapeutic Exercise Activity 8: " sports cord lateral hoping 2x20 each  Therapeutic Exercise Activity 9: step and hold 2x10    Manual Therapy  Manual Therapy Activity 1: PROM L hip flexion, , hip IR/ER  Manual Therapy Activity 2: STM adductor group, hip flexors, TFL, glute medius, rotators of the hip  Manual Therapy Activity 3: Inferior and lateral hip mobs with with belt assist grades 3-4      Assessment:   The focus of the session was Strengthening, ROM, Stretching, joint mobilizations, soft tissue massage, Motor Control, Dynamic Stability Training, and functional training. The pt demonstrated Good tolerance to the noted exercises today. The pt is demonstrated Good progress in skilled rehab at this time. The pt is still limited in overall Strength, ROM, Motor control, Balance, and Pain at this time. The pt continues to be a good candidate for skilled PT, in order to further improve Strength, ROM, Flexibility, and Pain.     Education: monitor symptoms following plyos    Plan:  Go through her lifting routine     Goals:  Active       PT Problem       PT Goal 1       Start:  04/29/24    Expected End:  10/29/24       Patient will report <4/10 pain in R hip by 4 weeks   PROM R hip flexion 90 deg by 3-4 weeks, 110 deg by 6 weeks, abduction 20 deg by 3 weeks, 40 deg by 6 weeks, ER 20 deg by 3 weeks, and IR 30 deg by 4-6 weeks to demonstrate improved joint mechanics to perform ADLs   LTG 12-16 weeks R hip strength: flexion, abduction, ER/IR, extension, glute max  >4+ to 5/5 MMT or 80% to demonstrate pelvic stability during ADLs and higher level functional tasks   Patient able to perform SL squat without valgus or anterior hip impingement to demonstrate LE biomechanics by 6- 8 weeks   LEFS > 30/80 to demonstrate improved ability to perform ADLs by 6 weeks               Edward Cornell PT, DPT, OCS, C-OMPT, ITPT

## 2024-08-21 ENCOUNTER — TREATMENT (OUTPATIENT)
Dept: PHYSICAL THERAPY | Facility: HOSPITAL | Age: 24
End: 2024-08-21
Payer: COMMERCIAL

## 2024-08-21 DIAGNOSIS — M25.551 RIGHT HIP PAIN: ICD-10-CM

## 2024-08-21 DIAGNOSIS — R29.898 WEAKNESS OF RIGHT HIP: ICD-10-CM

## 2024-08-21 DIAGNOSIS — M25.651 HIP STIFFNESS, RIGHT: ICD-10-CM

## 2024-08-21 DIAGNOSIS — Z47.89 ORTHOPEDIC AFTERCARE: ICD-10-CM

## 2024-08-21 DIAGNOSIS — S73.191A ACETABULAR LABRUM TEAR, RIGHT, INITIAL ENCOUNTER: ICD-10-CM

## 2024-08-21 PROCEDURE — 97140 MANUAL THERAPY 1/> REGIONS: CPT | Mod: GP | Performed by: PHYSICAL THERAPIST

## 2024-08-21 PROCEDURE — 97110 THERAPEUTIC EXERCISES: CPT | Mod: GP | Performed by: PHYSICAL THERAPIST

## 2024-08-23 NOTE — PROGRESS NOTES
Physical Therapy  Physical Therapy Treatment Note    Patient Name: Jessica Perez  MRN: [id number]  Today's Date: 8/21/2024  Time Calculation  Start Time: 1600  Stop Time: 1700  Time Calculation (min): 60 min    Insurance:  Visit number: 31 of 49  Authorization info: no auth  Insurance Type: anthem    Current Problem  1. Acetabular labrum tear, right, initial encounter  Follow Up In Physical Therapy      2. Orthopedic aftercare  Follow Up In Physical Therapy      3. Right hip pain  Follow Up In Physical Therapy      4. Hip stiffness, right  Follow Up In Physical Therapy      5. Weakness of right hip  Follow Up In Physical Therapy          Precautions:      General  Reason for Referral: Post Op Right Hip Arthroscopy; Rim Trim; Labral reconstruction with allograft; Osteoplasty; Capsular Plication  Referred By: Dr. Errol CHAMBERS  General Comment: sx 4/25/2024    Pain       Subjective:   Patient reports she is doing well overall. Pt would like to go over independent resistance training program      Performing HEP?: Yes      Objective:   HIP       Hip PROM  R hip flexion: (125°): 115  R hip ER: (45°): 40  R hip IR: (45°): 35      Treatment Performed:  Therapeutic Exercise  Therapeutic Exercise Activity 1: upright bike x6  Therapeutic Exercise Activity 2: resisted side stepping ornage TB 5 yards x6  Therapeutic Exercise Activity 3: barbell squats 3x10  Therapeutic Exercise Activity 4: barbell hip thrusters 3x10  Therapeutic Exercise Activity 5: hip bridge circuit  Therapeutic Exercise Activity 6: PB knees to chest  Therapeutic Exercise Activity 7: review of importance of mobility work prior to lifting  Therapeutic Exercise Activity 8: review of glute activation exercises prior to lifting  Therapeutic Exercise Activity 9: step and hold 2x10    Manual Therapy  Manual Therapy Activity 1: PROM L hip flexion, , hip IR/ER  Manual Therapy Activity 2: STM adductor group, hip flexors, TFL, glute medius, rotators of the hip  Manual  Therapy Activity 3: Inferior and lateral hip mobs with with belt assist grades 3-4      Assessment:   The focus of the session was Strengthening, ROM, Stretching, joint mobilizations, and functional training. The pt demonstrated Good tolerance to the noted exercises today. The pt is demonstrated Good progress in skilled rehab at this time. The pt is still limited in overall Strength, ROM, Flexibility, and Motor control at this time. The pt continues to be a good candidate for skilled PT, in order to further improve Strength, ROM, Flexibility, and Motor control.     Education: weight training program    Plan:  Progress per protocol     Goals:  Active       PT Problem       PT Goal 1       Start:  04/29/24    Expected End:  10/29/24       Patient will report <4/10 pain in R hip by 4 weeks   PROM R hip flexion 90 deg by 3-4 weeks, 110 deg by 6 weeks, abduction 20 deg by 3 weeks, 40 deg by 6 weeks, ER 20 deg by 3 weeks, and IR 30 deg by 4-6 weeks to demonstrate improved joint mechanics to perform ADLs   LTG 12-16 weeks R hip strength: flexion, abduction, ER/IR, extension, glute max  >4+ to 5/5 MMT or 80% to demonstrate pelvic stability during ADLs and higher level functional tasks   Patient able to perform SL squat without valgus or anterior hip impingement to demonstrate LE biomechanics by 6- 8 weeks   LEFS > 30/80 to demonstrate improved ability to perform ADLs by 6 weeks               Edward Cornell PT, DPT, OCS, C-OMPT, ITPT

## 2024-08-26 ENCOUNTER — APPOINTMENT (OUTPATIENT)
Dept: PHYSICAL THERAPY | Facility: HOSPITAL | Age: 24
End: 2024-08-26
Payer: COMMERCIAL

## 2024-08-28 ENCOUNTER — TREATMENT (OUTPATIENT)
Dept: PHYSICAL THERAPY | Facility: HOSPITAL | Age: 24
End: 2024-08-28
Payer: COMMERCIAL

## 2024-08-28 DIAGNOSIS — R29.898 WEAKNESS OF RIGHT HIP: ICD-10-CM

## 2024-08-28 DIAGNOSIS — Z47.89 ORTHOPEDIC AFTERCARE: ICD-10-CM

## 2024-08-28 DIAGNOSIS — M25.551 RIGHT HIP PAIN: ICD-10-CM

## 2024-08-28 DIAGNOSIS — M25.651 HIP STIFFNESS, RIGHT: ICD-10-CM

## 2024-08-28 DIAGNOSIS — S73.191A ACETABULAR LABRUM TEAR, RIGHT, INITIAL ENCOUNTER: ICD-10-CM

## 2024-08-28 PROCEDURE — 97140 MANUAL THERAPY 1/> REGIONS: CPT | Mod: GP | Performed by: PHYSICAL THERAPIST

## 2024-08-28 PROCEDURE — 97110 THERAPEUTIC EXERCISES: CPT | Mod: GP | Performed by: PHYSICAL THERAPIST

## 2024-08-28 ASSESSMENT — PAIN SCALES - GENERAL: PAINLEVEL_OUTOF10: 0 - NO PAIN

## 2024-08-28 ASSESSMENT — PAIN - FUNCTIONAL ASSESSMENT: PAIN_FUNCTIONAL_ASSESSMENT: 0-10

## 2024-08-28 NOTE — PROGRESS NOTES
"  Physical Therapy  Physical Therapy Treatment Note    Patient Name: Jessica Perez  MRN: [id number]  Today's Date: 8/29/2024  Time Calculation  Start Time: 1600  Stop Time: 1700  Time Calculation (min): 60 min    Insurance:  Visit number: 32 of 49  Authorization info: no auth  Insurance Type: anthem    Current Problem  1. Acetabular labrum tear, right, initial encounter  Follow Up In Physical Therapy      2. Orthopedic aftercare  Follow Up In Physical Therapy      3. Right hip pain  Follow Up In Physical Therapy      4. Hip stiffness, right  Follow Up In Physical Therapy      5. Weakness of right hip  Follow Up In Physical Therapy          Precautions:      General  Reason for Referral: Post Op Right Hip Arthroscopy; Rim Trim; Labral reconstruction with allograft; Osteoplasty; Capsular Plication  Referred By: Dr. Errol CHAMBERS  General Comment: sx 4/25/2024    Pain  Pain Assessment: 0-10  0-10 (Numeric) Pain Score: 0 - No pain    Subjective:   Patient reports she is doing really well today. Pt reports she was able to go to the gym with no issues. Pt states it felt good      Performing HEP?: Yes      Objective:   HIP     Hip PROM  R hip flexion: (125°): 115  R hip ER: (45°): 40  R hip IR: (45°): 35    Treatment Performed:  Therapeutic Exercise  Therapeutic Exercise Activity 1: eliptical x6  Therapeutic Exercise Activity 2: prone quad stretch 3x1'  Therapeutic Exercise Activity 3: hip 90/90 2x20  Therapeutic Exercise Activity 4: total gym DL hops 3x20  Therapeutic Exercise Activity 5: total gym alternating hops 3x20  Therapeutic Exercise Activity 6: total gym single leg hops 3x10 ea  Therapeutic Exercise Activity 7: sports cord lateral hops 3x20 ea  Therapeutic Exercise Activity 8: single leg RDL ball slams 15# 3x20 ea  Therapeutic Exercise Activity 9: single leg sit to stand from plinth 3x12  Therapeutic Exercise Activity 10: fwd heel taps 6\" box 2x30    Manual Therapy  Manual Therapy Activity 1: PROM L hip flexion, , " hip IR/ER  Manual Therapy Activity 2: STM adductor group, hip flexors, TFL, glute medius, rotators of the hip  Manual Therapy Activity 3: Inferior and lateral hip mobs with with belt assist grades 3-4        Assessment:   The focus of the session was Strengthening, ROM, Stretching, joint mobilizations, soft tissue massage, Motor Control, Dynamic Stability Training, and functional training. The pt demonstrated Good tolerance to the noted exercises today. Pt denied pain with light plyos and demonstrated good landing technique The pt is demonstrated Good progress in skilled rehab at this time. The pt is still limited in overall Strength, ROM, Flexibility, Motor control, Balance, and Pain at this time. The pt continues to be a good candidate for skilled PT, in order to further improve Strength, ROM, Flexibility, Motor control, Balance, and Pain.     Education:     Continue resistance training outside the clinic    Plan:  Trial of walk/jog program, continue prep for sports testing    Goals:  Active       PT Problem       PT Goal 1       Start:  04/29/24    Expected End:  10/29/24       Patient will report <4/10 pain in R hip by 4 weeks   PROM R hip flexion 90 deg by 3-4 weeks, 110 deg by 6 weeks, abduction 20 deg by 3 weeks, 40 deg by 6 weeks, ER 20 deg by 3 weeks, and IR 30 deg by 4-6 weeks to demonstrate improved joint mechanics to perform ADLs   LTG 12-16 weeks R hip strength: flexion, abduction, ER/IR, extension, glute max  >4+ to 5/5 MMT or 80% to demonstrate pelvic stability during ADLs and higher level functional tasks   Patient able to perform SL squat without valgus or anterior hip impingement to demonstrate LE biomechanics by 6- 8 weeks   LEFS > 30/80 to demonstrate improved ability to perform ADLs by 6 weeks               Edward Cornell PT, DPT, OCS, C-OMPT, ITPT

## 2024-08-29 ASSESSMENT — PAIN SCALES - GENERAL: PAINLEVEL_OUTOF10: 0 - NO PAIN

## 2024-08-29 ASSESSMENT — PAIN - FUNCTIONAL ASSESSMENT: PAIN_FUNCTIONAL_ASSESSMENT: 0-10

## 2024-09-04 ENCOUNTER — TREATMENT (OUTPATIENT)
Dept: PHYSICAL THERAPY | Facility: HOSPITAL | Age: 24
End: 2024-09-04
Payer: COMMERCIAL

## 2024-09-04 DIAGNOSIS — M25.551 RIGHT HIP PAIN: ICD-10-CM

## 2024-09-04 DIAGNOSIS — Z47.89 ORTHOPEDIC AFTERCARE: ICD-10-CM

## 2024-09-04 DIAGNOSIS — R29.898 WEAKNESS OF RIGHT HIP: ICD-10-CM

## 2024-09-04 DIAGNOSIS — M25.651 HIP STIFFNESS, RIGHT: ICD-10-CM

## 2024-09-04 DIAGNOSIS — S73.191A ACETABULAR LABRUM TEAR, RIGHT, INITIAL ENCOUNTER: ICD-10-CM

## 2024-09-04 PROCEDURE — 97140 MANUAL THERAPY 1/> REGIONS: CPT | Mod: GP | Performed by: PHYSICAL THERAPIST

## 2024-09-04 PROCEDURE — 97110 THERAPEUTIC EXERCISES: CPT | Mod: GP | Performed by: PHYSICAL THERAPIST

## 2024-09-04 ASSESSMENT — PAIN SCALES - GENERAL: PAINLEVEL_OUTOF10: 0 - NO PAIN

## 2024-09-04 ASSESSMENT — PAIN - FUNCTIONAL ASSESSMENT: PAIN_FUNCTIONAL_ASSESSMENT: 0-10

## 2024-09-04 NOTE — PROGRESS NOTES
Physical Therapy  Physical Therapy Treatment Note    Patient Name: Jessica Perez  MRN: [id number]  Today's Date: 9/4/2024  Time Calculation  Start Time: 1600  Stop Time: 1700  Time Calculation (min): 60 min    Insurance:  Visit number: 33 of 49  Authorization info: no auth  Insurance Type: anthem    Current Problem  1. Acetabular labrum tear, right, initial encounter  Follow Up In Physical Therapy      2. Orthopedic aftercare  Follow Up In Physical Therapy      3. Right hip pain  Follow Up In Physical Therapy      4. Hip stiffness, right  Follow Up In Physical Therapy      5. Weakness of right hip  Follow Up In Physical Therapy          Precautions:      General  Reason for Referral: Post Op Right Hip Arthroscopy; Rim Trim; Labral reconstruction with allograft; Osteoplasty; Capsular Plication  Referred By: Dr. Errol CHAMBERS  General Comment: sx 4/25/2024    Pain  Pain Assessment: 0-10  0-10 (Numeric) Pain Score: 0 - No pain    Subjective:   Patient reports she is doing really well overall. Pt states she had no increase pain or soreness after last visit. Pt would like to try to run today      Performing HEP?: Yes      Objective:   HIP    Hip PROM  R hip flexion: (125°): 115  R hip ER: (45°): 40  R hip IR: (45°): 35    Treatment Performed:  Therapeutic Exercise  Therapeutic Exercise Activity 1: upright bike x6'  Therapeutic Exercise Activity 2: prone quad stretch 3x1'  Therapeutic Exercise Activity 3: hip 90/90 2x20  Therapeutic Exercise Activity 4: resisted side stepping red TB loop 5 yards+5 squats x6  Therapeutic Exercise Activity 5: single leg bridge circuit red TB loop  Therapeutic Exercise Activity 6: modified side plank+clam red TB loop 2x12 each  Therapeutic Exercise Activity 7: walk/jog on treadmill 4'walk/1'jogx4 rounds 3mph/4.8 mph    Manual Therapy  Manual Therapy Activity 1: PROM L hip flexion, , hip IR/ER  Manual Therapy Activity 2: STM adductor group, hip flexors, TFL, glute medius, rotators of the  hip  Manual Therapy Activity 3: Inferior and lateral hip mobs with with belt assist grades 3-4      Assessment:   The focus of the session was Strengthening, ROM, Stretching, joint mobilizations, soft tissue massage, and functional training. The pt demonstrated Good tolerance to the noted exercises today. Pt denied pain with running and ROM is doing well.The pt is demonstrated Good progress in skilled rehab at this time. The pt is still limited in overall Strength, ROM, Flexibility, Motor control, Balance, and Pain at this time. The pt continues to be a good candidate for skilled PT, in order to further improve Strength, ROM, Flexibility, Motor control, Balance, and Pain.     Education: walk jog program    Plan:  Focus on single leg squats and sports cord hops    Goals:  Active       PT Problem       PT Goal 1       Start:  04/29/24    Expected End:  10/29/24       Patient will report <4/10 pain in R hip by 4 weeks   PROM R hip flexion 90 deg by 3-4 weeks, 110 deg by 6 weeks, abduction 20 deg by 3 weeks, 40 deg by 6 weeks, ER 20 deg by 3 weeks, and IR 30 deg by 4-6 weeks to demonstrate improved joint mechanics to perform ADLs   LTG 12-16 weeks R hip strength: flexion, abduction, ER/IR, extension, glute max  >4+ to 5/5 MMT or 80% to demonstrate pelvic stability during ADLs and higher level functional tasks   Patient able to perform SL squat without valgus or anterior hip impingement to demonstrate LE biomechanics by 6- 8 weeks   LEFS > 30/80 to demonstrate improved ability to perform ADLs by 6 weeks               Edward Cornell PT, DPT, OCS, C-OMPT, ITPT

## 2024-09-11 ENCOUNTER — TREATMENT (OUTPATIENT)
Dept: PHYSICAL THERAPY | Facility: HOSPITAL | Age: 24
End: 2024-09-11
Payer: COMMERCIAL

## 2024-09-11 DIAGNOSIS — S73.191A ACETABULAR LABRUM TEAR, RIGHT, INITIAL ENCOUNTER: ICD-10-CM

## 2024-09-11 DIAGNOSIS — M25.551 RIGHT HIP PAIN: ICD-10-CM

## 2024-09-11 DIAGNOSIS — Z47.89 ORTHOPEDIC AFTERCARE: ICD-10-CM

## 2024-09-11 DIAGNOSIS — R29.898 WEAKNESS OF RIGHT HIP: ICD-10-CM

## 2024-09-11 DIAGNOSIS — M25.651 HIP STIFFNESS, RIGHT: ICD-10-CM

## 2024-09-11 PROCEDURE — 97140 MANUAL THERAPY 1/> REGIONS: CPT | Mod: GP | Performed by: PHYSICAL THERAPIST

## 2024-09-11 PROCEDURE — 97110 THERAPEUTIC EXERCISES: CPT | Mod: GP | Performed by: PHYSICAL THERAPIST

## 2024-09-11 ASSESSMENT — PAIN SCALES - GENERAL: PAINLEVEL_OUTOF10: 0 - NO PAIN

## 2024-09-11 ASSESSMENT — PAIN - FUNCTIONAL ASSESSMENT: PAIN_FUNCTIONAL_ASSESSMENT: 0-10

## 2024-09-11 NOTE — PROGRESS NOTES
Physical Therapy  Physical Therapy Treatment Note    Patient Name: Jessica Perez  MRN: [id number]  Today's Date: 9/11/2024  Time Calculation  Start Time: 1600  Stop Time: 1700  Time Calculation (min): 60 min    Insurance:  Visit number: 34 of 49  Authorization info: no auth  Insurance Type: anthem       Current Problem  1. Acetabular labrum tear, right, initial encounter  Follow Up In Physical Therapy      2. Orthopedic aftercare  Follow Up In Physical Therapy      3. Right hip pain  Follow Up In Physical Therapy      4. Hip stiffness, right  Follow Up In Physical Therapy      5. Weakness of right hip  Follow Up In Physical Therapy          Precautions:      General  Reason for Referral: Post Op Right Hip Arthroscopy; Rim Trim; Labral reconstruction with allograft; Osteoplasty; Capsular Plication  Referred By: Dr. Errol CHAMBERS  General Comment: sx 4/25/2024    Pain  Pain Assessment: 0-10  0-10 (Numeric) Pain Score: 0 - No pain    Subjective:   Patient reports she feels a little stiff today. Pt states she was not able to perform her mobility work this morning. Pt states she was able to increase weight in her resistance training outside the clinic. Pt denies any pain or soreness after last visit      Performing HEP?: Yes      Objective:   HIP     Hip PROM  R hip flexion: (125°): 115  R hip ER: (45°): 40  R hip IR: (45°): 35        Treatment Performed:  Therapeutic Exercise  Therapeutic Exercise Activity 1: upright bike x6'  Therapeutic Exercise Activity 2: prone quad stretch 3x1'  Therapeutic Exercise Activity 3: hip 90/90 2x20  Therapeutic Exercise Activity 4: resisted side stepping red TB loop 5 yards+5 squats x6  Therapeutic Exercise Activity 5: single leg bridge circuit red TB loop  Therapeutic Exercise Activity 6: modified side plank+clam red TB loop 2x12 each  Therapeutic Exercise Activity 7: sports cord lateral hops 2x20 each  Therapeutic Exercise Activity 8: sports cord fwd diagonal 2x20 each  Therapeutic  Exercise Activity 9: sports cord backward diagonal 2x20    Manual Therapy  Manual Therapy Activity 1: PROM L hip flexion, , hip IR/ER  Manual Therapy Activity 2: STM adductor group, hip flexors, TFL, glute medius, rotators of the hip  Manual Therapy Activity 3: Inferior and lateral hip mobs with with belt assist grades 3-4        Assessment:   The focus of the session was Strengthening, ROM, Stretching, joint mobilizations, soft tissue massage, Motor Control, Dynamic Stability Training, and functional training. The pt demonstrated Good tolerance to the noted exercises today with improved ROM post session and denying pain throughout. The pt is demonstrated Good progress in skilled rehab at this time. The pt is still limited in overall Strength, ROM, Flexibility, Motor control, Balance, and Pain at this time. The pt continues to be a good candidate for skilled PT, in order to further improve Strength, ROM, Flexibility, Motor control, Balance, Gait mechanics, and Pain.     Education: walk/jog program    Plan:  Continue with sports cord activities     Goals:  Active       PT Problem       PT Goal 1       Start:  04/29/24    Expected End:  10/29/24       Patient will report <4/10 pain in R hip by 4 weeks   PROM R hip flexion 90 deg by 3-4 weeks, 110 deg by 6 weeks, abduction 20 deg by 3 weeks, 40 deg by 6 weeks, ER 20 deg by 3 weeks, and IR 30 deg by 4-6 weeks to demonstrate improved joint mechanics to perform ADLs   LTG 12-16 weeks R hip strength: flexion, abduction, ER/IR, extension, glute max  >4+ to 5/5 MMT or 80% to demonstrate pelvic stability during ADLs and higher level functional tasks   Patient able to perform SL squat without valgus or anterior hip impingement to demonstrate LE biomechanics by 6- 8 weeks   LEFS > 30/80 to demonstrate improved ability to perform ADLs by 6 weeks               Edward Cornell PT, DPT, OCS, C-OMPT, ITPT

## 2024-09-18 ENCOUNTER — TREATMENT (OUTPATIENT)
Dept: PHYSICAL THERAPY | Facility: HOSPITAL | Age: 24
End: 2024-09-18
Payer: COMMERCIAL

## 2024-09-18 DIAGNOSIS — R29.898 WEAKNESS OF RIGHT HIP: ICD-10-CM

## 2024-09-18 DIAGNOSIS — M25.651 HIP STIFFNESS, RIGHT: ICD-10-CM

## 2024-09-18 DIAGNOSIS — M25.551 RIGHT HIP PAIN: ICD-10-CM

## 2024-09-18 DIAGNOSIS — S73.191A ACETABULAR LABRUM TEAR, RIGHT, INITIAL ENCOUNTER: ICD-10-CM

## 2024-09-18 DIAGNOSIS — Z47.89 ORTHOPEDIC AFTERCARE: ICD-10-CM

## 2024-09-18 PROCEDURE — 97140 MANUAL THERAPY 1/> REGIONS: CPT | Mod: GP | Performed by: PHYSICAL THERAPIST

## 2024-09-18 PROCEDURE — 97110 THERAPEUTIC EXERCISES: CPT | Mod: GP | Performed by: PHYSICAL THERAPIST

## 2024-09-18 ASSESSMENT — PAIN SCALES - GENERAL: PAINLEVEL_OUTOF10: 1

## 2024-09-18 ASSESSMENT — PAIN - FUNCTIONAL ASSESSMENT: PAIN_FUNCTIONAL_ASSESSMENT: 0-10

## 2024-09-18 NOTE — PROGRESS NOTES
Physical Therapy  Physical Therapy Treatment Note    Patient Name: Jessica Perez  MRN: [id number]  Today's Date: 9/18/2024  Time Calculation  Start Time: 1600  Stop Time: 1700  Time Calculation (min): 60 min    Insurance:  Visit number: 35 of 49  Authorization info: no auth  Insurance Type: anthem    Current Problem  1. Acetabular labrum tear, right, initial encounter  Follow Up In Physical Therapy      2. Orthopedic aftercare  Follow Up In Physical Therapy      3. Right hip pain  Follow Up In Physical Therapy      4. Hip stiffness, right  Follow Up In Physical Therapy      5. Weakness of right hip  Follow Up In Physical Therapy          General  Reason for Referral: Post Op Right Hip Arthroscopy; Rim Trim; Labral reconstruction with allograft; Osteoplasty; Capsular Plication  Referred By: Dr. Errol CHAMBERS  General Comment: sx 4/25/2024    Pain  Pain Assessment: 0-10  0-10 (Numeric) Pain Score: 1  Pain Descriptors:  (tight)    Subjective:   Patient reports she is doing well overall. Pt feels tight today but was not able to perform her mobility work this morning. Pt would like to focus more exercises she would to at the gym.      Performing HEP?: Yes      Objective:     Treatment Performed:  Therapeutic Exercise  Therapeutic Exercise Activity 1: upright bike x6'  Therapeutic Exercise Activity 2: prone quad stretch 3x1'  Therapeutic Exercise Activity 3: hip 90/90 2x20  Therapeutic Exercise Activity 4: resisted side stepping red TB loop 5 yards+5 squats x6  Therapeutic Exercise Activity 5: single leg bridge circuit red TB loop  Therapeutic Exercise Activity 6: bol[place]ian split squats 4x8 each  Therapeutic Exercise Activity 7: review of retro lunge  Therapeutic Exercise Activity 8: discussed progressing weight at the gym and increasing walk/jog program    Manual Therapy  Manual Therapy Activity 1: PROM L hip flexion, , hip IR/ER  Manual Therapy Activity 2: STM adductor group, hip flexors, TFL, glute medius, rotators of  the hip  Manual Therapy Activity 3: Inferior and lateral hip mobs with with belt assist grades 3-4        Assessment:   The focus of the session was Strengthening, ROM, Stretching, joint mobilizations, soft tissue massage, and functional training. The pt demonstrated Good tolerance to the noted exercises today. The pt is demonstrated Good progress in skilled rehab at this time. The pt is still limited in overall Strength, ROM, Flexibility, Motor control, Balance, and Pain at this time. The pt continues to be a good candidate for skilled PT, in order to further improve Strength, ROM, Flexibility, Motor control, Balance, and Pain.     Education: progression of weight training    Plan:  Practice hop testing and slide board activities    Goals:  Active       PT Problem       PT Goal 1       Start:  04/29/24    Expected End:  10/29/24       Patient will report <4/10 pain in R hip by 4 weeks   PROM R hip flexion 90 deg by 3-4 weeks, 110 deg by 6 weeks, abduction 20 deg by 3 weeks, 40 deg by 6 weeks, ER 20 deg by 3 weeks, and IR 30 deg by 4-6 weeks to demonstrate improved joint mechanics to perform ADLs   LTG 12-16 weeks R hip strength: flexion, abduction, ER/IR, extension, glute max  >4+ to 5/5 MMT or 80% to demonstrate pelvic stability during ADLs and higher level functional tasks   Patient able to perform SL squat without valgus or anterior hip impingement to demonstrate LE biomechanics by 6- 8 weeks   LEFS > 30/80 to demonstrate improved ability to perform ADLs by 6 weeks               Edward Cornell PT, DPT, OCS, C-OMPT, ITPT

## 2024-09-25 ENCOUNTER — TREATMENT (OUTPATIENT)
Dept: PHYSICAL THERAPY | Facility: HOSPITAL | Age: 24
End: 2024-09-25
Payer: COMMERCIAL

## 2024-09-25 DIAGNOSIS — Z47.89 ORTHOPEDIC AFTERCARE: ICD-10-CM

## 2024-09-25 DIAGNOSIS — S73.191A ACETABULAR LABRUM TEAR, RIGHT, INITIAL ENCOUNTER: ICD-10-CM

## 2024-09-25 DIAGNOSIS — M25.551 RIGHT HIP PAIN: ICD-10-CM

## 2024-09-25 DIAGNOSIS — R29.898 WEAKNESS OF RIGHT HIP: ICD-10-CM

## 2024-09-25 DIAGNOSIS — M25.651 HIP STIFFNESS, RIGHT: ICD-10-CM

## 2024-09-25 PROCEDURE — 97110 THERAPEUTIC EXERCISES: CPT | Mod: GP | Performed by: PHYSICAL THERAPIST

## 2024-09-25 PROCEDURE — 97140 MANUAL THERAPY 1/> REGIONS: CPT | Mod: GP | Performed by: PHYSICAL THERAPIST

## 2024-09-26 ASSESSMENT — PAIN SCALES - GENERAL: PAINLEVEL_OUTOF10: 0 - NO PAIN

## 2024-09-26 ASSESSMENT — PAIN - FUNCTIONAL ASSESSMENT: PAIN_FUNCTIONAL_ASSESSMENT: 0-10

## 2024-09-26 NOTE — PROGRESS NOTES
Physical Therapy  Physical Therapy Treatment Note    Patient Name: Jessica Perez  MRN: [id number]  Today's Date: 9/25/2024  Time Calculation  Start Time: 1600  Stop Time: 1645  Time Calculation (min): 45 min    Insurance:  Visit number: 36 of 49  Authorization info: no auth  Insurance Type: anthem    Current Problem  1. Acetabular labrum tear, right, initial encounter  Follow Up In Physical Therapy      2. Orthopedic aftercare  Follow Up In Physical Therapy      3. Right hip pain  Follow Up In Physical Therapy      4. Hip stiffness, right  Follow Up In Physical Therapy      5. Weakness of right hip  Follow Up In Physical Therapy          Precautions:      General  Reason for Referral: Post Op Right Hip Arthroscopy; Rim Trim; Labral reconstruction with allograft; Osteoplasty; Capsular Plication  Referred By: Dr. Errol CHAMBERS  General Comment: sx 4/25/2024      Pain  Pain Assessment: 0-10  0-10 (Numeric) Pain Score: 0 - No pain    Subjective:   Patient reports she is doing really well overall. Pt would like do slide board activities today      Performing HEP?: Yes      Objective:       Treatment Performed:  Therapeutic Exercise  Therapeutic Exercise Activity 1: upright bike x6'  Therapeutic Exercise Activity 2: prone quad stretch 3x1'  Therapeutic Exercise Activity 3: hip 90/90 2x20  Therapeutic Exercise Activity 4: resisted side stepping red TB loop 5 yards+5 squats x6  Therapeutic Exercise Activity 5: single leg bridge circuit red TB loop  Therapeutic Exercise Activity 6: slide board skaters 3x20  Therapeutic Exercise Activity 7: slide board lateral lunge 3x12 each    Manual Therapy  Manual Therapy Activity 1: PROM L hip flexion, , hip IR/ER  Manual Therapy Activity 2: STM adductor group, hip flexors, TFL, glute medius, rotators of the hip  Manual Therapy Activity 3: Inferior and lateral hip mobs with with belt assist grades 3-4      Assessment:   The focus of the session was Strengthening, ROM, Stretching, joint  mobilizations, soft tissue massage, and functional training. The pt demonstrated Good tolerance to the noted exercises today. The pt is demonstrated Good progress in skilled rehab at this time. The pt is still limited in overall Strength, ROM, Flexibility, Motor control, and Balance at this time. The pt continues to be a good candidate for skilled PT, in order to further improve Strength, ROM, Flexibility, Motor control, and Balance.     Education: light skating     Plan:  Continue with functional training and hop testing    Goals:  Active       PT Problem       PT Goal 1       Start:  04/29/24    Expected End:  10/29/24       Patient will report <4/10 pain in R hip by 4 weeks   PROM R hip flexion 90 deg by 3-4 weeks, 110 deg by 6 weeks, abduction 20 deg by 3 weeks, 40 deg by 6 weeks, ER 20 deg by 3 weeks, and IR 30 deg by 4-6 weeks to demonstrate improved joint mechanics to perform ADLs   LTG 12-16 weeks R hip strength: flexion, abduction, ER/IR, extension, glute max  >4+ to 5/5 MMT or 80% to demonstrate pelvic stability during ADLs and higher level functional tasks   Patient able to perform SL squat without valgus or anterior hip impingement to demonstrate LE biomechanics by 6- 8 weeks   LEFS > 30/80 to demonstrate improved ability to perform ADLs by 6 weeks               Edward Cornell PT, DPT, OCS, C-OMPT, ITPT

## 2024-10-02 ENCOUNTER — HOSPITAL ENCOUNTER (OUTPATIENT)
Dept: RADIOLOGY | Facility: HOSPITAL | Age: 24
Discharge: HOME | End: 2024-10-02
Payer: COMMERCIAL

## 2024-10-02 ENCOUNTER — TREATMENT (OUTPATIENT)
Dept: PHYSICAL THERAPY | Facility: HOSPITAL | Age: 24
End: 2024-10-02
Payer: COMMERCIAL

## 2024-10-02 ENCOUNTER — APPOINTMENT (OUTPATIENT)
Dept: ORTHOPEDIC SURGERY | Facility: HOSPITAL | Age: 24
End: 2024-10-02
Payer: COMMERCIAL

## 2024-10-02 ENCOUNTER — OFFICE VISIT (OUTPATIENT)
Dept: ORTHOPEDIC SURGERY | Facility: HOSPITAL | Age: 24
End: 2024-10-02
Payer: COMMERCIAL

## 2024-10-02 DIAGNOSIS — S73.191D TEAR OF RIGHT ACETABULAR LABRUM, SUBSEQUENT ENCOUNTER: ICD-10-CM

## 2024-10-02 DIAGNOSIS — R29.898 WEAKNESS OF RIGHT HIP: ICD-10-CM

## 2024-10-02 DIAGNOSIS — M25.552 LEFT HIP PAIN: ICD-10-CM

## 2024-10-02 DIAGNOSIS — S73.191A ACETABULAR LABRUM TEAR, RIGHT, INITIAL ENCOUNTER: ICD-10-CM

## 2024-10-02 DIAGNOSIS — M25.551 RIGHT HIP PAIN: ICD-10-CM

## 2024-10-02 DIAGNOSIS — M25.852 FEMOROACETABULAR IMPINGEMENT OF LEFT HIP: Primary | ICD-10-CM

## 2024-10-02 DIAGNOSIS — M25.651 HIP STIFFNESS, RIGHT: ICD-10-CM

## 2024-10-02 DIAGNOSIS — Z47.89 ORTHOPEDIC AFTERCARE: ICD-10-CM

## 2024-10-02 PROCEDURE — 99214 OFFICE O/P EST MOD 30 MIN: CPT | Performed by: SPECIALIST/TECHNOLOGIST

## 2024-10-02 PROCEDURE — 97140 MANUAL THERAPY 1/> REGIONS: CPT | Mod: GP | Performed by: PHYSICAL THERAPIST

## 2024-10-02 PROCEDURE — 1036F TOBACCO NON-USER: CPT | Performed by: SPECIALIST/TECHNOLOGIST

## 2024-10-02 PROCEDURE — 73502 X-RAY EXAM HIP UNI 2-3 VIEWS: CPT | Mod: LT

## 2024-10-02 PROCEDURE — 97110 THERAPEUTIC EXERCISES: CPT | Mod: GP | Performed by: PHYSICAL THERAPIST

## 2024-10-02 ASSESSMENT — PAIN SCALES - GENERAL: PAINLEVEL_OUTOF10: 0 - NO PAIN

## 2024-10-02 ASSESSMENT — PAIN - FUNCTIONAL ASSESSMENT: PAIN_FUNCTIONAL_ASSESSMENT: 0-10

## 2024-10-02 NOTE — PROGRESS NOTES
Physical Therapy  Physical Therapy Treatment Note    Patient Name: Jessica Perez  MRN: [id number]  Today's Date: 10/2/2024  Time Calculation  Start Time: 1130  Stop Time: 1230  Time Calculation (min): 60 min    Insurance:  Visit number: 37 of 49  Authorization info: no auth  Insurance Type: anthem    Current Problem  1. Acetabular labrum tear, right, initial encounter  Follow Up In Physical Therapy      2. Orthopedic aftercare  Follow Up In Physical Therapy      3. Right hip pain  Follow Up In Physical Therapy      4. Hip stiffness, right  Follow Up In Physical Therapy      5. Weakness of right hip  Follow Up In Physical Therapy          Precautions:      General  Reason for Referral: Post Op Right Hip Arthroscopy; Rim Trim; Labral reconstruction with allograft; Osteoplasty; Capsular Plication  Referred By: Dr. Errol CHAMBERS  General Comment: sx 4/25/2024        Pain  Pain Assessment: 0-10  0-10 (Numeric) Pain Score: 0 - No pain    Subjective:   Patient reports she is doing really well. Pt has been running and lifting with no issues. Pt sees Dontae Quiles today      Performing HEP?: Yes      Objective:   HIP    Hip PROM  R hip flexion: (125°): 115  R hip ER: (45°): 40  R hip IR: (45°): 35          Treatment Performed:  Therapeutic Exercise  Therapeutic Exercise Activity 1: upright bike x6'  Therapeutic Exercise Activity 2: prone quad stretch 3x1'  Therapeutic Exercise Activity 3: hip 90/90 2x20  Therapeutic Exercise Activity 4: resisted side stepping red TB loop 5 yards+5 squats x6  Therapeutic Exercise Activity 5: single leg bridge circuit red TB loop  Therapeutic Exercise Activity 6: prone resisted hip IR red TB loop 2x15  Therapeutic Exercise Activity 7: seated hip IR red TB loop 2x15 with yoga block  Therapeutic Exercise Activity 8: total gym    Manual Therapy  Manual Therapy Activity 1: PROM L hip flexion, , hip IR/ER  Manual Therapy Activity 2: STM adductor group, hip flexors, TFL, glute medius, rotators of the  hip  Manual Therapy Activity 3: Inferior and lateral hip mobs with with belt assist grades 3-4        Assessment:   The focus of the session was Strengthening, ROM, Stretching, joint mobilizations, and soft tissue massage. The pt demonstrated Good tolerance to the noted exercises today. The pt is demonstrated Good progress in skilled rehab at this time. The pt is still limited in overall Strength, ROM, and Motor control at this time. The pt continues to be a good candidate for skilled PT, in order to further improve Strength, ROM, and Pain.     Education: progress to skating    Plan:  RTS battery with likely discharge     Goals:  Active       PT Problem       PT Goal 1       Start:  04/29/24    Expected End:  10/29/24       Patient will report <4/10 pain in R hip by 4 weeks   PROM R hip flexion 90 deg by 3-4 weeks, 110 deg by 6 weeks, abduction 20 deg by 3 weeks, 40 deg by 6 weeks, ER 20 deg by 3 weeks, and IR 30 deg by 4-6 weeks to demonstrate improved joint mechanics to perform ADLs   LTG 12-16 weeks R hip strength: flexion, abduction, ER/IR, extension, glute max  >4+ to 5/5 MMT or 80% to demonstrate pelvic stability during ADLs and higher level functional tasks   Patient able to perform SL squat without valgus or anterior hip impingement to demonstrate LE biomechanics by 6- 8 weeks   LEFS > 30/80 to demonstrate improved ability to perform ADLs by 6 weeks               Edward Cornell PT, DPT, OCS, C-OMPT, ITPT

## 2024-10-03 NOTE — PROGRESS NOTES
HPI  This is a pleasant 23 y.o. female here today for right hip arthroscopy on 4/25/2024. Overall, she is feeling well.  She denies adverse or issues since her last follow up visit.  She has continued with formal physical therapy and says she passed the return to sport test without issues.  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  This will be her first time skating since time of injury.  She has been doing a walk/jog program without difficulty. We discussed easing into her skating routine and to continue building her strength and stability.      Today, she reports increased left  hip pain.  She reports the pain has been presents for over 1 year, however her right hip was giving her more trouble.  She reports the pain as a grinding over the anterior hip that worsens following activities, sitting.  She has tried oral anti-inflammatories, activity modifications and formal physical therapy with some improvement of her symptoms but her pain persists.  She denies numbness or tingling into the left lower extremity. She presents for treatment recommendations.        Past Medical History:   Diagnosis Date    Allergic 2008    Asthma ([facility] [place] 2001    Eczema 2012       Past Surgical History:   Procedure Laterality Date    WISDOM TOOTH EXTRACTION  6/2019       Social History     Tobacco Use    Smoking status: Never    Smokeless tobacco: Never   Vaping Use    Vaping status: Every Day   Substance Use Topics    Alcohol use: Yes     Alcohol/week: 5.0 standard drinks of alcohol     Types: 5 Standard drinks or equivalent per week    Drug use: Never          ROS  Review of systems reviewed and pertinent positives mentioned in HPI.      PHYSICAL EXAM  There is not  pain with a resisted situp.    There is not abdominal distention or tenderness    The patient's range of motion reveals that they have 90° of hip flexion on the affected side.   ER 60 degrees.   IR 40 degrees  Hip  extension to 10°   Abduction to 45°      The patient is 5 out of 5 strength with resisted hip AB, adduction, hamstring and quadriceps testing.    No pain over the hip flexor, ASIS.  No pain over the proximal hamstring, piriformis      Pain Provacation testing:    Positive impingement sign,with a painful arc from 12 to 3:00.  Negative Psoas impingement/Kameron test  Negative instability, Log roll.  Positive subspine impingement test, which is pain with straight hip flexion.    Negative straight leg raise.  Negative circumduction clunk.      Peritrochanteric space examination:  Tenderness over the lon-trochanteric space - Yes  pain over the posterior trochanter - No      IMAGING  X-rays reviewed reveal no gross fracture or dislocation., evidence of femoral acetabular impingement with an alpha angle of 78.8.  Acetabular index of 3.2  without acetabular retroversion.  Lateral center edge of 33.7., and mild degenerative changes noted.    MRI reviewed reveals No MRI available for review      ASSESSMENT/PLAN  This is a 23 y.o. female patient here today with significant hip pain secondary to Left femoral acetabular impingement  The patient and I discussed her clinical presentation and physical exam findings consistent with left hip femoral acetabular impingement.  We discussed her conservative and surgical treatment options.  Similarly to her right side, this left side has similar radiographic findings consistent with femoral acetabular impingement.  She feels that the pain is the same as it was on her right hip preoperatively.  She has tried oral anti-inflammatory medications, activity modifications and dedicated physical therapy to the bilateral hips for since March 2024 that has failed to provide her adequate relief of her symptoms on her left side.      Therefore, we agreed upon an MRI for MRI as patient has radiographic evidence of femoral acetabular impingement as mentioned above.   Positive impingement sign and  painful rotation on physical exam.  Limited hip flexion due to pain.  Pain levels now affecting activities of daily living.  Due to the fact they have significant pain that has persisted despite NSAIDs, activity modification and therapeutic exercises over the past 6 months, we are going to obtain an MRI for evaluation of the acetabular labrum to assist with surgical planning. Follow up once imaging is complete.  She is in agreement the plan.      As far as the right hip is concerned, she has done quite well throughout the postoperative rehabilitation protocol.  She denies pain today.  She has returned to all of her activities and will ultimately begin ice-skating this Sunday.  We discussed gradual progression of returning to ice-skating as she is coaching girls hockey.  I encouraged her to continue with her home exercise regimen, stretching program and icing her hip following her activities.  She can take over-the-counter ibuprofen and Tylenol if her pain persists.  She is in agreement the plan questions are answered.

## 2024-10-16 ENCOUNTER — APPOINTMENT (OUTPATIENT)
Dept: PHYSICAL THERAPY | Facility: HOSPITAL | Age: 24
End: 2024-10-16
Payer: COMMERCIAL

## 2024-10-20 ENCOUNTER — HOSPITAL ENCOUNTER (OUTPATIENT)
Dept: RADIOLOGY | Facility: HOSPITAL | Age: 24
Discharge: HOME | End: 2024-10-20
Payer: COMMERCIAL

## 2024-10-20 DIAGNOSIS — M25.852 FEMOROACETABULAR IMPINGEMENT OF LEFT HIP: ICD-10-CM

## 2024-10-20 PROCEDURE — 73721 MRI JNT OF LWR EXTRE W/O DYE: CPT | Mod: LT

## 2024-10-23 ENCOUNTER — OFFICE VISIT (OUTPATIENT)
Dept: ORTHOPEDIC SURGERY | Facility: HOSPITAL | Age: 24
End: 2024-10-23
Payer: COMMERCIAL

## 2024-10-23 VITALS — WEIGHT: 225 LBS | HEIGHT: 69 IN | BODY MASS INDEX: 33.33 KG/M2

## 2024-10-23 DIAGNOSIS — S73.192A ACETABULAR LABRUM TEAR, LEFT, INITIAL ENCOUNTER: Primary | ICD-10-CM

## 2024-10-23 PROCEDURE — 3008F BODY MASS INDEX DOCD: CPT | Performed by: ORTHOPAEDIC SURGERY

## 2024-10-23 PROCEDURE — 99213 OFFICE O/P EST LOW 20 MIN: CPT | Performed by: ORTHOPAEDIC SURGERY

## 2024-10-23 PROCEDURE — L1686 HO POST-OP HIP ABDUCTION: HCPCS | Performed by: ORTHOPAEDIC SURGERY

## 2024-10-24 PROBLEM — M24.052 LOOSE BODY IN LEFT HIP: Status: ACTIVE | Noted: 2024-10-23

## 2024-10-24 PROBLEM — S73.192A TEAR OF LEFT ACETABULAR LABRUM: Status: ACTIVE | Noted: 2024-10-23

## 2024-10-24 PROBLEM — M25.852 FEMOROACETABULAR IMPINGEMENT OF LEFT HIP: Status: ACTIVE | Noted: 2024-10-23

## 2024-10-30 ENCOUNTER — APPOINTMENT (OUTPATIENT)
Dept: PHYSICAL THERAPY | Facility: HOSPITAL | Age: 24
End: 2024-10-30
Payer: COMMERCIAL

## 2024-11-06 ENCOUNTER — OFFICE VISIT (OUTPATIENT)
Dept: OBSTETRICS AND GYNECOLOGY | Facility: HOSPITAL | Age: 24
End: 2024-11-06
Payer: COMMERCIAL

## 2024-11-06 VITALS
WEIGHT: 234 LBS | DIASTOLIC BLOOD PRESSURE: 86 MMHG | BODY MASS INDEX: 34.66 KG/M2 | HEIGHT: 69 IN | SYSTOLIC BLOOD PRESSURE: 135 MMHG

## 2024-11-06 DIAGNOSIS — Z30.44 ENCOUNTER FOR SURVEILLANCE OF VAGINAL RING HORMONAL CONTRACEPTIVE DEVICE: ICD-10-CM

## 2024-11-06 DIAGNOSIS — Z01.419 WELL WOMAN EXAM WITH ROUTINE GYNECOLOGICAL EXAM: Primary | ICD-10-CM

## 2024-11-06 PROCEDURE — 1036F TOBACCO NON-USER: CPT | Performed by: NURSE PRACTITIONER

## 2024-11-06 PROCEDURE — 99395 PREV VISIT EST AGE 18-39: CPT | Performed by: NURSE PRACTITIONER

## 2024-11-06 PROCEDURE — 3008F BODY MASS INDEX DOCD: CPT | Performed by: NURSE PRACTITIONER

## 2024-11-06 RX ORDER — ETONOGESTREL AND ETHINYL ESTRADIOL VAGINAL RING .015; .12 MG/D; MG/D
1 RING VAGINAL
Qty: 3 EACH | Refills: 3 | Status: SHIPPED | OUTPATIENT
Start: 2024-11-06 | End: 2025-11-06

## 2024-11-06 SDOH — ECONOMIC STABILITY: FOOD INSECURITY: WITHIN THE PAST 12 MONTHS, YOU WORRIED THAT YOUR FOOD WOULD RUN OUT BEFORE YOU GOT MONEY TO BUY MORE.: NEVER TRUE

## 2024-11-06 SDOH — ECONOMIC STABILITY: FOOD INSECURITY: WITHIN THE PAST 12 MONTHS, THE FOOD YOU BOUGHT JUST DIDN'T LAST AND YOU DIDN'T HAVE MONEY TO GET MORE.: NEVER TRUE

## 2024-11-06 ASSESSMENT — PATIENT HEALTH QUESTIONNAIRE - PHQ9
1. LITTLE INTEREST OR PLEASURE IN DOING THINGS: NOT AT ALL
2. FEELING DOWN, DEPRESSED OR HOPELESS: NOT AT ALL
SUM OF ALL RESPONSES TO PHQ9 QUESTIONS 1 & 2: 0

## 2024-11-06 ASSESSMENT — PAIN SCALES - GENERAL: PAINLEVEL_OUTOF10: 0-NO PAIN

## 2024-11-06 ASSESSMENT — ENCOUNTER SYMPTOMS: UNEXPECTED WEIGHT CHANGE: 1

## 2024-11-06 NOTE — PROGRESS NOTES
"Aurora Melara, APRN-CNP     Subjective   Jessica Perez is a 24 y.o. female who presents for annual exam.   24-year-old  here today for her annual checkup and renewal of birth control vaginal ring.  Patient denies any health changes in the past year.    She may be having hip surgery on her second hip.    She is unhappy with weight gain and difficulty losing weight.  She would like a referral and we will refer her to nutrition counseling.  She is limited on her ability to exercise due to her hip restrictions and impending surgery    She has found a job working at [employer] and is considering going back to school for law school.    Questioning safety of birth control that she has heard from friends that birth control causes cancer.  Safety of contraception reviewed especially in patients who are healthy without medical conditions.    Past Medical History:   Diagnosis Date    Allergic     Eczema      Past Surgical History:   Procedure Laterality Date    HIP SURGERY  2024    WISDOM TOOTH EXTRACTION  2019       OB History          0    Para   0    Term   0       0    AB   0    Living   0         SAB   0    IAB   0    Ectopic   0    Multiple   0    Live Births   0               Patient's last menstrual period was 10/18/2024 (approximate).      Review of Systems   Constitutional:  Positive for unexpected weight change.   All other systems reviewed and are negative.    Breast: No Complaints   Vaginal: No Complaints        Objective   /86   Ht 1.753 m (5' 9\")   Wt 106 kg (234 lb)   LMP 10/18/2024 (Approximate) Comment: nuva ring birth control  BMI 34.56 kg/m²   Physical Exam  Constitutional:       Appearance: Normal appearance. She is normal weight.   Genitourinary:      Vulva and rectum normal.      Genitourinary Comments: Bilateral fibrocystic changes      Right Labia: No rash.     Left Labia: No rash.     No vaginal discharge.      No vaginal prolapse present.     No " vaginal atrophy present.       Right Adnexa: no mass present.     Left Adnexa: no mass present.     Cervix is nulliparous.      No cervical motion tenderness.   Breasts:     Right: Normal.      Left: Normal.   Cardiovascular:      Rate and Rhythm: Normal rate.      Heart sounds: Normal heart sounds.   Pulmonary:      Effort: Pulmonary effort is normal.      Breath sounds: Normal breath sounds.   Abdominal:      Palpations: Abdomen is soft.   Musculoskeletal:         General: Normal range of motion.      Cervical back: Normal range of motion.   Neurological:      General: No focal deficit present.      Mental Status: She is alert.   Skin:     General: Skin is warm and dry.   Vitals and nursing note reviewed.                   Assessment/Plan   Problem List Items Addressed This Visit    None  Visit Diagnoses         Codes    Well woman exam with routine gynecological exam    -  Primary Z01.419    Encounter for surveillance of vaginal ring hormonal contraceptive device     Z30.44    Relevant Medications    etonogestreL-ethinyl estradioL (Nuvaring) 0.12-0.015 mg/24 hr vaginal ring    Other Relevant Orders    THINPREP PAP TEST (21-24)    BMI 34.0-34.9,adult     Z68.34    Relevant Orders    Referral to Nutrition Services

## 2024-11-11 LAB
CYTOLOGY CMNT CVX/VAG CYTO-IMP: NORMAL
LAB AP CONTRACEPTIVE HISTORY: NORMAL
LAB AP HPV HR: NORMAL
LABORATORY COMMENT REPORT: NORMAL
LMP START DATE: NORMAL
PATH REPORT.TOTAL CANCER: NORMAL

## 2024-12-02 ENCOUNTER — LAB (OUTPATIENT)
Dept: LAB | Facility: LAB | Age: 24
End: 2024-12-02
Payer: COMMERCIAL

## 2024-12-02 DIAGNOSIS — S73.192A ACETABULAR LABRUM TEAR, LEFT, INITIAL ENCOUNTER: ICD-10-CM

## 2024-12-02 LAB
B-HCG SERPL-ACNC: <3 MIU/ML
BASOPHILS # BLD AUTO: 0.11 X10*3/UL (ref 0–0.1)
BASOPHILS NFR BLD AUTO: 1 %
EOSINOPHIL # BLD AUTO: 0.29 X10*3/UL (ref 0–0.7)
EOSINOPHIL NFR BLD AUTO: 2.6 %
ERYTHROCYTE [DISTWIDTH] IN BLOOD BY AUTOMATED COUNT: 11.8 % (ref 11.5–14.5)
HCT VFR BLD AUTO: 39.7 % (ref 36–46)
HGB BLD-MCNC: 13.5 G/DL (ref 12–16)
IMM GRANULOCYTES # BLD AUTO: 0.03 X10*3/UL (ref 0–0.7)
IMM GRANULOCYTES NFR BLD AUTO: 0.3 % (ref 0–0.9)
LYMPHOCYTES # BLD AUTO: 3.33 X10*3/UL (ref 1.2–4.8)
LYMPHOCYTES NFR BLD AUTO: 30.3 %
MCH RBC QN AUTO: 30.2 PG (ref 26–34)
MCHC RBC AUTO-ENTMCNC: 34 G/DL (ref 32–36)
MCV RBC AUTO: 89 FL (ref 80–100)
MONOCYTES # BLD AUTO: 0.88 X10*3/UL (ref 0.1–1)
MONOCYTES NFR BLD AUTO: 8 %
NEUTROPHILS # BLD AUTO: 6.34 X10*3/UL (ref 1.2–7.7)
NEUTROPHILS NFR BLD AUTO: 57.8 %
NRBC BLD-RTO: 0 /100 WBCS (ref 0–0)
PLATELET # BLD AUTO: 380 X10*3/UL (ref 150–450)
RBC # BLD AUTO: 4.47 X10*6/UL (ref 4–5.2)
WBC # BLD AUTO: 11 X10*3/UL (ref 4.4–11.3)

## 2024-12-02 PROCEDURE — 36415 COLL VENOUS BLD VENIPUNCTURE: CPT

## 2024-12-02 PROCEDURE — 85025 COMPLETE CBC W/AUTO DIFF WBC: CPT

## 2024-12-02 PROCEDURE — 80053 COMPREHEN METABOLIC PANEL: CPT

## 2024-12-02 PROCEDURE — 84702 CHORIONIC GONADOTROPIN TEST: CPT

## 2024-12-03 LAB
ALBUMIN SERPL BCP-MCNC: 4.3 G/DL (ref 3.4–5)
ALP SERPL-CCNC: 64 U/L (ref 33–110)
ALT SERPL W P-5'-P-CCNC: 26 U/L (ref 7–45)
ANION GAP SERPL CALC-SCNC: 13 MMOL/L (ref 10–20)
AST SERPL W P-5'-P-CCNC: 19 U/L (ref 9–39)
BILIRUB SERPL-MCNC: 0.5 MG/DL (ref 0–1.2)
BUN SERPL-MCNC: 10 MG/DL (ref 6–23)
CALCIUM SERPL-MCNC: 9.6 MG/DL (ref 8.6–10.6)
CHLORIDE SERPL-SCNC: 103 MMOL/L (ref 98–107)
CO2 SERPL-SCNC: 26 MMOL/L (ref 21–32)
CREAT SERPL-MCNC: 0.62 MG/DL (ref 0.5–1.05)
EGFRCR SERPLBLD CKD-EPI 2021: >90 ML/MIN/1.73M*2
GLUCOSE SERPL-MCNC: 85 MG/DL (ref 74–99)
POTASSIUM SERPL-SCNC: 4.2 MMOL/L (ref 3.5–5.3)
PROT SERPL-MCNC: 7.3 G/DL (ref 6.4–8.2)
SODIUM SERPL-SCNC: 138 MMOL/L (ref 136–145)

## 2024-12-19 ENCOUNTER — ANESTHESIA EVENT (OUTPATIENT)
Dept: OPERATING ROOM | Facility: HOSPITAL | Age: 24
End: 2024-12-19
Payer: COMMERCIAL

## 2024-12-19 ENCOUNTER — HOSPITAL ENCOUNTER (OUTPATIENT)
Facility: HOSPITAL | Age: 24
Setting detail: OUTPATIENT SURGERY
Discharge: HOME | End: 2024-12-19
Attending: ORTHOPAEDIC SURGERY | Admitting: ORTHOPAEDIC SURGERY
Payer: COMMERCIAL

## 2024-12-19 ENCOUNTER — ANESTHESIA (OUTPATIENT)
Dept: OPERATING ROOM | Facility: HOSPITAL | Age: 24
End: 2024-12-19
Payer: COMMERCIAL

## 2024-12-19 ENCOUNTER — PHARMACY VISIT (OUTPATIENT)
Dept: PHARMACY | Facility: CLINIC | Age: 24
End: 2024-12-19
Payer: MEDICARE

## 2024-12-19 ENCOUNTER — APPOINTMENT (OUTPATIENT)
Dept: RADIOLOGY | Facility: HOSPITAL | Age: 24
End: 2024-12-19
Payer: COMMERCIAL

## 2024-12-19 VITALS
HEIGHT: 69 IN | WEIGHT: 222.44 LBS | BODY MASS INDEX: 32.95 KG/M2 | SYSTOLIC BLOOD PRESSURE: 119 MMHG | TEMPERATURE: 97.5 F | RESPIRATION RATE: 11 BRPM | DIASTOLIC BLOOD PRESSURE: 75 MMHG | OXYGEN SATURATION: 96 % | HEART RATE: 56 BPM

## 2024-12-19 DIAGNOSIS — M24.052 LOOSE BODY IN LEFT HIP: Primary | ICD-10-CM

## 2024-12-19 DIAGNOSIS — M25.852 FEMOROACETABULAR IMPINGEMENT OF LEFT HIP: ICD-10-CM

## 2024-12-19 DIAGNOSIS — S73.192A TEAR OF LEFT ACETABULAR LABRUM: ICD-10-CM

## 2024-12-19 DIAGNOSIS — S73.192A TEAR OF LEFT ACETABULAR LABRUM, INITIAL ENCOUNTER: ICD-10-CM

## 2024-12-19 LAB — PREGNANCY TEST URINE, POC: NEGATIVE

## 2024-12-19 PROCEDURE — 2500000004 HC RX 250 GENERAL PHARMACY W/ HCPCS (ALT 636 FOR OP/ED): Performed by: ORTHOPAEDIC SURGERY

## 2024-12-19 PROCEDURE — 76000 FLUOROSCOPY <1 HR PHYS/QHP: CPT | Mod: LT

## 2024-12-19 PROCEDURE — RXMED WILLOW AMBULATORY MEDICATION CHARGE

## 2024-12-19 PROCEDURE — 3700000001 HC GENERAL ANESTHESIA TIME - INITIAL BASE CHARGE: Performed by: ORTHOPAEDIC SURGERY

## 2024-12-19 PROCEDURE — 81025 URINE PREGNANCY TEST: CPT | Performed by: SPECIALIST/TECHNOLOGIST

## 2024-12-19 PROCEDURE — 2720000007 HC OR 272 NO HCPCS: Performed by: ORTHOPAEDIC SURGERY

## 2024-12-19 PROCEDURE — A29916 PR ARTHROSCOPY HIP W/LABRAL REPAIR: Performed by: ANESTHESIOLOGY

## 2024-12-19 PROCEDURE — 3700000002 HC GENERAL ANESTHESIA TIME - EACH INCREMENTAL 1 MINUTE: Performed by: ORTHOPAEDIC SURGERY

## 2024-12-19 PROCEDURE — 2780000003 HC OR 278 NO HCPCS: Performed by: ORTHOPAEDIC SURGERY

## 2024-12-19 PROCEDURE — 2500000004 HC RX 250 GENERAL PHARMACY W/ HCPCS (ALT 636 FOR OP/ED): Performed by: ANESTHESIOLOGIST ASSISTANT

## 2024-12-19 PROCEDURE — 7100000009 HC PHASE TWO TIME - INITIAL BASE CHARGE: Performed by: ORTHOPAEDIC SURGERY

## 2024-12-19 PROCEDURE — A29916 PR ARTHROSCOPY HIP W/LABRAL REPAIR: Performed by: ANESTHESIOLOGIST ASSISTANT

## 2024-12-19 PROCEDURE — C1713 ANCHOR/SCREW BN/BN,TIS/BN: HCPCS | Performed by: ORTHOPAEDIC SURGERY

## 2024-12-19 PROCEDURE — 3600000009 HC OR TIME - EACH INCREMENTAL 1 MINUTE - PROCEDURE LEVEL FOUR: Performed by: ORTHOPAEDIC SURGERY

## 2024-12-19 PROCEDURE — 2500000005 HC RX 250 GENERAL PHARMACY W/O HCPCS: Performed by: ANESTHESIOLOGIST ASSISTANT

## 2024-12-19 PROCEDURE — 7100000010 HC PHASE TWO TIME - EACH INCREMENTAL 1 MINUTE: Performed by: ORTHOPAEDIC SURGERY

## 2024-12-19 PROCEDURE — 2500000005 HC RX 250 GENERAL PHARMACY W/O HCPCS: Performed by: SPECIALIST/TECHNOLOGIST

## 2024-12-19 PROCEDURE — 2500000004 HC RX 250 GENERAL PHARMACY W/ HCPCS (ALT 636 FOR OP/ED): Performed by: ANESTHESIOLOGY

## 2024-12-19 PROCEDURE — 7100000002 HC RECOVERY ROOM TIME - EACH INCREMENTAL 1 MINUTE: Performed by: ORTHOPAEDIC SURGERY

## 2024-12-19 PROCEDURE — 7100000001 HC RECOVERY ROOM TIME - INITIAL BASE CHARGE: Performed by: ORTHOPAEDIC SURGERY

## 2024-12-19 PROCEDURE — 3600000004 HC OR TIME - INITIAL BASE CHARGE - PROCEDURE LEVEL FOUR: Performed by: ORTHOPAEDIC SURGERY

## 2024-12-19 PROCEDURE — 2500000005 HC RX 250 GENERAL PHARMACY W/O HCPCS: Performed by: ANESTHESIOLOGY

## 2024-12-19 PROCEDURE — 2500000001 HC RX 250 WO HCPCS SELF ADMINISTERED DRUGS (ALT 637 FOR MEDICARE OP): Performed by: ANESTHESIOLOGY

## 2024-12-19 PROCEDURE — 2500000001 HC RX 250 WO HCPCS SELF ADMINISTERED DRUGS (ALT 637 FOR MEDICARE OP): Performed by: SPECIALIST/TECHNOLOGIST

## 2024-12-19 PROCEDURE — 64999 UNLISTED PX NERVOUS SYSTEM: CPT | Performed by: ANESTHESIOLOGY

## 2024-12-19 DEVICE — IMPLANTABLE DEVICE: Type: IMPLANTABLE DEVICE | Site: HIP | Status: FUNCTIONAL

## 2024-12-19 DEVICE — 2.8MM Q-FIX ALL SUTURE ANCHOR
Type: IMPLANTABLE DEVICE | Site: HIP | Status: FUNCTIONAL
Brand: Q-FIX

## 2024-12-19 DEVICE — NANOTACK TT SUTURE ANCHOR, 1.4MM WITH 1.2MM XBRAID TT
Type: IMPLANTABLE DEVICE | Site: HIP | Status: FUNCTIONAL
Brand: NANOTACK

## 2024-12-19 RX ORDER — MORPHINE SULFATE 0.5 MG/ML
INJECTION, SOLUTION EPIDURAL; INTRATHECAL; INTRAVENOUS AS NEEDED
Status: DISCONTINUED | OUTPATIENT
Start: 2024-12-19 | End: 2024-12-19 | Stop reason: HOSPADM

## 2024-12-19 RX ORDER — GABAPENTIN 300 MG/1
600 CAPSULE ORAL ONCE
Status: COMPLETED | OUTPATIENT
Start: 2024-12-19 | End: 2024-12-19

## 2024-12-19 RX ORDER — INDOMETHACIN 75 MG/1
75 CAPSULE, EXTENDED RELEASE ORAL
Qty: 10 CAPSULE | Refills: 0 | Status: SHIPPED | OUTPATIENT
Start: 2024-12-19 | End: 2024-12-30

## 2024-12-19 RX ORDER — METHOCARBAMOL 100 MG/ML
INJECTION, SOLUTION INTRAMUSCULAR; INTRAVENOUS AS NEEDED
Status: DISCONTINUED | OUTPATIENT
Start: 2024-12-19 | End: 2024-12-19

## 2024-12-19 RX ORDER — DOCUSATE SODIUM 100 MG/1
100 CAPSULE, LIQUID FILLED ORAL 2 TIMES DAILY
Qty: 14 CAPSULE | Refills: 0 | Status: SHIPPED | OUTPATIENT
Start: 2024-12-19 | End: 2024-12-26

## 2024-12-19 RX ORDER — METHOCARBAMOL 750 MG/1
750 TABLET, FILM COATED ORAL 3 TIMES DAILY
Qty: 15 TABLET | Refills: 0 | Status: SHIPPED | OUTPATIENT
Start: 2024-12-19 | End: 2024-12-25

## 2024-12-19 RX ORDER — ONDANSETRON HYDROCHLORIDE 2 MG/ML
INJECTION, SOLUTION INTRAVENOUS AS NEEDED
Status: DISCONTINUED | OUTPATIENT
Start: 2024-12-19 | End: 2024-12-19

## 2024-12-19 RX ORDER — NAPROXEN SODIUM 220 MG/1
81 TABLET, FILM COATED ORAL 2 TIMES DAILY
Qty: 28 TABLET | Refills: 0 | Status: SHIPPED | OUTPATIENT
Start: 2024-12-19 | End: 2025-01-03

## 2024-12-19 RX ORDER — ALBUTEROL SULFATE 0.83 MG/ML
2.5 SOLUTION RESPIRATORY (INHALATION) ONCE AS NEEDED
Status: DISCONTINUED | OUTPATIENT
Start: 2024-12-19 | End: 2024-12-19 | Stop reason: HOSPADM

## 2024-12-19 RX ORDER — OXYCODONE HYDROCHLORIDE 5 MG/1
5 TABLET ORAL EVERY 4 HOURS PRN
Status: DISCONTINUED | OUTPATIENT
Start: 2024-12-19 | End: 2024-12-19 | Stop reason: HOSPADM

## 2024-12-19 RX ORDER — LABETALOL HYDROCHLORIDE 5 MG/ML
5 INJECTION, SOLUTION INTRAVENOUS ONCE AS NEEDED
Status: DISCONTINUED | OUTPATIENT
Start: 2024-12-19 | End: 2024-12-19 | Stop reason: HOSPADM

## 2024-12-19 RX ORDER — LIDOCAINE HYDROCHLORIDE 20 MG/ML
INJECTION, SOLUTION EPIDURAL; INFILTRATION; INTRACAUDAL; PERINEURAL AS NEEDED
Status: DISCONTINUED | OUTPATIENT
Start: 2024-12-19 | End: 2024-12-19

## 2024-12-19 RX ORDER — PROPOFOL 10 MG/ML
INJECTION, EMULSION INTRAVENOUS AS NEEDED
Status: DISCONTINUED | OUTPATIENT
Start: 2024-12-19 | End: 2024-12-19

## 2024-12-19 RX ORDER — HYDROMORPHONE HYDROCHLORIDE 1 MG/ML
INJECTION, SOLUTION INTRAMUSCULAR; INTRAVENOUS; SUBCUTANEOUS AS NEEDED
Status: DISCONTINUED | OUTPATIENT
Start: 2024-12-19 | End: 2024-12-19

## 2024-12-19 RX ORDER — CEFAZOLIN 1 G/1
INJECTION, POWDER, FOR SOLUTION INTRAVENOUS AS NEEDED
Status: DISCONTINUED | OUTPATIENT
Start: 2024-12-19 | End: 2024-12-19

## 2024-12-19 RX ORDER — OXYCODONE AND ACETAMINOPHEN 5; 325 MG/1; MG/1
1 TABLET ORAL EVERY 6 HOURS PRN
Qty: 20 TABLET | Refills: 0 | Status: SHIPPED | OUTPATIENT
Start: 2024-12-19 | End: 2024-12-25

## 2024-12-19 RX ORDER — SODIUM CHLORIDE, SODIUM LACTATE, POTASSIUM CHLORIDE, AND CALCIUM CHLORIDE .6; .31; .03; .02 G/100ML; G/100ML; G/100ML; G/100ML
IRRIGANT IRRIGATION AS NEEDED
Status: DISCONTINUED | OUTPATIENT
Start: 2024-12-19 | End: 2024-12-19 | Stop reason: HOSPADM

## 2024-12-19 RX ORDER — ONDANSETRON HYDROCHLORIDE 2 MG/ML
4 INJECTION, SOLUTION INTRAVENOUS ONCE AS NEEDED
Status: COMPLETED | OUTPATIENT
Start: 2024-12-19 | End: 2024-12-19

## 2024-12-19 RX ORDER — BUPIVACAINE HYDROCHLORIDE 5 MG/ML
INJECTION, SOLUTION EPIDURAL; INTRACAUDAL AS NEEDED
Status: DISCONTINUED | OUTPATIENT
Start: 2024-12-19 | End: 2024-12-19 | Stop reason: HOSPADM

## 2024-12-19 RX ORDER — HYDRALAZINE HYDROCHLORIDE 20 MG/ML
5 INJECTION INTRAMUSCULAR; INTRAVENOUS EVERY 30 MIN PRN
Status: DISCONTINUED | OUTPATIENT
Start: 2024-12-19 | End: 2024-12-19 | Stop reason: HOSPADM

## 2024-12-19 RX ORDER — SODIUM CHLORIDE, SODIUM LACTATE, POTASSIUM CHLORIDE, CALCIUM CHLORIDE 600; 310; 30; 20 MG/100ML; MG/100ML; MG/100ML; MG/100ML
100 INJECTION, SOLUTION INTRAVENOUS CONTINUOUS
Status: DISCONTINUED | OUTPATIENT
Start: 2024-12-19 | End: 2024-12-19 | Stop reason: HOSPADM

## 2024-12-19 RX ORDER — KETOROLAC TROMETHAMINE 30 MG/ML
INJECTION, SOLUTION INTRAMUSCULAR; INTRAVENOUS AS NEEDED
Status: DISCONTINUED | OUTPATIENT
Start: 2024-12-19 | End: 2024-12-19

## 2024-12-19 RX ORDER — MIDAZOLAM HYDROCHLORIDE 1 MG/ML
INJECTION, SOLUTION INTRAMUSCULAR; INTRAVENOUS AS NEEDED
Status: DISCONTINUED | OUTPATIENT
Start: 2024-12-19 | End: 2024-12-19

## 2024-12-19 RX ORDER — LIDOCAINE HYDROCHLORIDE 10 MG/ML
0.1 INJECTION, SOLUTION EPIDURAL; INFILTRATION; INTRACAUDAL; PERINEURAL ONCE
Status: DISCONTINUED | OUTPATIENT
Start: 2024-12-19 | End: 2024-12-19 | Stop reason: HOSPADM

## 2024-12-19 RX ORDER — ACETAMINOPHEN 325 MG/1
975 TABLET ORAL ONCE
Status: COMPLETED | OUTPATIENT
Start: 2024-12-19 | End: 2024-12-19

## 2024-12-19 RX ORDER — ROCURONIUM BROMIDE 10 MG/ML
INJECTION, SOLUTION INTRAVENOUS AS NEEDED
Status: DISCONTINUED | OUTPATIENT
Start: 2024-12-19 | End: 2024-12-19

## 2024-12-19 RX ORDER — BUPIVACAINE HCL/EPINEPHRINE 0.25-.0005
VIAL (ML) INJECTION AS NEEDED
Status: DISCONTINUED | OUTPATIENT
Start: 2024-12-19 | End: 2024-12-19

## 2024-12-19 RX ORDER — ONDANSETRON 4 MG/1
4 TABLET, FILM COATED ORAL EVERY 8 HOURS PRN
Qty: 3 TABLET | Refills: 0 | Status: SHIPPED | OUTPATIENT
Start: 2024-12-19 | End: 2024-12-20

## 2024-12-19 RX ORDER — FENTANYL CITRATE 50 UG/ML
INJECTION, SOLUTION INTRAMUSCULAR; INTRAVENOUS AS NEEDED
Status: DISCONTINUED | OUTPATIENT
Start: 2024-12-19 | End: 2024-12-19

## 2024-12-19 RX ORDER — OMEPRAZOLE 20 MG/1
20 CAPSULE, DELAYED RELEASE ORAL
Qty: 7 CAPSULE | Refills: 0 | Status: SHIPPED | OUTPATIENT
Start: 2024-12-19

## 2024-12-19 ASSESSMENT — PAIN - FUNCTIONAL ASSESSMENT
PAIN_FUNCTIONAL_ASSESSMENT: 0-10
PAIN_FUNCTIONAL_ASSESSMENT: 0-10
PAIN_FUNCTIONAL_ASSESSMENT: UNABLE TO SELF-REPORT
PAIN_FUNCTIONAL_ASSESSMENT: 0-10
PAIN_FUNCTIONAL_ASSESSMENT: UNABLE TO SELF-REPORT
PAIN_FUNCTIONAL_ASSESSMENT: 0-10

## 2024-12-19 ASSESSMENT — PAIN SCALES - GENERAL
PAINLEVEL_OUTOF10: 0 - NO PAIN
PAINLEVEL_OUTOF10: 7
PAINLEVEL_OUTOF10: 4
PAINLEVEL_OUTOF10: 5 - MODERATE PAIN
PAINLEVEL_OUTOF10: 7

## 2024-12-19 ASSESSMENT — COLUMBIA-SUICIDE SEVERITY RATING SCALE - C-SSRS
6. HAVE YOU EVER DONE ANYTHING, STARTED TO DO ANYTHING, OR PREPARED TO DO ANYTHING TO END YOUR LIFE?: NO
2. HAVE YOU ACTUALLY HAD ANY THOUGHTS OF KILLING YOURSELF?: NO
1. IN THE PAST MONTH, HAVE YOU WISHED YOU WERE DEAD OR WISHED YOU COULD GO TO SLEEP AND NOT WAKE UP?: NO

## 2024-12-19 NOTE — H&P
"History Of Present Illness  Jessica Perez is a 24 y.o. female presenting for left hip surgery. Denies any changes to her health.      Past Medical History  She has a past medical history of Allergic (2008) and Eczema (2012).    Surgical History  She has a past surgical history that includes Bivalve tooth extraction (6/2019) and Hip surgery (04/25/2024).     Social History  She reports that she has never smoked. She has never used smokeless tobacco. She reports current alcohol use of about 3.0 standard drinks of alcohol per week. She reports that she does not use drugs.    Family History  Family History   Problem Relation Name Age of Onset    Skin cancer Mother      Hyperlipidemia Father      Skin cancer Mother's Sister      Skin cancer Mother's Brother Rohan Urias     Diabetes Maternal Grandfather Rohan Urias     Hypertension Maternal Grandfather Rohan Urias     Skin cancer Maternal Grandfather Rohan Urias         Allergies  Tree nut and Tree nuts    Review of Systems   All other systems reviewed and are negative.       Physical Exam  Constitutional:       Appearance: Normal appearance.   Cardiovascular:      Rate and Rhythm: Normal rate and regular rhythm.   Pulmonary:      Effort: Pulmonary effort is normal.   Neurological:      Mental Status: She is alert.       LLE: Skin intact  +FADIR  N/V intact.      Last Recorded Vitals  Blood pressure 148/88, pulse 68, temperature 36.4 °C (97.5 °F), temperature source Temporal, resp. rate 13, height 1.753 m (5' 9\"), weight 101 kg (222 lb 7.1 oz), last menstrual period 12/12/2024, SpO2 98%.    Relevant Results      Scheduled medications  acetaminophen, 975 mg, oral, Once  gabapentin, 600 mg, oral, Once  povidone-iodine, , Topical, Once      Continuous medications     PRN medications    No results found for this or any previous visit (from the past 24 hours).    Assessment/Plan   Assessment & Plan  Tear of left acetabular labrum    Femoroacetabular " impingement of left hip    Loose body in left hip      OR this AM  NPO  Home post-op       I spent 15 minutes in the professional and overall care of this patient.      Raji Hinojosa MD

## 2024-12-19 NOTE — ANESTHESIA PROCEDURE NOTES
Airway  Date/Time: 12/19/2024 7:46 AM  Urgency: elective    Airway not difficult    Staffing  Performed: RENETTA   Authorized by: Deacon Carter MD    Performed by: RENETTA Samaniego  Patient location during procedure: OR    Indications and Patient Condition  Indications for airway management: anesthesia  Spontaneous Ventilation: absent  Sedation level: deep  Preoxygenated: yes  Mask difficulty assessment: 1 - vent by mask    Final Airway Details  Final airway type: endotracheal airway      Successful airway: ETT  Cuffed: yes   Successful intubation technique: direct laryngoscopy  Blade: Vang  Blade size: #2  ETT size (mm): 7.0  Cormack-Lehane Classification: grade I - full view of glottis  Placement verified by: chest auscultation and capnometry   Measured from: lips  ETT to lips (cm): 22  Number of attempts at approach: 1

## 2024-12-19 NOTE — ANESTHESIA PREPROCEDURE EVALUATION
Patient: Jessica Perez    Procedure Information       Anesthesia Start Date/Time: 12/19/24 0735    Procedure: Left Hip Arthroscopy; Labral Repair; Rim Trim; Osteoplasty; Capsular Plication; Labral Reconstruction IT Band (Left: Hip)    Location: [facility] OR 11 / [facility] OR    Surgeons: Deacon Norton MD          25 yo F hx anxiety, needle phobia, asthma as kid (no issues now), eczema.  Neg HCG.  Had same procedure on other hip 4/2024 without issues    Relevant Problems   Pulmonary   (+) Dyspnea on exertion   (+) Exercise-induced shortness of breath      Endocrine   (+) Obesity      Skin   (+) Eczema       Clinical information reviewed:   Tobacco  Allergies  Meds   Med Hx  Surg Hx  OB Status  Fam Hx  Soc   Hx        NPO Detail:  NPO/Void Status  Carbohydrate Drink Given Prior to Surgery? : N  Date of Last Liquid: 12/18/24  Time of Last Liquid: 2130  Date of Last Solid: 12/18/24  Time of Last Solid: 1800  Last Intake Type: Clear fluids  Time of Last Void: 0545         Physical Exam    Airway  Mallampati: III  TM distance: >3 FB  Neck ROM: full     Cardiovascular   Rate: normal     Dental - normal exam     Pulmonary - normal exam     Abdominal            Anesthesia Plan    History of general anesthesia?: yes  History of complications of general anesthesia?: no    ASA 2     general     intravenous induction   Postoperative administration of opioids is intended.  Anesthetic plan and risks discussed with patient.

## 2024-12-19 NOTE — ANESTHESIA PROCEDURE NOTES
Peripheral Block    Patient location during procedure: pre-op  Start time: 12/19/2024 7:16 AM  End time: 12/19/2024 7:22 AM  Reason for block: at surgeon's request and post-op pain management  Staffing  Performed: attending   Authorized by: Deacon Carter MD    Performed by: Deacon Carter MD  Preanesthetic Checklist  Completed: patient identified, IV checked, site marked, risks and benefits discussed, surgical consent, monitors and equipment checked, pre-op evaluation and timeout performed   Timeout performed at: 12/19/2024 7:16 AM  Peripheral Block  Patient position: laying flat  Prep: ChloraPrep  Patient monitoring: heart rate and continuous pulse ox  Block type: QL  Laterality: left  Injection technique: single-shot  Guidance: ultrasound guided  Local infiltration: lidocaine  Infiltration strength: 1 %  Dose: 1 mL  Needle  Needle gauge: 22 G  Needle length: 8 cm  Needle localization: ultrasound guidance  Assessment  Injection assessment: negative aspiration for heme, no paresthesia on injection, incremental injection and local visualized surrounding nerve on ultrasound

## 2024-12-19 NOTE — ANESTHESIA POSTPROCEDURE EVALUATION
Patient: Jessica Perez    Procedure Summary       Date: 12/19/24 Room / Location: [facility] A OR 11 / [facility] OR    Anesthesia Start: 0735 Anesthesia Stop: 1055    Procedure: Left Hip Arthroscopy; Labral Repair; Rim Trim; Osteoplasty; Capsular Plication; Labral Reconstruction IT Band (Left: Hip) Diagnosis:       Tear of left acetabular labrum, initial encounter      Femoroacetabular impingement of left hip      Loose body in left hip      (Tear of left acetabular labrum, initial encounter [S73.192A])      (Femoroacetabular impingement of left hip [M25.852])      (Loose body in left hip [M24.052])    Surgeons: Deacon Norton MD Responsible Provider: Deacon Carter MD    Anesthesia Type: general ASA Status: 2            Anesthesia Type: general    Vitals Value Taken Time   /78 12/19/24 1159   Temp 36.5 °C (97.7 °F) 12/19/24 1159   Pulse 55 12/19/24 1159   Resp 14 12/19/24 1159   SpO2 97 % 12/19/24 1159       Anesthesia Post Evaluation    Patient participation: complete - patient participated  Level of consciousness: awake  Pain management: satisfactory to patient  Airway patency: patent  Cardiovascular status: acceptable and hemodynamically stable  Respiratory status: acceptable and nonlabored ventilation  Hydration status: balanced  Postoperative Nausea and Vomiting: none        No notable events documented.

## 2024-12-19 NOTE — BRIEF OP NOTE
Date: 2024  OR Location: [facility] OR    Name: Jessica Perez, : 2000, Age: 24 y.o., MRN: [id number], Sex: female    Diagnosis  Pre-op Diagnosis      * Tear of left acetabular labrum, initial encounter [S73.192A]     * Femoroacetabular impingement of left hip [M25.852]     * Loose body in left hip [M24.052] Post-op Diagnosis     * Tear of left acetabular labrum, initial encounter [S73.192A]     * Femoroacetabular impingement of left hip [M25.852]     * Loose body in left hip [M24.052]     Procedures  Left Hip Arthroscopy; Labral Repair; Rim Trim; Osteoplasty; Capsular Plication; Labral Reconstruction IT Band  87360 - NC ARTHROSCOPY HIP W/ACETABULOPLASTY    NC ARTHROSCOPY HIP W/LABRAL REPAIR [50459]  NC ARTHROSCOPY HIP SURGICAL W/REMOVAL LOOSE/FB [55846]  NC UNLISTED PROCEDURE ARTHROSCOPY [42587]  NC ARTHROSCOPY HIP W/FEMOROPLASTY [88347]  Surgeons      * Deacon Norton - Primary    Resident/Fellow/Other Assistant:  Surgeons and Role:     * Oleksandr Quiles PA-C - Assisting    Staff:   Circulator: Justina Almonte Person: Cesar Almonte Person: Domo  Circulator: Pamela    Anesthesia Staff: Anesthesiologist: Deacon Carter MD  C-AA: RENETTA Samaniego    Procedure Summary  Anesthesia: General  ASA: II  Estimated Blood Loss: 5mL  Intra-op Medications:   Administrations occurring from 0730 to 1000 on 24:   Medication Name Total Dose   EPINEPHrine (Adrenalin) 1 mg/mL 3 mg in lactated Ringer's 9,000 mL irrigation 3 mg   ceFAZolin (Ancef) vial 1 g 2 g   dexAMETHasone (Decadron) injection 4 mg/mL 8 mg   fentaNYL (Sublimaze) injection 50 mcg/mL 100 mcg   LR bolus Cannot be calculated   lidocaine PF (Xylocaine-MPF) local injection 2 % 100 mg   lubricating eye drops ophthalmic solution 2 drop   methocarbamol (Robaxin) injection 1,000 mg   propofol (Diprivan) injection 10 mg/mL 200 mg   rocuronium (ZeMuron) 50 mg/5 mL injection 140 mg              Anesthesia Record               Intraprocedure I/O  Totals          Intake    LR bolus 250.00 mL    Total Intake 250 mL       Output    Est. Blood Loss 10 mL    Total Output 10 mL       Net    Net Volume 240 mL          Specimen: No specimens collected               Findings: calcified anterosuperior labrum.     Complications:  None; patient tolerated the procedure well.     Disposition: PACU - hemodynamically stable.  Condition: stable  Specimens Collected: No specimens collected  Attending Attestation: I was present and scrubbed for the entire procedure.    Deacon Norton  Phone Number: [phone]

## 2024-12-20 ENCOUNTER — EVALUATION (OUTPATIENT)
Dept: PHYSICAL THERAPY | Facility: HOSPITAL | Age: 24
End: 2024-12-20
Payer: COMMERCIAL

## 2024-12-20 DIAGNOSIS — Z47.89 ORTHOPEDIC AFTERCARE: Primary | ICD-10-CM

## 2024-12-20 DIAGNOSIS — M25.652 HIP STIFFNESS, LEFT: ICD-10-CM

## 2024-12-20 DIAGNOSIS — R29.898 WEAKNESS OF LEFT HIP: ICD-10-CM

## 2024-12-20 DIAGNOSIS — M25.552 LEFT HIP PAIN: ICD-10-CM

## 2024-12-20 DIAGNOSIS — S73.192D TEAR OF LEFT ACETABULAR LABRUM, SUBSEQUENT ENCOUNTER: ICD-10-CM

## 2024-12-20 PROCEDURE — 97140 MANUAL THERAPY 1/> REGIONS: CPT | Mod: GP | Performed by: PHYSICAL THERAPIST

## 2024-12-20 PROCEDURE — 97161 PT EVAL LOW COMPLEX 20 MIN: CPT | Mod: GP | Performed by: PHYSICAL THERAPIST

## 2024-12-20 PROCEDURE — 97110 THERAPEUTIC EXERCISES: CPT | Mod: GP | Performed by: PHYSICAL THERAPIST

## 2024-12-20 NOTE — PROGRESS NOTES
Physical Therapy  Physical Therapy Orthopedic Evaluation    Patient Name: Jessica Perez  MRN: [id number]  Today's Date: 12/20/2024  Time Calculation  Start Time: 1125  Stop Time: 1215  Time Calculation (min): 50 min    Insurance:  Visit number: 1 of 11  Authorization info: no auth and reset 1/1/2025   Insurance Type: [place] (no vaso)    General:  Reason for visit: Left Hip Arthroscopy; Labral Repair; Rim Trim   Referred by: Rafy Norton MD      Current Problem  1. Orthopedic aftercare  Follow Up In Physical Therapy      2. Tear of left acetabular labrum, subsequent encounter  Referral to Physical Therapy    Follow Up In Physical Therapy      3. Left hip pain  Follow Up In Physical Therapy      4. Hip stiffness, left  Follow Up In Physical Therapy      5. Weakness of left hip  Follow Up In Physical Therapy          Precautions: No active ER> 20 degrees x 3 weeks, WBAT with crutches, and Avoid hip flexion isometrics x 4 weeks  Precautions  STEADI Fall Risk Score (The score of 4 or more indicates an increased risk of falling): 0  Precautions Comment: No active ER> 20 degrees x 3 weeks, WBAT with crutches, and Avoid hip flexion isometrics x 4 weeks    Medical History Form: Reviewed (scanned into chart)    Subjective:     Chief Complaint: Patient presents to clinic s/p Left Hip Arthroscopy; Labral Repair; Rim Trim. I am familiar with patient as I have treated her after her right hip sx. Pt reports overall she is doing well. Pt is ambulating with brace and bilateral axillary crutches. Pt pain is well controlled with over the counter medications. Pt has CPM but has not started yet    Surgery Date: 12/19/2024  MAYELA: Overuse and Hockey    Current Condition:   Better at this point than sx on right    Pain:     Location: left hip anterior/lateral  Description: ache/tight  Aggravating Factors: Walking and Stair negotiation  Relieving Factors:  Rest and Ice    Relevant Information (PMH & Previous Tests/Imaging): n/a  Previous  Interventions/Treatments: Physical Therapy    Prior Level of Function (PLOF)  Patient previously independent with all ADLs  Exercise/Physical Activity: weight training and hockey  Work/School: progressive    Patients Living Environment: Reviewed and no concern    Primary Language: English    There are no spiritual/cultural practices/values/needs that are important to know    Patient's Goal(s) for Therapy: to return to active life style with no restrictions    Red Flags: Do you have any of the following? No  Fever/chills, unexplained weight changes, dizziness/fainting, unexplained change in bowel or bladder functions, unexplained malaise or muscle weakness, night pain/sweats, numbness or tingling  Signs of DVT including: Pain, swelling or tenderness to calf, warm or red skin, previous history of DVT    Objective:  Patient presents to clinic ambulating with bilateral axillary crutches     Surgical sight inspected: No signs of infection; Stitches intact and wound healing well.        ROM    Knee AROM (Degrees)      (R)  (L)  Flexion: WNL  WNL   Extension: WNL  WNL      Hip PROM (Degrees)      (R)  (L)  Flexion: 110  35   Extension: NT  NT  Abduction: 45  25  Adduction: NT  NT  ER:  40  20  IR:  35  20    Hip AROM (Degrees)  *Will be assessed at future visit secondary to post op precautions        STRENGTH TESTING  *No strength testing performed secondary to patient being post-op. Will be assessed at follow up visit.             Outcome Measures:  Other Measures  Lower Extremity Funtional Score (LEFS): 3/80     EDUCATION: home exercise program, plan of care, activity modifications, pain management, and injury pathology         Goals: Set and discussed today  Active       PT Problem       PT Goal 1       Start:  12/20/24    Expected End:  06/20/25       Patient will report <4/10 pain in R hip by 4 weeks   PROM R hip flexion 90 deg by 3-4 weeks, 110 deg by 6 weeks, abduction 20 deg by 3 weeks, 40 deg by 6 weeks, ER 20 deg  by 3 weeks, and IR 30 deg by 4-6 weeks to demonstrate improved joint mechanics to perform ADLs   LTG 12-16 weeks R hip strength: flexion, abduction, ER/IR, extension, glute max  >4+ to 5/5 MMT or 80% to demonstrate pelvic stability during ADLs and higher level functional tasks   Patient able to perform SL squat without valgus or anterior hip impingement to demonstrate LE biomechanics by 6- 8 weeks   LEFS > 30/80 to demonstrate improved ability to perform ADLs by 6 weeks               Plan of care was developed with input and agreement by the patient      Treatment Performed:  Therapeutic Exercise  Therapeutic Exercise Activity 1: supine ankle pumps  Therapeutic Exercise Activity 2: supine quad sets  Therapeutic Exercise Activity 3: supine PPT+glute sets  Therapeutic Exercise Activity 4: contralateral single knee to chest  Therapeutic Exercise Activity 5: supine hip adduction isometric with ball  Therapeutic Exercise Activity 6: caregiver circumduction  Therapeutic Exercise Activity 7: caregiver log roll    Manual Therapy  Manual Therapy Activity 1: STM adductors and quads  Manual Therapy Activity 2: PROM into flexion to tolerance  Manual Therapy Activity 3: log rolling IR  Manual Therapy Activity 4: circumduction      Assessment: Patient presents to clinic s/p Left Hip Arthroscopy; Labral Repair; Rim Trim on 12/19/2024. Clinical examination reveals pain, edema, and impaired mobility; strength, balance, and functional mobility to be assessed at future visit secondary to post op status. Patient is currently limited in all functional mobility; unable to ambulate, negotiate stairs, squat, kneel or participate in previous active lifestyle secondary to surgery. Patient will benefit from skilled PT intervention to return to all ADLs, community ambulation and recreational activities symptom free.       Clinical Presentation: Stable and/or uncomplicated characteristics    Plan:     Planned Interventions include: therapeutic  exercise, self-care home management, manual therapy, therapeutic activities, gait training, neuromuscular coordination, vasopneumatic, dry needling, aquatic therapy  Frequency: 2 x Week  Duration: 6 Months  Rehab Potential/Prognosis: Excellent      Edward Cornell, PT

## 2024-12-23 ENCOUNTER — TREATMENT (OUTPATIENT)
Dept: PHYSICAL THERAPY | Facility: HOSPITAL | Age: 24
End: 2024-12-23
Payer: COMMERCIAL

## 2024-12-23 DIAGNOSIS — S73.191A ACETABULAR LABRUM TEAR, RIGHT, INITIAL ENCOUNTER: ICD-10-CM

## 2024-12-23 DIAGNOSIS — M25.551 RIGHT HIP PAIN: ICD-10-CM

## 2024-12-23 DIAGNOSIS — Z47.89 ORTHOPEDIC AFTERCARE: ICD-10-CM

## 2024-12-23 DIAGNOSIS — R29.898 WEAKNESS OF RIGHT HIP: ICD-10-CM

## 2024-12-23 DIAGNOSIS — M25.651 HIP STIFFNESS, RIGHT: ICD-10-CM

## 2024-12-23 PROCEDURE — 97110 THERAPEUTIC EXERCISES: CPT | Mod: GP | Performed by: PHYSICAL THERAPIST

## 2024-12-23 PROCEDURE — 97140 MANUAL THERAPY 1/> REGIONS: CPT | Mod: GP | Performed by: PHYSICAL THERAPIST

## 2024-12-23 ASSESSMENT — PAIN - FUNCTIONAL ASSESSMENT: PAIN_FUNCTIONAL_ASSESSMENT: 0-10

## 2024-12-23 ASSESSMENT — PAIN SCALES - GENERAL: PAINLEVEL_OUTOF10: 0 - NO PAIN

## 2024-12-23 NOTE — OP NOTE
Left Hip Arthroscopy; Labral Repair; Rim Trim; Osteoplasty; Capsular Plication; Labral Reconstruction IT Band (L) Operative Note     Date: 2024  OR Location: [facility] OR    Name: Jessica Perez, : 2000, Age: 24 y.o., MRN: [id number], Sex: female    PREOPERATIVE DIAGNOSIS:   1. 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   2. Acetabular labral tear.   3. Intra-articular loose bodies, one of which required separate   cannula for its removal.   4. Mild hip instability noted on exam under anesthesia.       POSTOPERATIVE DIAGNOSIS:   1. 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   2. Acetabular labral tearing that was extensive with severe calcification of labrum with non-repairable labral tissue necessitating labral reconstruction.   3. Intra-articular loose bodies, one of which required separate   cannula for its removal.   4. Mild hip instability noted on exam under anesthesia.   5. Grade III changes superior acetabular rim from 12-3 o'clock  6. Grade II changes diffuse femoral head      OPERATION/PROCEDURE:   1. left hip arthroscopy with arthroscopic rim trim subspinous   decompression as a separate and identifiable procedure for pincer and   subspinous impingement.   2. Acetabular labral reconstruction with IT band allograft    3. Intra-articular loose body removal.   4. Femoral osteochondroplasty for CAM lesion.   5. Capsular plication.       SURGEON:   Deacon Norton MD.       ASSISTANT(S):   First assistant; Dontae Quiles PA-C.  Please note that we will be   billing for my physician's assistant as he was critical and   necessary for successful completion of this case including limb   positioning, anchor placement, suture management, and wound closure.     *we will be billing all codes with a 22 mod due to the complexity of the reconstruction requiring additional operative time and skill making it 2x more challenging than a standard case for all codes      TRACTION TIME:    Less than 1 hour.       INDICATION FOR PROCEDURE:   Pleasant patient presented to my office with   persistent left-sided hip pain that had failed conservative   management.  Advanced imaging had revealed a labral tear in the   setting of mixed impingement.  Risks and benefits of surgery have   been discussed with the patient including, but not limited to,   bleeding, infection, damage to nerves or blood vessels, need for   further procedures, risks of anesthesia, blood clots, progression of   osteoarthritis, incomplete pain relief, heterotopic ossification,   pudendal nerve palsy, lateral femoral cutaneous nerve palsy,   avascular necrosis requiring hip replacement.  The patient understood   these risks and wished to proceed with the operative intervention.       PROCEDURE AND FINDINGS:   The patient was identified in the preoperative holding area.   Operative extremity was marked with an indelible marker.  Informed   consent was reviewed.  Patient was taken to the operating room where a   time-out was performed verifying correct site, side, procedure, and   our special equipment.   They were placed supine on the operating room   table.  All bony prominences were well padded.  Patient was then prepped   and draped in usual sterile fashion after anesthesia induced was   without difficulty.  Once the patient was prepped and draped, the hip   was distracted via postless technique.  Standard anterolateral   arthroscopy portal was created.  A modified mid anterior portal   created.  A capsulotomy was performed.  Combination of a shaver and   radiofrequency device used to clean off the superior acetabular rim   identifying the patient's pincer and subspinous impingement, this was   taken down with a bur in the standard fashion as a separate and   identifiable procedure.  The labrum was noted to be severely damaged and   calcified.  Every attempt was made to preserve the patients native labrum   but the tissue was too  calcified and damaged to repair.  The remainder of   the damaged tissue was resected and 7 anchors were then placed close   to the rim.  The end sutures were used to measure the labral defect which   was a 5.5cm segment.  On the back table we prepared the IT band allograft   for labral reconstruction.  The graft was fixated at the ends and parachuted   into the joint.  The interval anchors were passed in a looped fashion and   this secured the labral graft to the acetabular rim nicely.  We noted excellent   fixation of labrum.  A few intra-articular loose bodies were removed   arthroscopically.  Head was reduced.  We noted excellent resolution   of the patient's suction seal.  We identified and protected the   lateral retinacular vessels throughout the remainder of the case.  An   osteoplasty was then performed from the medial to lateral synovial   fold and proximally and distally to the extent of lesion.  Dynamic   examination revealed no residual impingement.  An x-ray showed good   sphericity on multiple views.  We then performed a capsular plication   of the interportal capsulotomy with multiple sutures, tying these   down with alternating half hitches and clipping them with the knot.   We drained the hip, withdrew the arthroscope, closed the portals with   interrupted sutures, applied a sterile bandage.  The patient was   awoken from anesthesia without complication, transported to PACU in   stable condition.  Postoperative course will be standard hip   arthroscopy protocol.           Deacon Norton MD

## 2024-12-23 NOTE — PROGRESS NOTES
Physical Therapy  Physical Therapy Treatment Note    Patient Name: Jessica Perez  MRN: [id number]  Today's Date: 12/23/2024  Time Calculation  Start Time: 1400  Stop Time: 1500  Time Calculation (min): 60 min    Insurance:  Visit number: 2 of 11  Authorization info: no auth and reset 1/1/2025           Insurance Type: [place] (no vaso)    Current Problem  1. Acetabular labrum tear, right, initial encounter  Follow Up In Physical Therapy      2. Orthopedic aftercare  Follow Up In Physical Therapy      3. Right hip pain  Follow Up In Physical Therapy      4. Hip stiffness, right  Follow Up In Physical Therapy      5. Weakness of right hip  Follow Up In Physical Therapy          Precautions: Precautions  Precautions Comment: No active ER> 20 degrees x 3 weeks, 50% WB with crutches, and Avoid hip flexion isometrics x 4 weeks    General  Reason for Referral: Left Hip Arthroscopy; Labral Repair; Rim Trim  Referred By: Dr. Errol CHAMBERS  General Comment: sx 12/19/2024      Pain  Pain Assessment: 0-10  0-10 (Numeric) Pain Score: 0 - No pain    Subjective:   Patient reports she is doing well overall. Pt states she is up 50 deg on CPM      Performing HEP?: Yes      Objective:   HIP  Dressing change. No active bleeding and no signs of infection. Pt educated on showering precautions     Hip PROM  R hip flexion: (125°): 65  R hip ER: (45°): 25  R hip IR: (45°): 25    Treatment Performed:  Therapeutic Exercise  Therapeutic Exercise Activity 1: supine ankle pumps review  Therapeutic Exercise Activity 2: supine quad sets review  Therapeutic Exercise Activity 3: supine PPT+glute sets  Therapeutic Exercise Activity 4: contralateral single knee to chest  Therapeutic Exercise Activity 5: hooklying hip adduction isometric with ball/abd with belt  Therapeutic Exercise Activity 6: hip/heel rock  Therapeutic Exercise Activity 7: upright bike rocking  Therapeutic Exercise Activity 8: bridge 2x10    Manual Therapy  Manual Therapy Activity 1:  STM adductors and quads  Manual Therapy Activity 2: PROM into flexion to tolerance  Manual Therapy Activity 3: log rolling IR  Manual Therapy Activity 4: circumduction           Assessment:   The focus of the session was Strengthening, ROM, and soft tissue massage. The pt demonstrated Good tolerance to the noted exercises today. The pt is demonstrated Good progress in skilled rehab at this time. The pt is still limited in overall Strength, ROM, Flexibility, Motor control, Balance, Gait mechanics, Effusion, and Pain at this time. The pt continues to be a good candidate for skilled PT, in order to further improve Strength, ROM, Flexibility, Motor control, Balance, Gait mechanics, Effusion, and Pain.     Education: precautions    Plan:  Progress per protocol     Goals:  Active       PT Problem       PT Goal 1       Start:  12/20/24    Expected End:  06/20/25       Patient will report <4/10 pain in R hip by 4 weeks   PROM R hip flexion 90 deg by 3-4 weeks, 110 deg by 6 weeks, abduction 20 deg by 3 weeks, 40 deg by 6 weeks, ER 20 deg by 3 weeks, and IR 30 deg by 4-6 weeks to demonstrate improved joint mechanics to perform ADLs   LTG 12-16 weeks R hip strength: flexion, abduction, ER/IR, extension, glute max  >4+ to 5/5 MMT or 80% to demonstrate pelvic stability during ADLs and higher level functional tasks   Patient able to perform SL squat without valgus or anterior hip impingement to demonstrate LE biomechanics by 6- 8 weeks   LEFS > 30/80 to demonstrate improved ability to perform ADLs by 6 weeks               Edward Cornell PT, DPT, OCS, C-OMPT, ITPT

## 2024-12-26 ENCOUNTER — TREATMENT (OUTPATIENT)
Dept: PHYSICAL THERAPY | Facility: HOSPITAL | Age: 24
End: 2024-12-26
Payer: COMMERCIAL

## 2024-12-26 DIAGNOSIS — R29.898 WEAKNESS OF RIGHT HIP: ICD-10-CM

## 2024-12-26 DIAGNOSIS — M25.551 RIGHT HIP PAIN: ICD-10-CM

## 2024-12-26 DIAGNOSIS — S73.191A ACETABULAR LABRUM TEAR, RIGHT, INITIAL ENCOUNTER: ICD-10-CM

## 2024-12-26 DIAGNOSIS — Z47.89 ORTHOPEDIC AFTERCARE: ICD-10-CM

## 2024-12-26 DIAGNOSIS — M25.651 HIP STIFFNESS, RIGHT: ICD-10-CM

## 2024-12-26 PROCEDURE — 97110 THERAPEUTIC EXERCISES: CPT | Mod: GP | Performed by: PHYSICAL THERAPIST

## 2024-12-26 PROCEDURE — 97140 MANUAL THERAPY 1/> REGIONS: CPT | Mod: GP | Performed by: PHYSICAL THERAPIST

## 2024-12-26 NOTE — PROGRESS NOTES
Physical Therapy  Physical Therapy Treatment Note    Patient Name: Jessica Perez  MRN: [id number]  Today's Date: 12/26/2024  Time Calculation  Start Time: 1100  Stop Time: 1200  Time Calculation (min): 60 min    Insurance:  Visit number: 3 of 11  Authorization info: no auth and reset 1/1/2025           Insurance Type: [place] (no vaso)    Current Problem  1. Acetabular labrum tear, right, initial encounter  Follow Up In Physical Therapy      2. Orthopedic aftercare  Follow Up In Physical Therapy      3. Right hip pain  Follow Up In Physical Therapy      4. Hip stiffness, right  Follow Up In Physical Therapy      5. Weakness of right hip  Follow Up In Physical Therapy          Precautions: Precautions  Precautions Comment: No active ER> 20 degrees x 3 weeks, 50% WB with crutches, and Avoid hip flexion isometrics x 4 weeks    General  Reason for Referral: Left Hip Arthroscopy; Labral Repair; Rim Trim  Referred By: Dr. Errol CHAMBERS  General Comment: sx 12/19/2024      Pain  Pain Assessment: 0-10  0-10 (Numeric) Pain Score: 2    Subjective:   Patient reports she is feeling a little sore today. Pt has been walking around more secondary to holidays and has not been on the CPM as much      Performing HEP?: Yes      Objective:   HIP       Hip PROM  L hip flexion: (125°): 75  L hip ER: (45°): 25  L hip IR: (45°): 25    Treatment Performed:  Therapeutic Exercise  Therapeutic Exercise Activity 1: supine ankle pumps review  Therapeutic Exercise Activity 2: supine quad sets review  Therapeutic Exercise Activity 3: supine PPT+glute sets  Therapeutic Exercise Activity 4: contralateral single knee to chest  Therapeutic Exercise Activity 5: hooklying hip adduction isometric with ball/abd with belt  Therapeutic Exercise Activity 6: hip/heel rock  Therapeutic Exercise Activity 7: upright bike rocking  Therapeutic Exercise Activity 8: bridge 2x10    Manual Therapy  Manual Therapy Activity 1: STM adductors and quads  Manual Therapy  Activity 2: PROM into flexion to tolerance  Manual Therapy Activity 3: log rolling IR  Manual Therapy Activity 4: circumduction      Assessment:   The focus of the session was Strengthening, ROM, and soft tissue massage. The pt demonstrated Good tolerance to the noted exercises today. The pt is demonstrated Good progress in skilled rehab at this time. The pt is still limited in overall Strength, ROM, Flexibility, Motor control, Balance, Gait mechanics, and Pain at this time. The pt continues to be a good candidate for skilled PT, in order to further improve Strength, ROM, Flexibility, Motor control, Balance, Gait mechanics, and Pain.     Education: to not progress too fast    Plan:  Progress per protocol     Goals:  Active       PT Problem       PT Goal 1       Start:  12/20/24    Expected End:  06/20/25       Patient will report <4/10 pain in R hip by 4 weeks   PROM R hip flexion 90 deg by 3-4 weeks, 110 deg by 6 weeks, abduction 20 deg by 3 weeks, 40 deg by 6 weeks, ER 20 deg by 3 weeks, and IR 30 deg by 4-6 weeks to demonstrate improved joint mechanics to perform ADLs   LTG 12-16 weeks R hip strength: flexion, abduction, ER/IR, extension, glute max  >4+ to 5/5 MMT or 80% to demonstrate pelvic stability during ADLs and higher level functional tasks   Patient able to perform SL squat without valgus or anterior hip impingement to demonstrate LE biomechanics by 6- 8 weeks   LEFS > 30/80 to demonstrate improved ability to perform ADLs by 6 weeks               Edward Cornell PT, DPT, OCS, C-OMPT, ITPT

## 2024-12-27 ASSESSMENT — PAIN SCALES - GENERAL: PAINLEVEL_OUTOF10: 2

## 2024-12-27 ASSESSMENT — PAIN - FUNCTIONAL ASSESSMENT: PAIN_FUNCTIONAL_ASSESSMENT: 0-10

## 2024-12-30 ASSESSMENT — PAIN SCALES - GENERAL: PAINLEVEL_OUTOF10: 2

## 2024-12-30 ASSESSMENT — PAIN - FUNCTIONAL ASSESSMENT: PAIN_FUNCTIONAL_ASSESSMENT: 0-10

## 2024-12-31 ENCOUNTER — TREATMENT (OUTPATIENT)
Dept: PHYSICAL THERAPY | Facility: HOSPITAL | Age: 24
End: 2024-12-31
Payer: COMMERCIAL

## 2024-12-31 DIAGNOSIS — M25.552 LEFT HIP PAIN: ICD-10-CM

## 2024-12-31 DIAGNOSIS — R29.898 WEAKNESS OF LEFT HIP: ICD-10-CM

## 2024-12-31 DIAGNOSIS — Z47.89 ORTHOPEDIC AFTERCARE: Primary | ICD-10-CM

## 2024-12-31 DIAGNOSIS — M25.652 HIP STIFFNESS, LEFT: ICD-10-CM

## 2024-12-31 PROCEDURE — 97140 MANUAL THERAPY 1/> REGIONS: CPT | Mod: GP | Performed by: PHYSICAL THERAPIST

## 2024-12-31 PROCEDURE — 97110 THERAPEUTIC EXERCISES: CPT | Mod: GP | Performed by: PHYSICAL THERAPIST

## 2024-12-31 ASSESSMENT — PAIN SCALES - GENERAL: PAINLEVEL_OUTOF10: 1

## 2024-12-31 ASSESSMENT — PAIN - FUNCTIONAL ASSESSMENT: PAIN_FUNCTIONAL_ASSESSMENT: 0-10

## 2024-12-31 NOTE — PROGRESS NOTES
Physical Therapy  Physical Therapy Treatment Note    Patient Name: Jessica Perez  MRN: [id number]  Today's Date: 12/31/2024  Time Calculation  Start Time: 1200  Stop Time: 1300  Time Calculation (min): 60 min    Insurance:  Visit number: 4 of 11  Authorization info: no auth and reset 1/1/2025           Insurance Type: [place] (no vaso)    Current Problem  1. Orthopedic aftercare        2. Left hip pain        3. Hip stiffness, left        4. Weakness of left hip            Precautions: Precautions  Precautions Comment: No active ER> 20 degrees x 3 weeks, 50% WB with crutches, and Avoid hip flexion isometrics x 4 weeks    General  Reason for Referral: Left Hip Arthroscopy; Labral Repair; Rim Trim  Referred By: Dr. Errol CHAMBERS  General Comment: sx 12/19/2024      Pain  Pain Assessment: 0-10  0-10 (Numeric) Pain Score: 1    Subjective:   Patient reports she is doing well today. Pt reports less soreness. Pt continues to use CPM as prescribed      Performing HEP?: Yes      Objective:   HIP       Hip PROM  L hip flexion: (125°): 75  L hip ER: (45°): 25  L hip IR: (45°): 25    Treatment Performed:  Therapeutic Exercise  Therapeutic Exercise Activity 3: supine PPT+glute sets  Therapeutic Exercise Activity 4: contralateral single knee to chest  Therapeutic Exercise Activity 5: hooklying hip adduction isometric with ball/abd with belt  Therapeutic Exercise Activity 6: hip/heel rock  Therapeutic Exercise Activity 7: upright bike x5'  Therapeutic Exercise Activity 8: bridge 2x10    Manual Therapy  Manual Therapy Activity 1: STM adductors and quads  Manual Therapy Activity 2: PROM into flexion to tolerance  Manual Therapy Activity 3: log rolling IR  Manual Therapy Activity 4: circumduction      Assessment:   The focus of the session was Strengthening, ROM, Stretching, and soft tissue massage. The pt demonstrated Good tolerance to the noted exercises today. The pt is demonstrated Good progress in skilled rehab at this time. The  pt is still limited in overall Strength, ROM, Flexibility, Motor control, Balance, Gait mechanics, Effusion, and Pain at this time. The pt continues to be a good candidate for skilled PT, in order to further improve Strength, ROM, Flexibility, Motor control, Balance, Gait mechanics, Effusion, and Pain.     Education: continue HEP as prescribed    Plan:  Progress per protocol     Goals:  Active       PT Problem       PT Goal 1       Start:  12/20/24    Expected End:  06/20/25       Patient will report <4/10 pain in R hip by 4 weeks   PROM R hip flexion 90 deg by 3-4 weeks, 110 deg by 6 weeks, abduction 20 deg by 3 weeks, 40 deg by 6 weeks, ER 20 deg by 3 weeks, and IR 30 deg by 4-6 weeks to demonstrate improved joint mechanics to perform ADLs   LTG 12-16 weeks R hip strength: flexion, abduction, ER/IR, extension, glute max  >4+ to 5/5 MMT or 80% to demonstrate pelvic stability during ADLs and higher level functional tasks   Patient able to perform SL squat without valgus or anterior hip impingement to demonstrate LE biomechanics by 6- 8 weeks   LEFS > 30/80 to demonstrate improved ability to perform ADLs by 6 weeks               Edward Cornell PT, DPT, OCS, C-OMPT, ITPT

## 2025-01-03 ENCOUNTER — TREATMENT (OUTPATIENT)
Dept: PHYSICAL THERAPY | Facility: HOSPITAL | Age: 25
End: 2025-01-03
Payer: COMMERCIAL

## 2025-01-03 ENCOUNTER — OFFICE VISIT (OUTPATIENT)
Dept: ORTHOPEDIC SURGERY | Facility: HOSPITAL | Age: 25
End: 2025-01-03
Payer: COMMERCIAL

## 2025-01-03 DIAGNOSIS — M25.552 LEFT HIP PAIN: ICD-10-CM

## 2025-01-03 DIAGNOSIS — Z47.89 ORTHOPEDIC AFTERCARE: Primary | ICD-10-CM

## 2025-01-03 DIAGNOSIS — M25.852 FEMOROACETABULAR IMPINGEMENT OF LEFT HIP: Primary | ICD-10-CM

## 2025-01-03 DIAGNOSIS — R29.898 WEAKNESS OF LEFT HIP: ICD-10-CM

## 2025-01-03 DIAGNOSIS — S73.192D TEAR OF LEFT ACETABULAR LABRUM, SUBSEQUENT ENCOUNTER: ICD-10-CM

## 2025-01-03 DIAGNOSIS — M25.652 HIP STIFFNESS, LEFT: ICD-10-CM

## 2025-01-03 PROCEDURE — 97110 THERAPEUTIC EXERCISES: CPT | Mod: GP | Performed by: PHYSICAL THERAPIST

## 2025-01-03 PROCEDURE — 1036F TOBACCO NON-USER: CPT | Performed by: SPECIALIST/TECHNOLOGIST

## 2025-01-03 PROCEDURE — 97140 MANUAL THERAPY 1/> REGIONS: CPT | Mod: GP | Performed by: PHYSICAL THERAPIST

## 2025-01-03 PROCEDURE — 99211 OFF/OP EST MAY X REQ PHY/QHP: CPT | Performed by: SPECIALIST/TECHNOLOGIST

## 2025-01-03 ASSESSMENT — PAIN SCALES - GENERAL: PAINLEVEL_OUTOF10: 0 - NO PAIN

## 2025-01-03 ASSESSMENT — PAIN - FUNCTIONAL ASSESSMENT: PAIN_FUNCTIONAL_ASSESSMENT: 0-10

## 2025-01-03 NOTE — PROGRESS NOTES
"  Physical Therapy  Physical Therapy Treatment Note    Patient Name: Jessica Perez  MRN: [id number]  Today's Date: 1/3/2025  Time Calculation  Start Time: 0730  Stop Time: 0830  Time Calculation (min): 60 min    Insurance:  Visit number: 1 of 50 (6 total)  Authorization info: no auth and reset 1/1/2025           Insurance Type: [place] (no vaso)    Current Problem  1. Orthopedic aftercare        2. Left hip pain        3. Hip stiffness, left        4. Weakness of left hip        5. Tear of left acetabular labrum, subsequent encounter            Precautions  No active ER> 20 degrees x 3 weeks, 50% WB with crutches, and Avoid hip flexion isometrics x 4 weeks    General  Reason for Referral: Left Hip Arthroscopy; Labral Repair; Rim Trim  Referred By: Dr. Errol CHAMBERS  DOS: 12/19/2024  POW: 2     Pain  Pain Assessment: 0-10  0-10 (Numeric) Pain Score: 0 - No pain    Subjective:   Patient reports she is doing well overall. Pt has follow up with Dontae Quiles PA-C today. Pt CPM at 90 deg      Performing HEP?: Yes      Objective:   HIP     Hip PROM  L hip flexion: (125°): 90  L hip ER: (45°): 30  L hip IR: (45°): 25      Treatment Performed:  Therapeutic Exercise  Therapeutic Exercise Activity 1: hook lying fallouts 3x10  Therapeutic Exercise Activity 2: hook lying adduction ball squeeze 10x5\" holdsx2  Therapeutic Exercise Activity 3: hook lying abduction iso with belt 10x5\" holds x2  Therapeutic Exercise Activity 4: hook lying bridge 3x10  Therapeutic Exercise Activity 5: hip to heel rock 3x10  Therapeutic Exercise Activity 6: modified front plank 3x10\" holds  Therapeutic Exercise Activity 7: PB knees to chest x20  Therapeutic Exercise Activity 8: upright bike x10'    Manual Therapy  Manual Therapy Activity 1: STM adductors and quads  Manual Therapy Activity 2: PROM into flexion to tolerance  Manual Therapy Activity 3: log rolling IR  Manual Therapy Activity 4: circumduction      Assessment:   The focus of the session was " Strengthening, ROM, and soft tissue massage. The pt demonstrated Good tolerance to the noted exercises today. The pt is demonstrated Good progress in skilled rehab at this time. The pt is still limited in overall Strength, ROM, Flexibility, Motor control, Balance, Gait mechanics, Effusion, and Pain at this time. The pt continues to be a good candidate for skilled PT, in order to further improve Strength, ROM, Flexibility, Motor control, Balance, Gait mechanics, Effusion, and Pain.     Education: continue HEP as prescribed    Plan:  Progress per protocol     Goals:  Active       PT Problem       PT Goal 1       Start:  12/20/24    Expected End:  06/20/25       Patient will report <4/10 pain in R hip by 4 weeks   PROM R hip flexion 90 deg by 3-4 weeks, 110 deg by 6 weeks, abduction 20 deg by 3 weeks, 40 deg by 6 weeks, ER 20 deg by 3 weeks, and IR 30 deg by 4-6 weeks to demonstrate improved joint mechanics to perform ADLs   LTG 12-16 weeks R hip strength: flexion, abduction, ER/IR, extension, glute max  >4+ to 5/5 MMT or 80% to demonstrate pelvic stability during ADLs and higher level functional tasks   Patient able to perform SL squat without valgus or anterior hip impingement to demonstrate LE biomechanics by 6- 8 weeks   LEFS > 30/80 to demonstrate improved ability to perform ADLs by 6 weeks             Hui Brennan, MS, AT, ATC       Edward Cornell PT, DPT, OCS, C-OMPT, ITPT

## 2025-01-03 NOTE — LETTER
January 8, 2025     Patient: Jessica Perez   YOB: 2000   Date of Visit: 1/3/2025       To Whom It May Concern:    It is my medical opinion that Jessica Perez may return to work on 1/13/2025.  Please allow time for continued physical therapy to optimize her hip in the post operative setting.  Additionally, allow for breaks as needed. .    If you have any questions or concerns, please don't hesitate to call.         Sincerely,        Oleksandr Quiles PA-C    CC: No Recipients

## 2025-01-03 NOTE — PROGRESS NOTES
The patient returns, with her mother, status post 2 weeks left hip arthroscopy on 12/19/2024.  Overall she is doing well.  Denies adverse events or issues since time of surgery. Narcotic medication is no longer needed.  They have no evidence of lower extremity DVT.  Negative Homans.  Incisions are clean and dry.  Healing well.  Sutures were removed today.  Steri-Strips applied.  It is okay to get the incisions wet.  Avoid submerging in a hot tub, bathtub, swimming pool or directly under the shower.  Their pain is appropriate.  Range of motion is within normal limits.    [facility]  She may discontinue her brace and begin to wean herself off of her crutches as she can tolerate.  She should avoid walking with a limp, pain or feeling as though her leg is dragging behind her.  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  She feels that she would be ready to return to her job in the next couple of weeks.  She will reach out to the office when she is ready to do so.  We will provide her with a note stating to allow for breaks as she needs for the first 3 weeks back from her procedure.  We will plan to see them back 6 weeks from surgery.  Goal is to get back to pain-free and symmetric range of motion compared to right hip.  They are in agreement the plan.  Questions are answered.      Oleksandr Quiles PA-C

## 2025-01-07 ENCOUNTER — TREATMENT (OUTPATIENT)
Dept: PHYSICAL THERAPY | Facility: HOSPITAL | Age: 25
End: 2025-01-07
Payer: COMMERCIAL

## 2025-01-07 DIAGNOSIS — R29.898 WEAKNESS OF LEFT HIP: ICD-10-CM

## 2025-01-07 DIAGNOSIS — M25.652 HIP STIFFNESS, LEFT: ICD-10-CM

## 2025-01-07 DIAGNOSIS — Z47.89 ORTHOPEDIC AFTERCARE: Primary | ICD-10-CM

## 2025-01-07 DIAGNOSIS — M25.552 LEFT HIP PAIN: ICD-10-CM

## 2025-01-07 DIAGNOSIS — S73.192D TEAR OF LEFT ACETABULAR LABRUM, SUBSEQUENT ENCOUNTER: ICD-10-CM

## 2025-01-07 PROCEDURE — 97110 THERAPEUTIC EXERCISES: CPT | Mod: GP | Performed by: PHYSICAL THERAPIST

## 2025-01-07 PROCEDURE — 97140 MANUAL THERAPY 1/> REGIONS: CPT | Mod: GP | Performed by: PHYSICAL THERAPIST

## 2025-01-07 ASSESSMENT — PAIN SCALES - GENERAL: PAINLEVEL_OUTOF10: 4

## 2025-01-07 ASSESSMENT — PAIN - FUNCTIONAL ASSESSMENT: PAIN_FUNCTIONAL_ASSESSMENT: 0-10

## 2025-01-07 NOTE — PROGRESS NOTES
"  Physical Therapy  Physical Therapy Treatment Note    Patient Name: Jessica Perez  MRN: [id number]  Today's Date: 1/7/2025  Time Calculation  Start Time: 1300  Stop Time: 1420  Time Calculation (min): 80 min    Insurance:  Visit number: 2 of 50 (7 total)  Authorization info: no auth and reset 1/1/2025           Insurance Type: [place] (no vaso)    Current Problem  1. Orthopedic aftercare        2. Left hip pain        3. Hip stiffness, left        4. Weakness of left hip        5. Tear of left acetabular labrum, subsequent encounter              Precautions  No active ER> 20 degrees x 3 weeks, 50% WB with crutches, and Avoid hip flexion isometrics x 4 weeks    General  Reason for Referral: Left Hip Arthroscopy; Labral Repair; Rim Trim  Referred By: Dr. Errol CHAMBERS  DOS: 12/19/2024  POW: 2.5 weeks    Pain  Pain Assessment: 0-10  0-10 (Numeric) Pain Score: 4    Subjective:   Pt arrived to therapy reporting she has increase soreness today compared to previous session. Pt states she had a good follow up with ortho and sutures removed. Pt continues to ambulate with bilateral axillary crutches and brace was discontinued      Performing HEP?: Yes      Objective:   HIP    Hip PROM  L hip flexion: (125°): 90*  L hip ER: (45°): 25*  L hip IR: (45°): 20*      Treatment Performed:   Therapeutic Exercise:    40 min  PB knee to chest 2x20  Bent knee fallouts 2x15  Hook lying bridge with orange TB loop 3x10  Hook lying adduction ball squeeze and hip abduction isometric 10x5\" holds 2x10  Prone quad stretch with strap 30\"on/30\"off x 3  Prone hip ER/IR 3x10  Prone heel squeeze 10x5\" x2 round  Hip to heel rock backs 3x10  Upright bike x 5'    Therapeutic Activity:    0 min      Manual Therapy:    30 min  STM adductors, glutes, hip flexors, posterior hip rotators  PROM in all planes of motion within precautions   Circumduction   Long rolling    Neuromuscular Re-education:  0 min      Therapeutic Modalities:   10 min  Vaso low " compression 34 deg in prone     Other:      0 min             Assessment:   The focus of the session was Strengthening, ROM, and soft tissue massage. The pt demonstrated Good tolerance to the noted exercises today. The pt is demonstrated Good progress in skilled rehab at this time. Pt had increase soreness and was stiffer today compared to previous session but ROM and pain improved following session. The pt is still limited in overall Strength, ROM, Flexibility, Motor control, Balance, Gait mechanics, Effusion, and Pain at this time. The pt continues to be a good candidate for skilled PT, in order to further improve Strength, ROM, Flexibility, Motor control, Balance, Gait mechanics, Effusion, and Pain.     Education: continue HEP as prescribed    Plan:  Progress per protocol     Goals:          Edward Cornell PT, DPT, OCS, C-OMPT, ITPT

## 2025-01-09 ENCOUNTER — TREATMENT (OUTPATIENT)
Dept: PHYSICAL THERAPY | Facility: HOSPITAL | Age: 25
End: 2025-01-09
Payer: COMMERCIAL

## 2025-01-09 DIAGNOSIS — M25.552 LEFT HIP PAIN: ICD-10-CM

## 2025-01-09 DIAGNOSIS — Z47.89 ORTHOPEDIC AFTERCARE: Primary | ICD-10-CM

## 2025-01-09 DIAGNOSIS — R29.898 WEAKNESS OF LEFT HIP: ICD-10-CM

## 2025-01-09 DIAGNOSIS — S73.191A ACETABULAR LABRUM TEAR, RIGHT, INITIAL ENCOUNTER: ICD-10-CM

## 2025-01-09 DIAGNOSIS — S73.192D TEAR OF LEFT ACETABULAR LABRUM, SUBSEQUENT ENCOUNTER: ICD-10-CM

## 2025-01-09 DIAGNOSIS — M25.652 HIP STIFFNESS, LEFT: ICD-10-CM

## 2025-01-09 PROCEDURE — 97140 MANUAL THERAPY 1/> REGIONS: CPT | Mod: GP | Performed by: PHYSICAL THERAPIST

## 2025-01-09 PROCEDURE — 97110 THERAPEUTIC EXERCISES: CPT | Mod: GP | Performed by: PHYSICAL THERAPIST

## 2025-01-09 PROCEDURE — 97016 VASOPNEUMATIC DEVICE THERAPY: CPT | Mod: GP | Performed by: PHYSICAL THERAPIST

## 2025-01-09 ASSESSMENT — PAIN - FUNCTIONAL ASSESSMENT: PAIN_FUNCTIONAL_ASSESSMENT: 0-10

## 2025-01-09 ASSESSMENT — PAIN SCALES - GENERAL: PAINLEVEL_OUTOF10: 1

## 2025-01-09 NOTE — PROGRESS NOTES
"  Physical Therapy  Physical Therapy Treatment Note    Patient Name: Jessica Perez  MRN: [id number]  Today's Date: 1/9/2025  Time Calculation  Start Time: 1330  Stop Time: 1450  Time Calculation (min): 80 min    Insurance:  Visit number: 2 of 50 (8 total)  Authorization info: no auth and reset 1/1/2025           Insurance Type: [place] (no vaso)    Current Problem  1. Orthopedic aftercare        2. Left hip pain        3. Hip stiffness, left        4. Weakness of left hip        5. Tear of left acetabular labrum, subsequent encounter        6. Acetabular labrum tear, right, initial encounter                Precautions  No active ER> 20 degrees x 3 weeks, 50% WB with crutches, and Avoid hip flexion isometrics x 4 weeks    General  Reason for Referral: Left Hip Arthroscopy; Labral Repair; Rim Trim  Referred By: Dr. Errol CHAMBERS  DOS: 12/19/2024  PO: 3 weeks    Pain  Pain Assessment: 0-10  0-10 (Numeric) Pain Score: 1    Subjective:   Pt reports she is feeling much better than previous session. Pt continues to utilize bilateral axillary crutches. Pt continues to bike at home      Performing HEP?: Yes      Objective:   HIP    Hip PROM  L hip flexion: (125°): 100  L hip ER: (45°): 25  L hip IR: (45°): 20      Treatment Performed:  Therapeutic Exercise:    34 min  PB knee to chest 2x20  Bent knee fallouts 2x15  Hook lying bridge with ball squeeze 3x10  Hook lying adduction ball squeeze and hip abduction isometric 10x5\" holds 2x10  Prone quad stretch with strap 1x2'  Prone hip ER/IR 3x10  Prone heel squeeze 10x5\" x2 round  Hip to heel rock backs 3x10 NT  Upright bike x 5'    Therapeutic Activity:    6 min  Treadmill walking SL x3' DL x3' speed 1.5mph    Manual Therapy:    30 min  STM adductors, glutes, hip flexors, posterior hip rotators  PROM in all planes of motion within precautions   Circumduction   Long rolling    Neuromuscular Re-education:   0  min      Therapeutic Modalities:   10 min  Vaso low compression 34 deg in " prone    Other:       min          Assessment:   The focus of the session was strength, ROM, soft tissue massage, and functional training. The pt demonstrated good tolerance to the noted exercises today by improved ROM and less pain. The pt is demonstrated good progress in skilled rehab at this time. The pt is still limited in overall strength, ROM, flexibility, motor control, balance, gait mechanics, and pain at this time. The pt continues to be a good candidate for skilled PT, in order to further improve strength, ROM, flexibility, motor control, balance, gait mechanics, and pain.      Education: continue HEP as prescribed    Plan:  Progress per protocol     Goals:  Active       PT Problem       PT Goal 1       Start:  12/20/24    Expected End:  06/20/25       Patient will report <4/10 pain in R hip by 4 weeks   PROM R hip flexion 90 deg by 3-4 weeks, 110 deg by 6 weeks, abduction 20 deg by 3 weeks, 40 deg by 6 weeks, ER 20 deg by 3 weeks, and IR 30 deg by 4-6 weeks to demonstrate improved joint mechanics to perform ADLs   LTG 12-16 weeks R hip strength: flexion, abduction, ER/IR, extension, glute max  >4+ to 5/5 MMT or 80% to demonstrate pelvic stability during ADLs and higher level functional tasks   Patient able to perform SL squat without valgus or anterior hip impingement to demonstrate LE biomechanics by 6- 8 weeks   LEFS > 30/80 to demonstrate improved ability to perform ADLs by 6 weeks                   Edward Cornell PT, DPT, OCS, C-OMPT, ITPT

## 2025-01-14 ENCOUNTER — TREATMENT (OUTPATIENT)
Dept: PHYSICAL THERAPY | Facility: HOSPITAL | Age: 25
End: 2025-01-14
Payer: COMMERCIAL

## 2025-01-14 DIAGNOSIS — M25.552 LEFT HIP PAIN: ICD-10-CM

## 2025-01-14 DIAGNOSIS — R29.898 WEAKNESS OF LEFT HIP: ICD-10-CM

## 2025-01-14 DIAGNOSIS — S73.192D TEAR OF LEFT ACETABULAR LABRUM, SUBSEQUENT ENCOUNTER: ICD-10-CM

## 2025-01-14 DIAGNOSIS — Z47.89 ORTHOPEDIC AFTERCARE: Primary | ICD-10-CM

## 2025-01-14 DIAGNOSIS — M25.652 HIP STIFFNESS, LEFT: ICD-10-CM

## 2025-01-14 PROCEDURE — 97110 THERAPEUTIC EXERCISES: CPT | Mod: GP

## 2025-01-14 PROCEDURE — 97140 MANUAL THERAPY 1/> REGIONS: CPT | Mod: GP

## 2025-01-14 ASSESSMENT — PAIN SCALES - GENERAL: PAINLEVEL_OUTOF10: 1

## 2025-01-14 ASSESSMENT — PAIN - FUNCTIONAL ASSESSMENT: PAIN_FUNCTIONAL_ASSESSMENT: 0-10

## 2025-01-14 NOTE — PROGRESS NOTES
"  Physical Therapy  Physical Therapy Treatment Note    Patient Name: Jessica Perez  MRN: [id number]  Today's Date: 1/14/2025  Time Calculation  Start Time: 1114  Stop Time: 1215  Time Calculation (min): 61 min    Insurance:  Visit number: 4 of 50 (9 total)  Authorization info: no auth and reset 1/1/2025           Insurance Type: [place] (no vaso)    Current Problem  1. Orthopedic aftercare        2. Left hip pain        3. Hip stiffness, left        4. Weakness of left hip        5. Tear of left acetabular labrum, subsequent encounter              Precautions  No active ER> 20 degrees x 3 weeks, 50% WB with crutches, and Avoid hip flexion isometrics x 4 weeks    General  Reason for Referral: Left Hip Arthroscopy; Labral Repair; Rim Trim  Referred By: Dr. Errol CHAMBERS  DOS: 12/19/2024  Days post-op: 26  PO: 3 weeks    Pain  Pain Assessment: 0-10  0-10 (Numeric) Pain Score: 1    Subjective:   Pt reports she is feeling good overall. Has no pain on arrival, but feels very tight in front of hip.       Performing HEP?: Yes      Objective:   HIP    Hip PROM *post MT  L hip flexion: (125°): 106  L hip ER: (45°): 30  L hip IR: (45°): 20  L hip IR (prone): 25    Treatment Performed:  Therapeutic Exercise:    30 min  PB knee to chest 2x20  Bent knee fallouts 2x15  Hip to heel rock backs 3x10 NP  Prone hip ER/IR 2x20  Prone quad stretch with strap 2x1'  Prone hip extension 3\" hold 2 x 12   Prone heel squeeze 10x5\" x2 round   Hook lying bridge with ball squeeze 2x10; with green TB 2x10  Hook lying adduction ball squeeze and hip abduction isometric 10x5\" holds 2x10 NP  Upright bike x 5'    Therapeutic Activity:    6 min  Treadmill walking SL x3' DL x3' speed 1.5mph    Manual Therapy:    25 min  STM adductors, glutes, hip flexors, posterior hip rotators  PROM in all planes of motion within precautions   Circumduction   Long rolling  Lateral hip distraction grade 2/3     Neuromuscular Re-education:   0  min      Therapeutic " Modalities:     min  Deferred ice to home - was provided ice to go    Other:       min          Assessment:   The focus of the session was strength, ROM, soft tissue massage, and functional training. IR at 0 and 45 deg initially painful at end range, however, following lateral hip mobilization pain was eliminated and ROM improved by 10 deg. The pt demonstrated good tolerance to the noted exercises today by improved ROM and less pain. The pt is demonstrated good progress in skilled rehab at this time. The pt is still limited in overall strength, ROM, flexibility, motor control, balance, gait mechanics, and pain at this time. The pt continues to be a good candidate for skilled PT, in order to further improve strength, ROM, flexibility, motor control, balance, gait mechanics, and pain.      Education: continue HEP as prescribed    Plan:  Progress per protocol     Goals:  Active       PT Problem       PT Goal 1       Start:  12/20/24    Expected End:  06/20/25       Patient will report <4/10 pain in R hip by 4 weeks   PROM R hip flexion 90 deg by 3-4 weeks, 110 deg by 6 weeks, abduction 20 deg by 3 weeks, 40 deg by 6 weeks, ER 20 deg by 3 weeks, and IR 30 deg by 4-6 weeks to demonstrate improved joint mechanics to perform ADLs   LTG 12-16 weeks R hip strength: flexion, abduction, ER/IR, extension, glute max  >4+ to 5/5 MMT or 80% to demonstrate pelvic stability during ADLs and higher level functional tasks   Patient able to perform SL squat without valgus or anterior hip impingement to demonstrate LE biomechanics by 6- 8 weeks   LEFS > 30/80 to demonstrate improved ability to perform ADLs by 6 weeks                     Manny Dalton P.T, D.P.T

## 2025-01-16 ENCOUNTER — APPOINTMENT (OUTPATIENT)
Dept: PHYSICAL THERAPY | Facility: HOSPITAL | Age: 25
End: 2025-01-16
Payer: COMMERCIAL

## 2025-01-20 ENCOUNTER — APPOINTMENT (OUTPATIENT)
Facility: CLINIC | Age: 25
End: 2025-01-20
Payer: COMMERCIAL

## 2025-01-20 VITALS
BODY MASS INDEX: 34.21 KG/M2 | SYSTOLIC BLOOD PRESSURE: 126 MMHG | HEIGHT: 69 IN | OXYGEN SATURATION: 98 % | RESPIRATION RATE: 18 BRPM | TEMPERATURE: 98.4 F | DIASTOLIC BLOOD PRESSURE: 80 MMHG | HEART RATE: 74 BPM | WEIGHT: 231 LBS

## 2025-01-20 DIAGNOSIS — E66.811 OBESITY (BMI 30.0-34.9): ICD-10-CM

## 2025-01-20 DIAGNOSIS — Z23 INFLUENZA VACCINE ADMINISTERED: ICD-10-CM

## 2025-01-20 DIAGNOSIS — F17.290 NICOTINE DEPENDENCE DUE TO VAPING TOBACCO PRODUCT: ICD-10-CM

## 2025-01-20 DIAGNOSIS — E55.9 VITAMIN D DEFICIENCY: ICD-10-CM

## 2025-01-20 DIAGNOSIS — L20.9 ATOPIC DERMATITIS, UNSPECIFIED TYPE: ICD-10-CM

## 2025-01-20 DIAGNOSIS — Z91.018 ALLERGY TO TREE NUTS: ICD-10-CM

## 2025-01-20 DIAGNOSIS — R53.83 FATIGUE, UNSPECIFIED TYPE: ICD-10-CM

## 2025-01-20 DIAGNOSIS — Z00.00 HEALTHCARE MAINTENANCE: Primary | ICD-10-CM

## 2025-01-20 PROCEDURE — 90656 IIV3 VACC NO PRSV 0.5 ML IM: CPT | Performed by: FAMILY MEDICINE

## 2025-01-20 PROCEDURE — 3008F BODY MASS INDEX DOCD: CPT | Performed by: FAMILY MEDICINE

## 2025-01-20 PROCEDURE — 90471 IMMUNIZATION ADMIN: CPT | Performed by: FAMILY MEDICINE

## 2025-01-20 PROCEDURE — 99395 PREV VISIT EST AGE 18-39: CPT | Performed by: FAMILY MEDICINE

## 2025-01-20 RX ORDER — EPINEPHRINE 0.3 MG/.3ML
INJECTION SUBCUTANEOUS
Qty: 2 EACH | Refills: 0 | Status: SHIPPED | OUTPATIENT
Start: 2025-01-20

## 2025-01-20 RX ORDER — TRIAMCINOLONE ACETONIDE 1 MG/G
CREAM TOPICAL 2 TIMES DAILY
Qty: 30 G | Refills: 0 | Status: SHIPPED | OUTPATIENT
Start: 2025-01-20

## 2025-01-20 ASSESSMENT — PATIENT HEALTH QUESTIONNAIRE - PHQ9
2. FEELING DOWN, DEPRESSED OR HOPELESS: NOT AT ALL
SUM OF ALL RESPONSES TO PHQ9 QUESTIONS 1 AND 2: 0
1. LITTLE INTEREST OR PLEASURE IN DOING THINGS: NOT AT ALL

## 2025-01-20 ASSESSMENT — ENCOUNTER SYMPTOMS
SHORTNESS OF BREATH: 0
HEADACHES: 0

## 2025-01-20 NOTE — ASSESSMENT & PLAN NOTE
Nicotine use - patient is aware of the health risks associated with nicotine/smoking use.  Pt aware of the need to quit smoking/using nicotine.  Patient was counseled on smoking cessation, treatment options discussed today with patient.

## 2025-01-20 NOTE — PROGRESS NOTES
"Subjective     Jessica Perez is a 24 y.o. female who presents for Annual Exam.    HPI     Pt is here today for annual well exam. Pt is new to me.    She struggles with her diet.  She will be seeing nutritionist later this month.      Hx of left hip labral tear repair in Dec  2024  Hx of left hip labral tear repair in April 2024  Ortho is Dr. Deacon Norton.  PT is Edward Cornell.      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    Cardiovascular:  Negative for chest pain.   Neurological:  Negative for headaches.       Objective     Vitals:    01/20/25 1317 01/20/25 1417   BP: 145/85 126/80   BP Location: Left arm    Patient Position: Sitting    Pulse: 74    Resp: 18    Temp: 36.9 °C (98.4 °F)    TempSrc: Temporal    SpO2: 98%    Weight: 105 kg (231 lb)    Height: 1.753 m (5' 9\")         Current Outpatient Medications   Medication Instructions    cetirizine (ZyrTEC) 10 mg tablet Take by mouth.    EPINEPHrine 0.3 mg/0.3 mL injection syringe INJECT INTRAMUSCULARLY FOR SYMPTOMS OF ANAPHYLAXIS. THEN CALL 911    etonogestreL-ethinyl estradioL (Nuvaring) 0.12-0.015 mg/24 hr vaginal ring 1 each, vaginal, Every 28 days    triamcinolone (Kenalog) 0.1 % cream Topical, 2 times daily        Allergies   Allergen Reactions    Tree Nut Anaphylaxis    Tree Nuts Unknown        Past Surgical History:   Procedure Laterality Date    HIP SURGERY  04/25/2024    WISDOM TOOTH EXTRACTION  6/2019        Social History     Tobacco Use    Smoking status: Never    Smokeless tobacco: Never   Vaping Use    Vaping status: Every Day    Substances: Nicotine    Devices: Disposable   Substance Use Topics    Alcohol use: Yes     Alcohol/week: 3.0 standard drinks of alcohol     Types: 3 Standard drinks or equivalent per week     Comment: ocass    Drug use: Never        Family History   Problem Relation Name Age of Onset    Skin cancer Mother      Hyperlipidemia Father      Skin cancer " Mother's Sister      Skin cancer Mother's Brother Rohan Urias     Diabetes Maternal Grandfather Rohan Urias     Hypertension Maternal Grandfather Rohan Urias     Skin cancer Maternal Grandfather Rohan Urias         Immunization History   Administered Date(s) Administered    COVID-19, mRNA, LNP-S, PF, 30 mcg/0.3 mL dose 04/08/2021, 04/29/2021    DTaP vaccine, pediatric  (INFANRIX) 03/13/2002, 11/15/2005    DTaP, Unspecified 2000, 02/23/2001, 04/24/2001    Flu vaccine (IIV4), preservative free *Check age/dose* 11/09/2018, 11/17/2021    Flu vaccine, trivalent, preservative free, age 6 months and greater (Fluarix/Fluzone/Flulaval) 01/20/2025    HPV, Quadrivalent 09/21/2012, 11/28/2012, 04/08/2013    HPV, Unspecified 09/21/2012, 11/28/2012, 04/08/2013    Hepatitis A vaccine, pediatric/adolescent (HAVRIX, VAQTA) 07/20/2007, 07/31/2009    Hepatitis B vaccine, 19 yrs and under (RECOMBIVAX, ENGERIX) 2000, 2000, 04/24/2001    HiB PRP-T conjugate vaccine (HIBERIX, ACTHIB) 2000, 02/23/2001    HiB, unspecified 04/24/2001    Hib / Hep B 04/24/2001    Influenza, live, intranasal 09/21/2012, 08/23/2013    Influenza, live, intranasal, quadrivalent 09/21/2012, 08/23/2013, 09/03/2014    MMR vaccine, subcutaneous (MMR II) 11/09/2001, 11/19/2001, 11/15/2005    Meningococcal ACWY vaccine (MENVEO) 07/10/2018    Meningococcal ACWY-D (Menactra) 4-valent conjugate vaccine 09/21/2012    Meningococcal B vaccine (BEXSERO) 11/09/2018    Meningococcal B, Unspecified 07/10/2018    Pneumococcal Conjugate PCV 7 2000, 02/23/2001, 04/24/2001, 03/13/2002    Poliovirus vaccine, subcutaneous (IPOL) 2000, 02/23/2001, 11/09/2001, 03/13/2002, 11/15/2005    Tdap vaccine, age 7 year and older (BOOSTRIX, ADACEL) 09/21/2012, 07/27/2023    Varicella vaccine, subcutaneous (VARIVAX) 11/09/2001, 08/11/2010        Physical Exam  Vitals reviewed.   Constitutional:       General: She is not in acute  distress.     Appearance: Normal appearance. She is obese. She is not ill-appearing.   HENT:      Head: Normocephalic and atraumatic.      Right Ear: Tympanic membrane, ear canal and external ear normal.      Left Ear: Tympanic membrane, ear canal and external ear normal.      Mouth/Throat:      Mouth: Mucous membranes are moist.      Pharynx: No oropharyngeal exudate or posterior oropharyngeal erythema.   Neck:      Thyroid: No thyroid mass or thyromegaly.   Cardiovascular:      Rate and Rhythm: Normal rate and regular rhythm.      Heart sounds: No murmur heard.  Pulmonary:      Effort: No respiratory distress.      Breath sounds: Normal breath sounds. No wheezing, rhonchi or rales.   Abdominal:      General: There is no distension.      Palpations: Abdomen is soft.      Tenderness: There is no abdominal tenderness.   Lymphadenopathy:      Cervical: No cervical adenopathy.   Skin:     General: Skin is warm and dry.      Comments: Dry raised red patches on hands, atopic derm   Neurological:      Mental Status: She is alert and oriented to person, place, and time. Mental status is at baseline.   Psychiatric:         Mood and Affect: Mood normal.         Behavior: Behavior normal.         Assessment & Plan  Healthcare maintenance  Well Exam - new patient     Pap testing - 11/2024, ASCUS - will repeat 1 year per  GYN.     Vaccines - tdap utd    Flu vaccine given today     I recommend yearly flu vaccine and routine COVID vaccinations as indicated     Complete labs     Orders:    CBC and Auto Differential; Future    Comprehensive Metabolic Panel; Future    Lipid Panel; Future    Hemoglobin A1C; Future    Nicotine dependence due to vaping tobacco product  Nicotine use - patient is aware of the health risks associated with nicotine/smoking use.  Pt aware of the need to quit smoking/using nicotine.  Patient was counseled on smoking cessation, treatment options discussed today with patient.          Vitamin D  deficiency    Orders:    Vitamin D 25-Hydroxy,Total (for eval of Vitamin D levels); Future    Influenza vaccine administered    Orders:    Flu vaccine, trivalent, preservative free, age 6 months and greater (Fluraix/Fluzone/Flulaval)    Allergy to tree nuts    Orders:    EPINEPHrine 0.3 mg/0.3 mL injection syringe; INJECT INTRAMUSCULARLY FOR SYMPTOMS OF ANAPHYLAXIS. THEN CALL 911    Atopic dermatitis, unspecified type  Uses triamcinolone cream as needed.  Uses moisturizer.   Orders:    triamcinolone (Kenalog) 0.1 % cream; Apply topically 2 times a day.    Referral to Dermatology    Fatigue, unspecified type    Orders:    TSH with reflex to Free T4 if abnormal; Future    Obesity (BMI 30.0-34.9)  Healthy diet, routine exercise was discussed with patient    Orders:    TSH with reflex to Free T4 if abnormal; Future

## 2025-01-20 NOTE — ASSESSMENT & PLAN NOTE
Orders:    EPINEPHrine 0.3 mg/0.3 mL injection syringe; INJECT INTRAMUSCULARLY FOR SYMPTOMS OF ANAPHYLAXIS. THEN CALL 911

## 2025-01-21 ENCOUNTER — TREATMENT (OUTPATIENT)
Dept: PHYSICAL THERAPY | Facility: HOSPITAL | Age: 25
End: 2025-01-21
Payer: COMMERCIAL

## 2025-01-21 DIAGNOSIS — R29.898 WEAKNESS OF LEFT HIP: ICD-10-CM

## 2025-01-21 DIAGNOSIS — M25.652 HIP STIFFNESS, LEFT: ICD-10-CM

## 2025-01-21 DIAGNOSIS — M25.552 LEFT HIP PAIN: ICD-10-CM

## 2025-01-21 DIAGNOSIS — Z47.89 ORTHOPEDIC AFTERCARE: Primary | ICD-10-CM

## 2025-01-21 PROCEDURE — 97140 MANUAL THERAPY 1/> REGIONS: CPT | Mod: GP | Performed by: PHYSICAL THERAPIST

## 2025-01-21 PROCEDURE — 97110 THERAPEUTIC EXERCISES: CPT | Mod: GP | Performed by: PHYSICAL THERAPIST

## 2025-01-21 ASSESSMENT — PAIN SCALES - GENERAL: PAINLEVEL_OUTOF10: 2

## 2025-01-21 ASSESSMENT — PAIN - FUNCTIONAL ASSESSMENT: PAIN_FUNCTIONAL_ASSESSMENT: 0-10

## 2025-01-21 NOTE — PROGRESS NOTES
"  Physical Therapy  Physical Therapy Treatment Note    Patient Name: Jessica Perez  MRN: [id number]  Today's Date: 1/21/2025  Time Calculation  Start Time: 1300  Stop Time: 1405  Time Calculation (min): 65 min    Insurance:  Visit number: 4 of 50 (9 total)  Authorization info: no auth and reset 1/1/2025           Insurance Type: [place] (no vaso)    Current Problem  1. Orthopedic aftercare        2. Left hip pain        3. Hip stiffness, left        4. Weakness of left hip                Precautions  No active ER> 20 degrees x 3 weeks, 50% WB with crutches, and Avoid hip flexion isometrics x 4 weeks    General  Reason for Referral: Left Hip Arthroscopy; Labral Repair; Rim Trim  Referred By: Dr. Errol CHAMBERS  DOS: 12/19/2024  Days post-op: 33  PO: 4 weeks    Pain  Pain Assessment: 0-10  0-10 (Numeric) Pain Score: 2  Pain Type: Surgical pain  Pain Location: Hip  Pain Orientation: Anterior    Subjective:   Pt arrived to therapy reporting she is feeling stiff today. Pt was not able to do HEP the past couple days because she was sick. Pt reports she felt better over the weekend      Performing HEP?: Yes      Objective:   HIP    Hip PROM *post MT     L hip IR (prone): 25  L hip flexion:100  Treatment Performed:  Therapeutic Exercise:    2530 min  PB knee to chest 2x20  Bent knee fallouts 2x15  Hip to heel rock backs 3x10 NP  Prone hip ER/IR 2x20  Prone quad stretch with strap 2x1'  Prone hip extension 3\" hold 2 x 12 NT  Prone heel squeeze 10x5\" x2 round NT  Hook lying bridge with with red TB 2x10  Upright bike x 5'    Therapeutic Activity:    5  min  Treadmill walking SL x3' DL x3' speed 1.5mph    Manual Therapy:    3030 min  STM adductors, glutes, hip flexors, posterior hip rotators  PROM in all planes of motion within precautions   Circumduction   Long rolling  Lateral hip distraction grade 2/3     Neuromuscular Re-education:   0  min      Therapeutic Modalities:   0  min  Deferred ice to home - was provided ice to " go    Other:       min          Assessment:   The focus of the session was strength, ROM, stretching, joint mobilization, and soft tissue massage. The pt demonstrated good tolerance to the noted exercises today by improved ROM post session. The pt is demonstrated good progress in skilled rehab at this time. The pt is still limited in overall strength, ROM, flexibility, motor control, balance, gait mechanics, and pain at this time. The pt continues to be a good candidate for skilled PT, in order to further improve strength, ROM, flexibility, motor control, balance, gait mechanics, and pain.       Education: continue HEP as prescribed    Plan:  Progress per protocol     Goals:  Active       PT Problem       PT Goal 1       Start:  12/20/24    Expected End:  06/20/25       Patient will report <4/10 pain in R hip by 4 weeks   PROM R hip flexion 90 deg by 3-4 weeks, 110 deg by 6 weeks, abduction 20 deg by 3 weeks, 40 deg by 6 weeks, ER 20 deg by 3 weeks, and IR 30 deg by 4-6 weeks to demonstrate improved joint mechanics to perform ADLs   LTG 12-16 weeks R hip strength: flexion, abduction, ER/IR, extension, glute max  >4+ to 5/5 MMT or 80% to demonstrate pelvic stability during ADLs and higher level functional tasks   Patient able to perform SL squat without valgus or anterior hip impingement to demonstrate LE biomechanics by 6- 8 weeks   LEFS > 30/80 to demonstrate improved ability to perform ADLs by 6 weeks                       Manny Dalton P.T, EVETTE.P.T

## 2025-01-23 ENCOUNTER — TREATMENT (OUTPATIENT)
Dept: PHYSICAL THERAPY | Facility: HOSPITAL | Age: 25
End: 2025-01-23
Payer: COMMERCIAL

## 2025-01-23 DIAGNOSIS — Z47.89 ORTHOPEDIC AFTERCARE: Primary | ICD-10-CM

## 2025-01-23 DIAGNOSIS — S73.192D TEAR OF LEFT ACETABULAR LABRUM, SUBSEQUENT ENCOUNTER: ICD-10-CM

## 2025-01-23 DIAGNOSIS — M25.552 LEFT HIP PAIN: ICD-10-CM

## 2025-01-23 DIAGNOSIS — M25.652 HIP STIFFNESS, LEFT: ICD-10-CM

## 2025-01-23 DIAGNOSIS — R29.898 WEAKNESS OF LEFT HIP: ICD-10-CM

## 2025-01-23 PROCEDURE — 97140 MANUAL THERAPY 1/> REGIONS: CPT | Mod: GP | Performed by: PHYSICAL THERAPIST

## 2025-01-23 PROCEDURE — 97016 VASOPNEUMATIC DEVICE THERAPY: CPT | Mod: GP | Performed by: PHYSICAL THERAPIST

## 2025-01-23 PROCEDURE — 97110 THERAPEUTIC EXERCISES: CPT | Mod: GP | Performed by: PHYSICAL THERAPIST

## 2025-01-23 ASSESSMENT — PAIN - FUNCTIONAL ASSESSMENT: PAIN_FUNCTIONAL_ASSESSMENT: 0-10

## 2025-01-23 ASSESSMENT — PAIN SCALES - GENERAL: PAINLEVEL_OUTOF10: 2

## 2025-01-23 NOTE — PROGRESS NOTES
"  Physical Therapy  Physical Therapy Treatment Note    Patient Name: Jessica Perez  MRN: [id number]  Today's Date: 1/23/2025  Time Calculation  Start Time: 1330  Stop Time: 1440  Time Calculation (min): 70 min    Insurance:  Visit number: 5 of 50 (9 total)  Authorization info: no auth and reset 1/1/2025           Insurance Type: [place] (no vaso)    Current Problem  1. Orthopedic aftercare        2. Left hip pain        3. Hip stiffness, left        4. Weakness of left hip        5. Tear of left acetabular labrum, subsequent encounter                  Precautions  No active ER> 20 degrees x 3 weeks, 50% WB with crutches, and Avoid hip flexion isometrics x 4 weeks    General  Reason for Referral: Left Hip Arthroscopy; Labral Repair; Rim Trim  Referred By: Dr. Errol CHAMBERS  DOS: 12/19/2024  Days post-op: 35  PO: 5 weeks    Pain  Pain Assessment: 0-10  0-10 (Numeric) Pain Score: 2  Pain Type: Surgical pain  Pain Location: Hip  Pain Orientation: Anterior    Subjective:   Pt arrived to therapy reporting her hip feels stiff and tired today. Pt states she does feel better than previous session.      Performing HEP?: Yes      Objective:   HIP    Hip PROM *post MT     L hip IR (prone): 25  L hip flexion:100  Treatment Performed:  Therapeutic Exercise:    30 min  PB knee to chest 2x20  Bent knee fallouts 2x15  Hip to heel rock backs 3x10 NP  Prone hip ER/IR 2x20  Prone quad stretch with strap 2x1'  Prone hip extension 3\" hold 2 x 12   Prone heel squeeze 10x5\" x2 round NT  Hook lying bridge 2x10  Upright bike x 7'  Treadmill retro walking 1.5    Therapeutic Activity:      min  Treadmill walking SL x3' DL x3' speed 1.5mph    Manual Therapy:    30 min  STM adductors, glutes, hip flexors, posterior hip rotators  PROM in all planes of motion within precautions   Circumduction   Long rolling  Lateral hip distraction grade 2/3   Prone PA in frog position with IR/ER     Neuromuscular Re-education:   0  min      Therapeutic Modalities: "   10 min  Vaso 34 deg min light compression in prone    Other:       min          Assessment:   The focus of the session was ROM, stretching, joint mobilization, soft tissue massage, and gait training. The pt demonstrated fair tolerance to the noted exercises today. Pr continues to be limited in ROM . The pt is demonstrated fair progress in skilled rehab at this time. The pt is still limited in overall strength, ROM, flexibility, motor control, balance, gait mechanics, and pain at this time. The pt continues to be a good candidate for skilled PT, in order to further improve strength, ROM, flexibility, motor control, balance, gait mechanics, and pain.        Education: continue HEP as prescribed    Plan:  Progress per protocol     Goals:  Active       PT Problem       PT Goal 1       Start:  12/20/24    Expected End:  06/20/25       Patient will report <4/10 pain in R hip by 4 weeks   PROM R hip flexion 90 deg by 3-4 weeks, 110 deg by 6 weeks, abduction 20 deg by 3 weeks, 40 deg by 6 weeks, ER 20 deg by 3 weeks, and IR 30 deg by 4-6 weeks to demonstrate improved joint mechanics to perform ADLs   LTG 12-16 weeks R hip strength: flexion, abduction, ER/IR, extension, glute max  >4+ to 5/5 MMT or 80% to demonstrate pelvic stability during ADLs and higher level functional tasks   Patient able to perform SL squat without valgus or anterior hip impingement to demonstrate LE biomechanics by 6- 8 weeks   LEFS > 30/80 to demonstrate improved ability to perform ADLs by 6 weeks                         Manny Dalton P.T, D.P.T

## 2025-01-25 ENCOUNTER — LAB (OUTPATIENT)
Dept: LAB | Facility: LAB | Age: 25
End: 2025-01-25
Payer: COMMERCIAL

## 2025-01-25 DIAGNOSIS — Z00.00 HEALTHCARE MAINTENANCE: ICD-10-CM

## 2025-01-25 DIAGNOSIS — E55.9 VITAMIN D DEFICIENCY: ICD-10-CM

## 2025-01-25 DIAGNOSIS — R53.83 FATIGUE, UNSPECIFIED TYPE: ICD-10-CM

## 2025-01-25 DIAGNOSIS — E66.811 OBESITY (BMI 30.0-34.9): ICD-10-CM

## 2025-01-25 LAB
25(OH)D3 SERPL-MCNC: 18 NG/ML (ref 30–100)
ALBUMIN SERPL BCP-MCNC: 3.9 G/DL (ref 3.4–5)
ALP SERPL-CCNC: 99 U/L (ref 33–110)
ALT SERPL W P-5'-P-CCNC: 28 U/L (ref 7–45)
ANION GAP SERPL CALC-SCNC: 11 MMOL/L (ref 10–20)
AST SERPL W P-5'-P-CCNC: 16 U/L (ref 9–39)
BASOPHILS # BLD AUTO: 0.08 X10*3/UL (ref 0–0.1)
BASOPHILS NFR BLD AUTO: 0.9 %
BILIRUB SERPL-MCNC: 0.4 MG/DL (ref 0–1.2)
BUN SERPL-MCNC: 7 MG/DL (ref 6–23)
CALCIUM SERPL-MCNC: 9.3 MG/DL (ref 8.6–10.6)
CHLORIDE SERPL-SCNC: 103 MMOL/L (ref 98–107)
CHOLEST SERPL-MCNC: 174 MG/DL (ref 0–199)
CHOLESTEROL/HDL RATIO: 2.9
CO2 SERPL-SCNC: 28 MMOL/L (ref 21–32)
CREAT SERPL-MCNC: 0.56 MG/DL (ref 0.5–1.05)
EGFRCR SERPLBLD CKD-EPI 2021: >90 ML/MIN/1.73M*2
EOSINOPHIL # BLD AUTO: 0.21 X10*3/UL (ref 0–0.7)
EOSINOPHIL NFR BLD AUTO: 2.4 %
ERYTHROCYTE [DISTWIDTH] IN BLOOD BY AUTOMATED COUNT: 11.5 % (ref 11.5–14.5)
EST. AVERAGE GLUCOSE BLD GHB EST-MCNC: 94 MG/DL
GLUCOSE SERPL-MCNC: 84 MG/DL (ref 74–99)
HBA1C MFR BLD: 4.9 %
HCT VFR BLD AUTO: 40.4 % (ref 36–46)
HDLC SERPL-MCNC: 59.8 MG/DL
HGB BLD-MCNC: 12.6 G/DL (ref 12–16)
IMM GRANULOCYTES # BLD AUTO: 0.03 X10*3/UL (ref 0–0.7)
IMM GRANULOCYTES NFR BLD AUTO: 0.3 % (ref 0–0.9)
LDLC SERPL CALC-MCNC: 95 MG/DL
LYMPHOCYTES # BLD AUTO: 2.04 X10*3/UL (ref 1.2–4.8)
LYMPHOCYTES NFR BLD AUTO: 23.6 %
MCH RBC QN AUTO: 29 PG (ref 26–34)
MCHC RBC AUTO-ENTMCNC: 31.2 G/DL (ref 32–36)
MCV RBC AUTO: 93 FL (ref 80–100)
MONOCYTES # BLD AUTO: 0.58 X10*3/UL (ref 0.1–1)
MONOCYTES NFR BLD AUTO: 6.7 %
NEUTROPHILS # BLD AUTO: 5.69 X10*3/UL (ref 1.2–7.7)
NEUTROPHILS NFR BLD AUTO: 66.1 %
NON HDL CHOLESTEROL: 114 MG/DL (ref 0–149)
NRBC BLD-RTO: 0 /100 WBCS (ref 0–0)
PLATELET # BLD AUTO: 430 X10*3/UL (ref 150–450)
POTASSIUM SERPL-SCNC: 4.1 MMOL/L (ref 3.5–5.3)
PROT SERPL-MCNC: 7.2 G/DL (ref 6.4–8.2)
RBC # BLD AUTO: 4.34 X10*6/UL (ref 4–5.2)
SODIUM SERPL-SCNC: 138 MMOL/L (ref 136–145)
TRIGL SERPL-MCNC: 96 MG/DL (ref 0–114)
TSH SERPL-ACNC: 2.23 MIU/L (ref 0.44–3.98)
VLDL: 19 MG/DL (ref 0–40)
WBC # BLD AUTO: 8.6 X10*3/UL (ref 4.4–11.3)

## 2025-01-25 PROCEDURE — 83036 HEMOGLOBIN GLYCOSYLATED A1C: CPT

## 2025-01-25 PROCEDURE — 80053 COMPREHEN METABOLIC PANEL: CPT

## 2025-01-25 PROCEDURE — 82306 VITAMIN D 25 HYDROXY: CPT

## 2025-01-25 PROCEDURE — 85025 COMPLETE CBC W/AUTO DIFF WBC: CPT

## 2025-01-25 PROCEDURE — 84443 ASSAY THYROID STIM HORMONE: CPT

## 2025-01-25 PROCEDURE — 80061 LIPID PANEL: CPT

## 2025-01-28 ENCOUNTER — TREATMENT (OUTPATIENT)
Dept: PHYSICAL THERAPY | Facility: HOSPITAL | Age: 25
End: 2025-01-28
Payer: COMMERCIAL

## 2025-01-28 DIAGNOSIS — Z47.89 ENCOUNTER FOR OTHER ORTHOPEDIC AFTERCARE: ICD-10-CM

## 2025-01-28 DIAGNOSIS — M25.652 HIP STIFFNESS, LEFT: ICD-10-CM

## 2025-01-28 DIAGNOSIS — R29.898 WEAKNESS OF LEFT HIP: ICD-10-CM

## 2025-01-28 DIAGNOSIS — M25.552 PAIN IN LEFT HIP: ICD-10-CM

## 2025-01-28 DIAGNOSIS — M25.552 LEFT HIP PAIN: ICD-10-CM

## 2025-01-28 DIAGNOSIS — Z47.89 ORTHOPEDIC AFTERCARE: Primary | ICD-10-CM

## 2025-01-28 DIAGNOSIS — S73.192D OTHER SPRAIN OF LEFT HIP, SUBSEQUENT ENCOUNTER: ICD-10-CM

## 2025-01-28 PROBLEM — Z71.3 DIETARY COUNSELING AND SURVEILLANCE: Status: ACTIVE | Noted: 2025-01-28

## 2025-01-28 PROBLEM — E66.811 OBESITY (BMI 30.0-34.9): Status: ACTIVE | Noted: 2025-01-28

## 2025-01-28 PROCEDURE — 97140 MANUAL THERAPY 1/> REGIONS: CPT | Mod: GP | Performed by: PHYSICAL THERAPIST

## 2025-01-28 PROCEDURE — 97110 THERAPEUTIC EXERCISES: CPT | Mod: GP | Performed by: PHYSICAL THERAPIST

## 2025-01-28 ASSESSMENT — PAIN - FUNCTIONAL ASSESSMENT: PAIN_FUNCTIONAL_ASSESSMENT: 0-10

## 2025-01-28 ASSESSMENT — PAIN SCALES - GENERAL: PAINLEVEL_OUTOF10: 1

## 2025-01-28 NOTE — PROGRESS NOTES
"  Physical Therapy  Physical Therapy Treatment Note    Patient Name: Jessica Perez  MRN: [id number]  Today's Date: 1/28/2025  Time Calculation  Start Time: 1300  Stop Time: 1400  Time Calculation (min): 60 min    Insurance:  Visit number: 5 of 50 (9 total)  Authorization info: no auth and reset 1/1/2025           Insurance Type: [place] (no vaso)    Current Problem  1. Orthopedic aftercare        2. Other sprain of left hip, subsequent encounter  Follow Up In Physical Therapy      3. Encounter for other orthopedic aftercare  Follow Up In Physical Therapy      4. Pain in left hip  Follow Up In Physical Therapy      5. Left hip pain        6. Hip stiffness, left        7. Weakness of left hip                    Precautions  No active ER> 20 degrees x 3 weeks, 50% WB with crutches, and Avoid hip flexion isometrics x 4 weeks    General  Reason for Referral: Left Hip Arthroscopy; Labral Repair; Rim Trim  Referred By: Dr. Errol CHAMBERS  DOS: 12/19/2024  Days post-op: 40  PO: 5 weeks 5 days    Pain  Pain Assessment: 0-10  0-10 (Numeric) Pain Score: 1  Pain Type: Surgical pain    Subjective:   Pt arrived to therapy denying any major functional changes. Pt reports she did her PT exercises this morning and is feeling better      Performing HEP?: Yes      Objective:   HIP    Hip PROM *post MT     L hip IR (prone): 25  L hip flexion:100*  Treatment Performed:  Therapeutic Exercise:    25 min  Upright bike x 7'  Hook lying IR 2x20  Prone quad stretch with strap 2x1'  Prone IR/ER 2x20  S/l reverse clams 3x12  Half kneel hip flexor stretch 2x1'  Modified side plank 5x10\"    Therapeutic Activity:      min  NT  Treadmill walking SL x3' DL x3' speed 1.5mph    Manual Therapy:    35 min  STM adductors, glutes, hip flexors, posterior hip rotators  PROM in all planes of motion   Circumduction   Long rolling  Lateral hip distraction grade 2/3   Prone PA with IR/ER     Neuromuscular Re-education:   0  min      Therapeutic Modalities:     " min  Vaso 34 deg min light compression in prone    Other:       min          Assessment:   The focus of the session was strength, ROM, stretching, joint mobilization, and soft tissue massage. The pt demonstrated fair tolerance to the noted exercises today by having improved ROM post session. I still have concerns with slow progression and I have kept her on 1 crutch secondary to antalgic gait. The pt is demonstrated fair progress in skilled rehab at this time. The pt is still limited in overall strength, ROM, flexibility, motor control, balance, gait mechanics, and pain at this time. The pt continues to be a good candidate for skilled PT, in order to further improve strength, ROM, flexibility, motor control, balance, gait mechanics, and pain.         Education: continue HEP as prescribed    Plan:  Progress per protocol     Goals:  Active       PT Problem       PT Goal 1       Start:  12/20/24    Expected End:  06/20/25       Patient will report <4/10 pain in R hip by 4 weeks   PROM R hip flexion 90 deg by 3-4 weeks, 110 deg by 6 weeks, abduction 20 deg by 3 weeks, 40 deg by 6 weeks, ER 20 deg by 3 weeks, and IR 30 deg by 4-6 weeks to demonstrate improved joint mechanics to perform ADLs   LTG 12-16 weeks R hip strength: flexion, abduction, ER/IR, extension, glute max  >4+ to 5/5 MMT or 80% to demonstrate pelvic stability during ADLs and higher level functional tasks   Patient able to perform SL squat without valgus or anterior hip impingement to demonstrate LE biomechanics by 6- 8 weeks   LEFS > 30/80 to demonstrate improved ability to perform ADLs by 6 weeks                           Manny Dalton P.T, D.P.T

## 2025-01-29 ENCOUNTER — APPOINTMENT (OUTPATIENT)
Dept: PRIMARY CARE | Facility: CLINIC | Age: 25
End: 2025-01-29
Payer: COMMERCIAL

## 2025-01-29 VITALS — BODY MASS INDEX: 34.11 KG/M2 | HEIGHT: 69 IN

## 2025-01-29 DIAGNOSIS — E66.811 OBESITY (BMI 30.0-34.9): ICD-10-CM

## 2025-01-29 DIAGNOSIS — Z71.3 DIETARY COUNSELING AND SURVEILLANCE: Primary | ICD-10-CM

## 2025-01-29 PROCEDURE — 97802 MEDICAL NUTRITION INDIV IN: CPT | Performed by: DIETITIAN, REGISTERED

## 2025-01-29 NOTE — PATIENT INSTRUCTIONS
Discussed vitamin D - 1,000-2,000/day is maintenance dose but since level is <30 she could take 5,000/day OTC or talk with doctor about prescription of 50K units weekly for 6-8 weeks.     Encouraged patient to pursue balanced eating.   - Eat the first meal of the day within about 2 hours of waking up. If that seems too early, then be attentive to the first time of the day that there are signs of hunger appearing, and respond to those hunger signals by eating a meal or snack.   - Spread food fairly evenly throughout the day. If it will be longer than 5 hours between meals, consider adding a snack between the meals. Eating at regular intervals can help prevent becoming overly hungry.   - Use the Plate Method to balance the type of food and amount of foods on the plate.   - At snacks, include a food that is a rich source of protein, and include a food from a second food group. Eating food from 2 food groups at a snack can promote more satisfaction than eating a snack that is very small. A snack should be satisfying and help diminish thoughts of hunger until the next meal.   - Spend some time each week planning out what to eat, shop for groceries, and do a little food preparation. Try to create meals that will yield leftovers to save time at another meal. Planning can be one of the most helpful skills for promoting balanced eating.  Suggested setting an alarm for 10 minutes at night and see how much meal prep for the next day she can accomplish in that amount of time.   - Strive to make pleasure part of eating healthy. Also strive for a good relationship with food. This shouldn't be a temporary diet that can only be maintained with strict willpower. Be curious about what tastes good to you and what foods feel good in your body. No food is inherently good or bad, and eating shouldn't promote negative feelings about food. Be kind to yourself as you work on balanced eating.    Utilize the Plate Method at meals in order to  balance meals.   - 1/3-1/2 of the plate full of non-starchy vegetables. These foods contribute very little carbohydrate to the plate, and they add fiber to the meal. Salad, carrots, bell peppers, zucchini, broccoli, cauliflower, asparagus, radishes, carrots and green beans are a few examples of these foods. They can be served cooked or raw.    - 1/4-1/3 of the plate including protein foods. Examples can include meat, nuts, seeds, cheese, cottage cheese, nut butter, fish, seafood, tofu, and edamame.     - 1/4-1/3 of the plate including carbohydrate foods. These foods include grains, fruit, legumes, starchy vegetables, milk and yogurt. Aim to eat at least half of the daily grains as whole grains.

## 2025-01-30 ENCOUNTER — TREATMENT (OUTPATIENT)
Dept: PHYSICAL THERAPY | Facility: HOSPITAL | Age: 25
End: 2025-01-30
Payer: COMMERCIAL

## 2025-01-30 DIAGNOSIS — M25.552 PAIN IN LEFT HIP: ICD-10-CM

## 2025-01-30 DIAGNOSIS — S73.192D OTHER SPRAIN OF LEFT HIP, SUBSEQUENT ENCOUNTER: ICD-10-CM

## 2025-01-30 DIAGNOSIS — Z47.89 ENCOUNTER FOR OTHER ORTHOPEDIC AFTERCARE: ICD-10-CM

## 2025-01-30 PROCEDURE — 97140 MANUAL THERAPY 1/> REGIONS: CPT | Mod: GP

## 2025-01-30 PROCEDURE — 97110 THERAPEUTIC EXERCISES: CPT | Mod: GP

## 2025-01-30 ASSESSMENT — PAIN SCALES - GENERAL: PAINLEVEL_OUTOF10: 2

## 2025-01-30 ASSESSMENT — PAIN - FUNCTIONAL ASSESSMENT: PAIN_FUNCTIONAL_ASSESSMENT: 0-10

## 2025-01-30 NOTE — PROGRESS NOTES
"  Physical Therapy  Physical Therapy Treatment Note    Patient Name: Jessica Perez  MRN: [id number]  Today's Date: 1/30/2025  Time Calculation  Start Time: 1330  Stop Time: 1430  Time Calculation (min): 60 min    Insurance:  Visit number: 6 of 50 (9 total)  Authorization info: no auth and reset 1/1/2025           Insurance Type: [place] (no vaso)    Current Problem  1. Other sprain of left hip, subsequent encounter  Follow Up In Physical Therapy      2. Encounter for other orthopedic aftercare  Follow Up In Physical Therapy      3. Pain in left hip  Follow Up In Physical Therapy          Precautions  No jumping/running    General  Reason for Referral: Left Hip Arthroscopy; Labral Repair; Rim Trim  Referred By: Dr. Errol CHAMBERS  DOS: 12/19/2024  Days post-op: 42  PO: 5 weeks 5 days    Pain       Subjective:   Pt reports her hip is sore entering clinic. She did exercises this morning which helped with soreness but once she say down at her desk it returned. No issues after last session.       Performing HEP?: Yes      Objective:   HIP    Hip PROM *post MT     L hip IR (prone): 25  L hip flexion:100*  Treatment Performed:  Therapeutic Exercise:    25 min  Upright bike x 7'  Hook lying IR 2x20  Prone quad stretch with strap 2x1'  Prone IR/ER 2x20  Seated hip AROM IR/ER 2 x 10   SLS 3 x 30\"   Gait on treadmill 2 minutes SL   S/l reverse clams 3x12  Half kneel hip flexor stretch 2x1'  Modified side plank 5x10\"-NT    Therapeutic Activity:      min  NT  Treadmill walking SL x3' DL x3' speed 1.5mph    Manual Therapy:    35 min  STM adductors, glutes, hip flexors, posterior hip rotators  LAD  Circumduction   Long rolling  Lateral hip distraction grade 2/3   MWM belt IR  Prone PA with IR/ER     Neuromuscular Re-education:   0  min      Therapeutic Modalities:     min  Vaso 34 deg min light compression in prone    Other:       min          Assessment:   The focus of today's session was strengthening, flexibility/ROM , and joint " mobilizations. Patient appropriately challenged by therapeutic exercise and was able to complete without increased pain. Focused on PROM and AROM exercises to address continued capsular restrictions. Pt demonstrates hip circumduction and early knee flexion moment with gait without assistive device- pt to continue using one crutch. The patient is still limited in overall strength, flexibility, motor control and pain  at this time. Patient progressing well overall with therapy and continues to require skilled care to address motor control, strength, flexibility and functional deficits.            Education: continue HEP as prescribed    Plan:  Progress per protocol     Goals:  Active       PT Problem       PT Goal 1       Start:  12/20/24    Expected End:  06/20/25       Patient will report <4/10 pain in R hip by 4 weeks   PROM R hip flexion 90 deg by 3-4 weeks, 110 deg by 6 weeks, abduction 20 deg by 3 weeks, 40 deg by 6 weeks, ER 20 deg by 3 weeks, and IR 30 deg by 4-6 weeks to demonstrate improved joint mechanics to perform ADLs   LTG 12-16 weeks R hip strength: flexion, abduction, ER/IR, extension, glute max  >4+ to 5/5 MMT or 80% to demonstrate pelvic stability during ADLs and higher level functional tasks   Patient able to perform SL squat without valgus or anterior hip impingement to demonstrate LE biomechanics by 6- 8 weeks   LEFS > 30/80 to demonstrate improved ability to perform ADLs by 6 weeks

## 2025-01-31 ENCOUNTER — APPOINTMENT (OUTPATIENT)
Dept: ORTHOPEDIC SURGERY | Facility: HOSPITAL | Age: 25
End: 2025-01-31
Payer: COMMERCIAL

## 2025-01-31 DIAGNOSIS — S73.192D TEAR OF LEFT ACETABULAR LABRUM, SUBSEQUENT ENCOUNTER: Primary | ICD-10-CM

## 2025-01-31 PROCEDURE — 99211 OFF/OP EST MAY X REQ PHY/QHP: CPT | Performed by: PHYSICIAN ASSISTANT

## 2025-02-04 ENCOUNTER — TREATMENT (OUTPATIENT)
Dept: PHYSICAL THERAPY | Facility: HOSPITAL | Age: 25
End: 2025-02-04
Payer: COMMERCIAL

## 2025-02-04 DIAGNOSIS — M25.552 PAIN IN LEFT HIP: ICD-10-CM

## 2025-02-04 DIAGNOSIS — S73.192D OTHER SPRAIN OF LEFT HIP, SUBSEQUENT ENCOUNTER: ICD-10-CM

## 2025-02-04 DIAGNOSIS — Z47.89 ENCOUNTER FOR OTHER ORTHOPEDIC AFTERCARE: ICD-10-CM

## 2025-02-04 PROCEDURE — 97110 THERAPEUTIC EXERCISES: CPT | Mod: GP | Performed by: PHYSICAL THERAPIST

## 2025-02-04 PROCEDURE — 97140 MANUAL THERAPY 1/> REGIONS: CPT | Mod: GP | Performed by: PHYSICAL THERAPIST

## 2025-02-04 ASSESSMENT — PAIN - FUNCTIONAL ASSESSMENT: PAIN_FUNCTIONAL_ASSESSMENT: 0-10

## 2025-02-04 ASSESSMENT — PAIN SCALES - GENERAL: PAINLEVEL_OUTOF10: 1

## 2025-02-04 NOTE — PROGRESS NOTES
"  Physical Therapy  Physical Therapy Treatment Note    Patient Name: Jessica Perez  MRN: [id number]  Today's Date: 2/4/2025  Time Calculation  Start Time: 1100  Stop Time: 1200  Time Calculation (min): 60 min    Insurance:  Visit number: 7 of 50 (10 total)  Authorization info: no auth and reset 1/1/2025           Insurance Type: [place] (no vaso)    Current Problem  1. Other sprain of left hip, subsequent encounter  Follow Up In Physical Therapy      2. Encounter for other orthopedic aftercare  Follow Up In Physical Therapy      3. Pain in left hip  Follow Up In Physical Therapy          Precautions  No jumping/running    General  Reason for Referral: Left Hip Arthroscopy; Labral Repair; Rim Trim  Referred By: Dr. Errol CHAMBERS  DOS: 12/19/2024  Days post-op: 47  PO: 5 weeks 5 days    Pain  Pain Assessment: 0-10  0-10 (Numeric) Pain Score: 1    Subjective:   Pt arrive to therapy reporting she is feeling slightly better than previous session. Pt states she did not perform her HEP as much over the       Performing HEP?: Yes      Objective:   HIP    Hip PROM *post MT     L hip IR (prone): 25  L hip flexion:100*  Treatment Performed:  Therapeutic Exercise:    30 min  Upright bike x 7'  Hook lying IR 2x20  Prone quad stretch with strap 2x1'  Prone IR/ER 2x20  BFR 70%  Hook lying bridge  Standing hip abduction  Standing hip extension   NT  SLS 3 x 30\"   Gait on treadmill 2 minutes SL   S/l reverse clams 3x12  Half kneel hip flexor stretch 2x1'  Modified side plank 5x10\"-NT    Therapeutic Activity:      min  NT  Treadmill walking SL x3' DL x3' speed 1.5mph    Manual Therapy:    30 min  STM adductors, glutes, hip flexors, posterior hip rotators  LAD  Circumduction   Long rolling  Lateral hip distraction grade 2/3   MWM belt IR  Prone PA with IR/ER     Neuromuscular Re-education:   0  min      Therapeutic Modalities:     min  Vaso 34 deg min light compression in prone    Other:       min          Assessment:   The focus of " today's session was strengthening, flexibility/ROM , and joint mobilizations. Patient appropriately challenged by therapeutic exercise and was able to complete without increased pain. Focused on PROM and AROM exercises to address continued capsular restrictions. Pt demonstrates hip circumduction and early knee flexion moment with gait without assistive device- pt to continue using one crutch. The patient is still limited in overall strength, flexibility, motor control and pain  at this time. Patient progressing well overall with therapy and continues to require skilled care to address motor control, strength, flexibility and functional deficits.            Education: continue HEP as prescribed    Plan:  Progress per protocol     Goals:  Active       PT Problem       PT Goal 1       Start:  12/20/24    Expected End:  06/20/25       Patient will report <4/10 pain in R hip by 4 weeks   PROM R hip flexion 90 deg by 3-4 weeks, 110 deg by 6 weeks, abduction 20 deg by 3 weeks, 40 deg by 6 weeks, ER 20 deg by 3 weeks, and IR 30 deg by 4-6 weeks to demonstrate improved joint mechanics to perform ADLs   LTG 12-16 weeks R hip strength: flexion, abduction, ER/IR, extension, glute max  >4+ to 5/5 MMT or 80% to demonstrate pelvic stability during ADLs and higher level functional tasks   Patient able to perform SL squat without valgus or anterior hip impingement to demonstrate LE biomechanics by 6- 8 weeks   LEFS > 30/80 to demonstrate improved ability to perform ADLs by 6 weeks

## 2025-02-06 ENCOUNTER — TREATMENT (OUTPATIENT)
Dept: PHYSICAL THERAPY | Facility: HOSPITAL | Age: 25
End: 2025-02-06
Payer: COMMERCIAL

## 2025-02-06 DIAGNOSIS — S73.192D OTHER SPRAIN OF LEFT HIP, SUBSEQUENT ENCOUNTER: ICD-10-CM

## 2025-02-06 DIAGNOSIS — M25.552 PAIN IN LEFT HIP: ICD-10-CM

## 2025-02-06 DIAGNOSIS — Z47.89 ENCOUNTER FOR OTHER ORTHOPEDIC AFTERCARE: ICD-10-CM

## 2025-02-06 DIAGNOSIS — M25.552 LEFT HIP PAIN: Primary | ICD-10-CM

## 2025-02-06 DIAGNOSIS — R29.898 WEAKNESS OF LEFT HIP: ICD-10-CM

## 2025-02-06 DIAGNOSIS — M25.652 HIP STIFFNESS, LEFT: ICD-10-CM

## 2025-02-06 PROCEDURE — 97110 THERAPEUTIC EXERCISES: CPT | Mod: GP | Performed by: PHYSICAL THERAPIST

## 2025-02-06 PROCEDURE — 97140 MANUAL THERAPY 1/> REGIONS: CPT | Mod: GP | Performed by: PHYSICAL THERAPIST

## 2025-02-06 ASSESSMENT — PAIN - FUNCTIONAL ASSESSMENT: PAIN_FUNCTIONAL_ASSESSMENT: 0-10

## 2025-02-06 ASSESSMENT — PAIN SCALES - GENERAL: PAINLEVEL_OUTOF10: 0 - NO PAIN

## 2025-02-06 NOTE — PROGRESS NOTES
"  Physical Therapy  Physical Therapy Treatment Note    Patient Name: Jessica Perez  MRN: [id number]  Today's Date: 2/6/2025  Time Calculation  Start Time: 1330  Stop Time: 1430  Time Calculation (min): 60 min    Insurance:  Visit number: 8 of 50 (11 total)  Authorization info: no auth and reset 1/1/2025           Insurance Type: [place] (no vaso)    Current Problem  1. Left hip pain        2. Other sprain of left hip, subsequent encounter  Follow Up In Physical Therapy      3. Encounter for other orthopedic aftercare  Follow Up In Physical Therapy      4. Pain in left hip  Follow Up In Physical Therapy      5. Hip stiffness, left        6. Weakness of left hip            Precautions  No jumping/running    General  Reason for Referral: Left Hip Arthroscopy; Labral Repair; Rim Trim  Referred By: Dr. Errol CHAMBERS  DOS: 12/19/2024  Days post-op: 49  PO: 6 weeks    Pain  Pain Assessment: 0-10  0-10 (Numeric) Pain Score: 0 - No pain    Subjective:   Pt arrived to therapy reporting she is feeling better than previous session. Pt reports less pain and improved mobility      Performing HEP?: Yes      Objective:   HIP    Hip PROM *post MT     L hip IR (prone): 30  L hip flexion:104*  Treatment Performed:  Therapeutic Exercise:    35 min  Upright bike x 7'  Hook lying IR 2x20  Prone quad stretch with strap 2x1'  Prone IR/ER 2x20  BFR 70%  Hook lying bridge  Standing hip abduction  Standing hip extension   NT  SLS 3 x 30\"   Gait on treadmill 2 minutes SL   S/l reverse clams 3x12  Half kneel hip flexor stretch 2x1'  Modified side plank 5x10\"-NT    Therapeutic Activity:      min  NT  Treadmill walking SL x3' DL x3' speed 1.5mph    Manual Therapy:    25 min  STM adductors, glutes, hip flexors, posterior hip rotators  LAD  Circumduction   Long rolling  Lateral hip distraction grade 2/3   MWM belt IR  Prone PA with IR/ER     Neuromuscular Re-education:   0  min      Therapeutic Modalities:     min  Vaso 34 deg min light compression " in prone    Other:       min          Assessment:   The focus of today's session was strengthening, flexibility/ROM , and joint mobilizations. Patient appropriately challenged by therapeutic exercise and was able to complete without increased pain. Focused on PROM and AROM exercises to address continued capsular restrictions. Pt demonstrates hip circumduction and early knee flexion moment with gait without assistive device- pt to continue using one crutch. The patient is still limited in overall strength, flexibility, motor control and pain  at this time. Patient progressing well overall with therapy and continues to require skilled care to address motor control, strength, flexibility and functional deficits.            Education: continue HEP as prescribed    Plan:  Progress per protocol     Goals:  Active       PT Problem       PT Goal 1       Start:  12/20/24    Expected End:  06/20/25       Patient will report <4/10 pain in R hip by 4 weeks   PROM R hip flexion 90 deg by 3-4 weeks, 110 deg by 6 weeks, abduction 20 deg by 3 weeks, 40 deg by 6 weeks, ER 20 deg by 3 weeks, and IR 30 deg by 4-6 weeks to demonstrate improved joint mechanics to perform ADLs   LTG 12-16 weeks R hip strength: flexion, abduction, ER/IR, extension, glute max  >4+ to 5/5 MMT or 80% to demonstrate pelvic stability during ADLs and higher level functional tasks   Patient able to perform SL squat without valgus or anterior hip impingement to demonstrate LE biomechanics by 6- 8 weeks   LEFS > 30/80 to demonstrate improved ability to perform ADLs by 6 weeks

## 2025-02-11 ENCOUNTER — TREATMENT (OUTPATIENT)
Dept: PHYSICAL THERAPY | Facility: HOSPITAL | Age: 25
End: 2025-02-11
Payer: COMMERCIAL

## 2025-02-11 DIAGNOSIS — M25.552 PAIN IN LEFT HIP: ICD-10-CM

## 2025-02-11 DIAGNOSIS — Z47.89 ENCOUNTER FOR OTHER ORTHOPEDIC AFTERCARE: ICD-10-CM

## 2025-02-11 DIAGNOSIS — S73.192D OTHER SPRAIN OF LEFT HIP, SUBSEQUENT ENCOUNTER: ICD-10-CM

## 2025-02-11 PROCEDURE — 97140 MANUAL THERAPY 1/> REGIONS: CPT | Mod: GP | Performed by: PHYSICAL THERAPIST

## 2025-02-11 PROCEDURE — 97110 THERAPEUTIC EXERCISES: CPT | Mod: GP | Performed by: PHYSICAL THERAPIST

## 2025-02-11 NOTE — PROGRESS NOTES
"  Physical Therapy  Physical Therapy Treatment Note    Patient Name: Jessica Perez  MRN: [id number]  Today's Date: 2/11/2025  Time Calculation  Start Time: 1630  Stop Time: 1730  Time Calculation (min): 60 min    Insurance:  Visit number: 9 of 50 (12 total)  Authorization info: no auth and reset 1/1/2025           Insurance Type: [place] (no vaso)    Current Problem  1. Other sprain of left hip, subsequent encounter  Follow Up In Physical Therapy      2. Encounter for other orthopedic aftercare  Follow Up In Physical Therapy      3. Pain in left hip  Follow Up In Physical Therapy          Precautions  No jumping/running    General  Reason for Referral: Left Hip Arthroscopy; Labral Repair; Rim Trim  Referred By: Dr. Errol CHAMBERS  DOS: 12/19/2024  Days post-op: 54  PO: 6 weeks    Pain  Pain Assessment: 0-10  0-10 (Numeric) Pain Score: 2    Subjective:   Pt arrived to therapy no longer utilizing crutches. Pt states this weekend she felt very good but feels stiff and sore today. Pt reports compliance with HEP but not to freq prescribed      Performing HEP?: Yes      Objective:   HIP    Hip PROM *post MT     L hip IR (prone): 30  L hip flexion:104*  Treatment Performed:  Therapeutic Exercise:    30 min  Upright bike x 7'  SL bridge in figure 4 position 3x10 each  S/l clamshells 3x10  Prone quad stretch with strap 2x1'  Prone IR/ER 2x20  SL hip hike 4\" step 3x10  Resisted side stepping OTB loop  NT  SLS 3 x 30\"   Gait on treadmill 2 minutes SL   S/l reverse clams 3x12  Half kneel hip flexor stretch 2x1'  Modified side plank 5x10\"-NT    Therapeutic Activity:      min  NT  Treadmill walking SL x3' DL x3' speed 1.5mph    Manual Therapy:    30 min  STM adductors, glutes, hip flexors, posterior hip rotators  LAD  Circumduction   Long rolling  Lateral hip distraction grade 2/3   MWM belt IR  Prone PA with IR/ER     Neuromuscular Re-education:   0  min      Therapeutic Modalities:     min  Vaso 34 deg min light compression in " prone    Other:       min          Assessment:   The focus of today's session was strengthening, flexibility/ROM , and joint mobilizations. Patient appropriately challenged by therapeutic exercise and was able to complete without increased pain. Focused on PROM and AROM exercises to address continued capsular restrictions. Pt demonstrates hip circumduction and early knee flexion moment with gait without assistive device- pt to continue using one crutch. The patient is still limited in overall strength, flexibility, motor control and pain  at this time. Patient progressing well overall with therapy and continues to require skilled care to address motor control, strength, flexibility and functional deficits.            Education: continue HEP as prescribed    Plan:  Progress per protocol     Goals:  Active       PT Problem       PT Goal 1       Start:  12/20/24    Expected End:  06/20/25       Patient will report <4/10 pain in R hip by 4 weeks   PROM R hip flexion 90 deg by 3-4 weeks, 110 deg by 6 weeks, abduction 20 deg by 3 weeks, 40 deg by 6 weeks, ER 20 deg by 3 weeks, and IR 30 deg by 4-6 weeks to demonstrate improved joint mechanics to perform ADLs   LTG 12-16 weeks R hip strength: flexion, abduction, ER/IR, extension, glute max  >4+ to 5/5 MMT or 80% to demonstrate pelvic stability during ADLs and higher level functional tasks   Patient able to perform SL squat without valgus or anterior hip impingement to demonstrate LE biomechanics by 6- 8 weeks   LEFS > 30/80 to demonstrate improved ability to perform ADLs by 6 weeks

## 2025-02-12 ASSESSMENT — PAIN - FUNCTIONAL ASSESSMENT: PAIN_FUNCTIONAL_ASSESSMENT: 0-10

## 2025-02-12 ASSESSMENT — PAIN SCALES - GENERAL: PAINLEVEL_OUTOF10: 2

## 2025-02-13 ENCOUNTER — TREATMENT (OUTPATIENT)
Dept: PHYSICAL THERAPY | Facility: HOSPITAL | Age: 25
End: 2025-02-13
Payer: COMMERCIAL

## 2025-02-13 DIAGNOSIS — Z47.89 ENCOUNTER FOR OTHER ORTHOPEDIC AFTERCARE: ICD-10-CM

## 2025-02-13 DIAGNOSIS — M25.552 PAIN IN LEFT HIP: ICD-10-CM

## 2025-02-13 DIAGNOSIS — S73.192D OTHER SPRAIN OF LEFT HIP, SUBSEQUENT ENCOUNTER: ICD-10-CM

## 2025-02-13 DIAGNOSIS — M25.652 HIP STIFFNESS, LEFT: ICD-10-CM

## 2025-02-13 DIAGNOSIS — M25.552 LEFT HIP PAIN: Primary | ICD-10-CM

## 2025-02-13 DIAGNOSIS — Z47.89 ORTHOPEDIC AFTERCARE: ICD-10-CM

## 2025-02-13 DIAGNOSIS — R29.898 WEAKNESS OF LEFT HIP: ICD-10-CM

## 2025-02-13 PROCEDURE — 97110 THERAPEUTIC EXERCISES: CPT | Mod: GP | Performed by: PHYSICAL THERAPIST

## 2025-02-13 PROCEDURE — 97140 MANUAL THERAPY 1/> REGIONS: CPT | Mod: GP | Performed by: PHYSICAL THERAPIST

## 2025-02-13 NOTE — PROGRESS NOTES
"  Physical Therapy  Physical Therapy Treatment Note    Patient Name: Jessica Perez  MRN: [id number]  Today's Date: 2/13/2025  Time Calculation  Start Time: 1630  Stop Time: 1730  Time Calculation (min): 60 min    Insurance:  Visit number: 10 of 50 (13 total)  Authorization info: no auth and reset 1/1/2025           Insurance Type: [place] (no vaso)    Current Problem  1. Left hip pain        2. Other sprain of left hip, subsequent encounter  Follow Up In Physical Therapy      3. Encounter for other orthopedic aftercare  Follow Up In Physical Therapy      4. Pain in left hip  Follow Up In Physical Therapy      5. Hip stiffness, left        6. Weakness of left hip        7. Orthopedic aftercare            Precautions  No jumping/running    General  Reason for Referral: Left Hip Arthroscopy; Labral Repair; Rim Trim  Referred By: Dr. Errol CHAMBERS  DOS: 12/19/2024  Days post-op: 56  PO: 6 weeks    Pain  Pain Assessment: 0-10  0-10 (Numeric) Pain Score: 1    Subjective:   Pt arrived to therapy no longer utilizing crutches. Pt states this weekend she felt very good but feels stiff and sore today. Pt reports compliance with HEP but not to freq prescribed      Performing HEP?: Yes      Objective:   HIP    Hip PROM *post MT     L hip IR (prone): 30  L hip flexion:105*  Treatment Performed:  Therapeutic Exercise:    30 min  Upright bike x 7'  Prone IR/ER 2x20  BFR 80% 30-15-15-15  Hook lying bridge  Standing hip extension   Standing hip abduction   NT  SLS 3 x 30\"   Gait on treadmill 2 minutes SL   S/l reverse clams 3x12  Half kneel hip flexor stretch 2x1'  Modified side plank 5x10\"-NT    Therapeutic Activity:      min  NT  Treadmill walking SL x3' DL x3' speed 1.5mph    Manual Therapy:    30 min  STM adductors, glutes, hip flexors, posterior hip rotators  LAD  Circumduction   Long rolling  Lateral hip distraction grade 2/3   MWM belt IR  Prone PA with IR/ER     Neuromuscular Re-education:   0  min      Therapeutic Modalities: "     min  Vaso 34 deg min light compression in prone NT    Other:       min          Assessment:   The focus of the session was strength, ROM, stretching, joint mobilization, and soft tissue massage. The pt demonstrated good tolerance to the noted exercises today by demonstrating improvements in ROM this week compared to previous week. The pt is demonstrated good progress in skilled rehab at this time. The pt is still limited in overall strength, ROM, flexibility, motor control, balance, gait mechanics, effusion, and pain at this time. The pt continues to be a good candidate for skilled PT, in order to further improve strength, ROM, flexibility, motor control, balance, gait mechanics, effusion, and pain.             Education: continue HEP as prescribed    Plan:  Progress per protocol     Goals:  Active       PT Problem       PT Goal 1       Start:  12/20/24    Expected End:  06/20/25       Patient will report <4/10 pain in R hip by 4 weeks   PROM R hip flexion 90 deg by 3-4 weeks, 110 deg by 6 weeks, abduction 20 deg by 3 weeks, 40 deg by 6 weeks, ER 20 deg by 3 weeks, and IR 30 deg by 4-6 weeks to demonstrate improved joint mechanics to perform ADLs   LTG 12-16 weeks R hip strength: flexion, abduction, ER/IR, extension, glute max  >4+ to 5/5 MMT or 80% to demonstrate pelvic stability during ADLs and higher level functional tasks   Patient able to perform SL squat without valgus or anterior hip impingement to demonstrate LE biomechanics by 6- 8 weeks   LEFS > 30/80 to demonstrate improved ability to perform ADLs by 6 weeks

## 2025-02-14 ASSESSMENT — PAIN SCALES - GENERAL: PAINLEVEL_OUTOF10: 1

## 2025-02-14 ASSESSMENT — PAIN - FUNCTIONAL ASSESSMENT: PAIN_FUNCTIONAL_ASSESSMENT: 0-10

## 2025-02-18 ENCOUNTER — TREATMENT (OUTPATIENT)
Dept: PHYSICAL THERAPY | Facility: HOSPITAL | Age: 25
End: 2025-02-18
Payer: COMMERCIAL

## 2025-02-18 DIAGNOSIS — Z47.89 ENCOUNTER FOR OTHER ORTHOPEDIC AFTERCARE: ICD-10-CM

## 2025-02-18 DIAGNOSIS — M25.652 HIP STIFFNESS, LEFT: Primary | ICD-10-CM

## 2025-02-18 DIAGNOSIS — Z47.89 ORTHOPEDIC AFTERCARE: ICD-10-CM

## 2025-02-18 DIAGNOSIS — M25.552 PAIN IN LEFT HIP: ICD-10-CM

## 2025-02-18 DIAGNOSIS — S73.192D OTHER SPRAIN OF LEFT HIP, SUBSEQUENT ENCOUNTER: ICD-10-CM

## 2025-02-18 DIAGNOSIS — R29.898 WEAKNESS OF LEFT HIP: ICD-10-CM

## 2025-02-18 PROCEDURE — 97110 THERAPEUTIC EXERCISES: CPT | Mod: GP | Performed by: PHYSICAL THERAPIST

## 2025-02-18 PROCEDURE — 97140 MANUAL THERAPY 1/> REGIONS: CPT | Mod: GP | Performed by: PHYSICAL THERAPIST

## 2025-02-18 ASSESSMENT — PAIN SCALES - GENERAL: PAINLEVEL_OUTOF10: 0 - NO PAIN

## 2025-02-18 ASSESSMENT — PAIN - FUNCTIONAL ASSESSMENT: PAIN_FUNCTIONAL_ASSESSMENT: 0-10

## 2025-02-18 NOTE — PROGRESS NOTES
"  Physical Therapy  Physical Therapy Treatment Note    Patient Name: Jessica Perez  MRN: [id number]  Today's Date: 2/13/2025  Time Calculation  Start Time: 1300  Stop Time: 1355  Time Calculation (min): 55 min    Insurance:  Visit number: 11 of 50 (14 total)  Authorization info: no auth and reset 1/1/2025           Insurance Type: [place] (no vaso)    Current Problem  1. Hip stiffness, left        2. Other sprain of left hip, subsequent encounter  Follow Up In Physical Therapy      3. Encounter for other orthopedic aftercare  Follow Up In Physical Therapy      4. Pain in left hip  Follow Up In Physical Therapy      5. Weakness of left hip        6. Orthopedic aftercare            Precautions  No jumping/running    General  Reason for Referral: Left Hip Arthroscopy; Labral Repair; Rim Trim  Referred By: Dr. Errol CHAMBERS  DOS: 12/19/2024  Days post-op: 61  PO: 8 weeks    Pain  Pain Assessment: 0-10  0-10 (Numeric) Pain Score: 0 - No pain    Subjective:   Pt arrived to therapy reporting she is doing well. Pt states she is a little stiff because she walked a lot yesterday      Performing HEP?: Yes      Objective:   HIP    Hip PROM *post MT     L hip IR (prone): 30  L hip flexion:105*  Treatment Performed:  Therapeutic Exercise:    30 min  Upright bike x 7'  Prone IR/ER 2x20  Prone quad stretch 1'x2  S/l reverse clams 3x12  S/l hip abduction 3x10  Resisted side stepping red TB loop 3x10  BW squats to plinth to 60 deg 3x10  TG Leg Press seat 0 1 slightly Staggered 3x10   Matrix Knee extension 25# 3x12  Half kneeling hip flexor stretch  NT  SLS 3 x 30\"   Gait on treadmill 2 minutes SL   S/l reverse clams 3x12  Half kneel hip flexor stretch 2x1'  Modified side plank 5x10\"-NT    Therapeutic Activity:      min  NT  Treadmill walking SL x3' DL x3' speed 1.5mph    Manual Therapy:    25 min  STM adductors, glutes, hip flexors, posterior hip rotators  LAD  Circumduction   Long rolling  Lateral hip distraction grade 2/3   MWM belt " IR  Prone PA with IR/ER     Neuromuscular Re-education:   0  min      Therapeutic Modalities:     min  Vaso 34 deg min light compression in prone NT    Other:       min          Assessment:   The focus of the session was strength, ROM, stretching, joint mobilization, and soft tissue massage. The pt demonstrated good tolerance to the noted exercises today by demonstrating improvements in ROM this week compared to previous week. The pt is demonstrated good progress in skilled rehab at this time. The pt is still limited in overall strength, ROM, flexibility, motor control, balance, gait mechanics, effusion, and pain at this time. The pt continues to be a good candidate for skilled PT, in order to further improve strength, ROM, flexibility, motor control, balance, gait mechanics, effusion, and pain.             Education: continue HEP as prescribed    Plan:  Progress per protocol     Goals:  Active       PT Problem       PT Goal 1       Start:  12/20/24    Expected End:  06/20/25       Patient will report <4/10 pain in R hip by 4 weeks   PROM R hip flexion 90 deg by 3-4 weeks, 110 deg by 6 weeks, abduction 20 deg by 3 weeks, 40 deg by 6 weeks, ER 20 deg by 3 weeks, and IR 30 deg by 4-6 weeks to demonstrate improved joint mechanics to perform ADLs   LTG 12-16 weeks R hip strength: flexion, abduction, ER/IR, extension, glute max  >4+ to 5/5 MMT or 80% to demonstrate pelvic stability during ADLs and higher level functional tasks   Patient able to perform SL squat without valgus or anterior hip impingement to demonstrate LE biomechanics by 6- 8 weeks   LEFS > 30/80 to demonstrate improved ability to perform ADLs by 6 weeks

## 2025-02-20 ENCOUNTER — TREATMENT (OUTPATIENT)
Dept: PHYSICAL THERAPY | Facility: HOSPITAL | Age: 25
End: 2025-02-20
Payer: COMMERCIAL

## 2025-02-20 DIAGNOSIS — S73.192D OTHER SPRAIN OF LEFT HIP, SUBSEQUENT ENCOUNTER: ICD-10-CM

## 2025-02-20 DIAGNOSIS — Z47.89 ENCOUNTER FOR OTHER ORTHOPEDIC AFTERCARE: ICD-10-CM

## 2025-02-20 DIAGNOSIS — M25.552 PAIN IN LEFT HIP: ICD-10-CM

## 2025-02-20 PROCEDURE — 97110 THERAPEUTIC EXERCISES: CPT | Mod: GP | Performed by: PHYSICAL THERAPIST

## 2025-02-20 PROCEDURE — 97140 MANUAL THERAPY 1/> REGIONS: CPT | Mod: GP | Performed by: PHYSICAL THERAPIST

## 2025-02-21 ASSESSMENT — PAIN SCALES - GENERAL: PAINLEVEL_OUTOF10: 0 - NO PAIN

## 2025-02-21 ASSESSMENT — PAIN - FUNCTIONAL ASSESSMENT: PAIN_FUNCTIONAL_ASSESSMENT: 0-10

## 2025-02-21 NOTE — PROGRESS NOTES
"  Physical Therapy  Physical Therapy Treatment Note    Patient Name: Jessica Perez  MRN: [id number]  Today's Date: 2/20/2025  Time Calculation  Start Time: 1625  Stop Time: 1730  Time Calculation (min): 65 min    Insurance:  Visit number: 12 of 50 (15 total)  Authorization info: no auth and reset 1/1/2025           Insurance Type: [place] (no vaso)    Current Problem  1. Other sprain of left hip, subsequent encounter  Follow Up In Physical Therapy      2. Encounter for other orthopedic aftercare  Follow Up In Physical Therapy      3. Pain in left hip  Follow Up In Physical Therapy          Precautions  No jumping/running    General  Reason for Referral: Left Hip Arthroscopy; Labral Repair; Rim Trim  Referred By: Dr. Errol CHAMBERS  DOS: 12/19/2024  Days post-op: 63  PO: 9 weeks    Pain  Pain Assessment: 0-10  0-10 (Numeric) Pain Score: 0 - No pain    Subjective:   Pt arrived to therapy reporting she is feeling well overall. Pt reports she feels hip ROM is improving      Performing HEP?: Yes      Objective:   HIP    Hip PROM *post MT     L hip IR (prone): 30  L hip flexion:105*  Treatment Performed:    Therapeutic Exercise:    35 min  Upright bike x 7'  Prone IR/ER 2x20  Hip to  Prone quad stretch 1'x2  S/l reverse clams 3x12  BFR 160mmHG 30-15-15-15  Hook lying bridge  Standing hip abduction  Standing hip extension      NT  S/l hip abduction 3x10  Resisted side stepping red TB loop 3x10  BW squats to plinth to 60 deg 3x10  TG Leg Press seat 0 1 slightly Staggered 3x10   Matrix Knee extension 25# 3x12  Half kneeling hip flexor stretch  SLS 3 x 30\"   Gait on treadmill 2 minutes SL   S/l reverse clams 3x12  Half kneel hip flexor stretch 2x1'  Modified side plank 5x10\"-NT    Therapeutic Activity:      min  NT  Treadmill walking SL x3' DL x3' speed 1.5mph    Manual Therapy:    25 min  STM adductors, glutes, hip flexors, posterior hip rotators  LAD  Circumduction   Long rolling  Lateral hip distraction grade 2/3   MWM belt " IR  Prone PA with IR/ER     Neuromuscular Re-education:   0  min      Therapeutic Modalities:     min  Vaso 34 deg min light compression in prone NT    Other:       min          Assessment:   The focus of the session was strength, ROM, stretching, joint mobilization, and soft tissue massage. The pt demonstrated good tolerance to the noted exercises today by demonstrating improvements in ROM this week compared to previous week. The pt is demonstrated good progress in skilled rehab at this time. The pt is still limited in overall strength, ROM, flexibility, motor control, balance, gait mechanics, effusion, and pain at this time. The pt continues to be a good candidate for skilled PT, in order to further improve strength, ROM, flexibility, motor control, balance, gait mechanics, effusion, and pain.             Education: continue HEP as prescribed    Plan:  Progress per protocol     Goals:  Active       PT Problem       PT Goal 1       Start:  12/20/24    Expected End:  06/20/25       Patient will report <4/10 pain in R hip by 4 weeks   PROM R hip flexion 90 deg by 3-4 weeks, 110 deg by 6 weeks, abduction 20 deg by 3 weeks, 40 deg by 6 weeks, ER 20 deg by 3 weeks, and IR 30 deg by 4-6 weeks to demonstrate improved joint mechanics to perform ADLs   LTG 12-16 weeks R hip strength: flexion, abduction, ER/IR, extension, glute max  >4+ to 5/5 MMT or 80% to demonstrate pelvic stability during ADLs and higher level functional tasks   Patient able to perform SL squat without valgus or anterior hip impingement to demonstrate LE biomechanics by 6- 8 weeks   LEFS > 30/80 to demonstrate improved ability to perform ADLs by 6 weeks

## 2025-02-25 ENCOUNTER — TREATMENT (OUTPATIENT)
Dept: PHYSICAL THERAPY | Facility: HOSPITAL | Age: 25
End: 2025-02-25
Payer: COMMERCIAL

## 2025-02-25 DIAGNOSIS — S73.192D OTHER SPRAIN OF LEFT HIP, SUBSEQUENT ENCOUNTER: ICD-10-CM

## 2025-02-25 DIAGNOSIS — Z47.89 ENCOUNTER FOR OTHER ORTHOPEDIC AFTERCARE: ICD-10-CM

## 2025-02-25 DIAGNOSIS — M25.552 PAIN IN LEFT HIP: ICD-10-CM

## 2025-02-25 PROCEDURE — 97110 THERAPEUTIC EXERCISES: CPT | Mod: GP

## 2025-02-25 PROCEDURE — 97140 MANUAL THERAPY 1/> REGIONS: CPT | Mod: GP

## 2025-02-25 ASSESSMENT — PAIN - FUNCTIONAL ASSESSMENT: PAIN_FUNCTIONAL_ASSESSMENT: 0-10

## 2025-02-25 ASSESSMENT — PAIN SCALES - GENERAL: PAINLEVEL_OUTOF10: 0 - NO PAIN

## 2025-02-25 NOTE — PROGRESS NOTES
"  Physical Therapy  Physical Therapy Treatment Note    Patient Name: Jessica Perez  MRN: [id number]  Today's Date: 2/20/2025  Time Calculation  Start Time: 0655  Stop Time: 0800  Time Calculation (min): 65 min    Insurance:  Visit number: 12 of 50 (15 total)  Authorization info: no auth and reset 1/1/2025           Insurance Type: [place] (no vaso)    Current Problem  1. Other sprain of left hip, subsequent encounter  Follow Up In Physical Therapy      2. Encounter for other orthopedic aftercare  Follow Up In Physical Therapy      3. Pain in left hip  Follow Up In Physical Therapy            Precautions  No jumping/running    General  Reason for Referral: Left Hip Arthroscopy; Labral Repair; Rim Trim  Referred By: Dr. Errol CHAMBERS  DOS: 12/19/2024  Days post-op: 68  PO: 9 weeks    Pain  Pain Assessment: 0-10  0-10 (Numeric) Pain Score: 0 - No pain    Subjective:   Pt arrived to therapy reporting she is feeling well overall. No complaints of pain. Feels ROM continues to get better.       Performing HEP?: Yes      Objective:   HIP    Hip PROM *post MT  L hip flexion: (125°): 108  L hip ER: (45°): 40  L hip IR: (45°): 24  L hip IR (prone): 30  L hip flexion: 112*    Treatment Performed:    Therapeutic Exercise:    40 min  Upright bike x 7'  PB hip flexion 2x20  Prone IR/ER 2x20  Prone quad stretch 1'x2  S/l reverse clams 3x12  Mod side plank + hib ABD 2 x 30\"  BFR 160mmHG 30-15-15-15  Hook lying bridge  Standing hip abduction red TB  Standing hip extension red TB  SL RDL 13# 2 x 12  RFE SS w/roation 0# 2 x 12      NT  S/l hip abduction 3x10  Resisted side stepping red TB loop 3x10  BW squats to plinth to 60 deg 3x10  TG Leg Press seat 0 1 slightly Staggered 3x10   Matrix Knee extension 25# 3x12  Half kneeling hip flexor stretch  SLS 3 x 30\"   Gait on treadmill 2 minutes SL     Half kneel hip flexor stretch 2x1'  Modified side plank 5x10\"-NT    Therapeutic Activity:      min  NT  Treadmill walking SL x3' DL x3' speed " 1.5mph    Manual Therapy:    25 min  STM adductors, glutes, hip flexors, posterior hip rotators  LAD  Circumduction   Long rolling  Lateral hip distraction grade 2/3   MWM belt IR  Prone PA with IR/ER     Neuromuscular Re-education:   0  min      Therapeutic Modalities:     min  Vaso 34 deg min light compression in prone NT    Other:       min          Assessment:   The focus of the session was strength, ROM, stretching, joint mobilization, and soft tissue massage. The pt demonstrated good tolerance to the noted exercises today by demonstrating improvements in ROM this week compared to previous week. The pt is demonstrated good progress in skilled rehab at this time. Patient was progressed through increased resistance of familiar exercises and instruction of SL RDL/RFE SS. The pt is still limited in overall strength, ROM, flexibility, motor control, balance, gait mechanics, effusion, and pain at this time. The pt continues to be a good candidate for skilled PT, in order to further improve strength, ROM, flexibility, motor control, balance, gait mechanics, effusion, and pain.          Education: continue HEP as prescribed    Plan:  Progress per protocol     Goals:  Active       PT Problem       PT Goal 1       Start:  12/20/24    Expected End:  06/20/25       Patient will report <4/10 pain in R hip by 4 weeks   PROM R hip flexion 90 deg by 3-4 weeks, 110 deg by 6 weeks, abduction 20 deg by 3 weeks, 40 deg by 6 weeks, ER 20 deg by 3 weeks, and IR 30 deg by 4-6 weeks to demonstrate improved joint mechanics to perform ADLs   LTG 12-16 weeks R hip strength: flexion, abduction, ER/IR, extension, glute max  >4+ to 5/5 MMT or 80% to demonstrate pelvic stability during ADLs and higher level functional tasks   Patient able to perform SL squat without valgus or anterior hip impingement to demonstrate LE biomechanics by 6- 8 weeks   LEFS > 30/80 to demonstrate improved ability to perform ADLs by 6 weeks

## 2025-02-28 ENCOUNTER — TREATMENT (OUTPATIENT)
Dept: PHYSICAL THERAPY | Facility: HOSPITAL | Age: 25
End: 2025-02-28
Payer: COMMERCIAL

## 2025-02-28 DIAGNOSIS — M25.652 HIP STIFFNESS, LEFT: ICD-10-CM

## 2025-02-28 DIAGNOSIS — R29.898 WEAKNESS OF LEFT HIP: ICD-10-CM

## 2025-02-28 DIAGNOSIS — S73.192D OTHER SPRAIN OF LEFT HIP, SUBSEQUENT ENCOUNTER: Primary | ICD-10-CM

## 2025-02-28 DIAGNOSIS — Z47.89 ENCOUNTER FOR OTHER ORTHOPEDIC AFTERCARE: ICD-10-CM

## 2025-02-28 DIAGNOSIS — M25.552 PAIN IN LEFT HIP: ICD-10-CM

## 2025-02-28 PROCEDURE — 97110 THERAPEUTIC EXERCISES: CPT | Mod: GP

## 2025-02-28 PROCEDURE — 97140 MANUAL THERAPY 1/> REGIONS: CPT | Mod: GP

## 2025-02-28 ASSESSMENT — PAIN - FUNCTIONAL ASSESSMENT: PAIN_FUNCTIONAL_ASSESSMENT: 0-10

## 2025-02-28 ASSESSMENT — PAIN SCALES - GENERAL: PAINLEVEL_OUTOF10: 0 - NO PAIN

## 2025-02-28 NOTE — PROGRESS NOTES
"  Physical Therapy  Physical Therapy Treatment Note    Patient Name: Jessica Perez  MRN: [id number]  Today's Date: 2/20/2025  Time Calculation  Start Time: 0655  Stop Time: 0805  Time Calculation (min): 70 min    Insurance:  Visit number: 12 of 50 (15 total)  Authorization info: no auth and reset 1/1/2025           Insurance Type: [place] (no vaso)    Current Problem  1. Other sprain of left hip, subsequent encounter  Follow Up In Physical Therapy      2. Encounter for other orthopedic aftercare  Follow Up In Physical Therapy      3. Pain in left hip  Follow Up In Physical Therapy      4. Hip stiffness, left        5. Weakness of left hip              Precautions  No jumping/running    General  Reason for Referral: Left Hip Arthroscopy; Labral Repair; Rim Trim  Referred By: Dr. Errol CHAMBERS  DOS: 12/19/2024  Days post-op: 71  PO: 10 weeks    Pain  Pain Assessment: 0-10  0-10 (Numeric) Pain Score: 0 - No pain    Subjective:   Pt arrived to therapy reporting continued improvement. Feels tight this morning. States this is normal for time of day.       Performing HEP?: Yes      Objective:   HIP    Hip PROM *post MT  L hip flexion: (125°): 112  L hip ER: (45°): 50  L hip IR: (45°): 30  L hip IR (prone): 30  L hip flexion: 118*    Treatment Performed:    Therapeutic Exercise:    45 min  Upright bike x 7'  PB hip flexion 2x20  Prone IR/ER x20  Prone quad stretch 1'x2  S/l reverse clams 2.5# 3 x 12  Side stepping/monster walks with red RB 2 x 10 yd ea.  BFR 160mmHG 30-15-15-15  SL bridge >>> staggered bridge  S/l hip ABD 2.5#   Lateral heel tap 6 inch box 3 x 12  SL RDL 13# 3 x 12      NT  S/l hip abduction 3x10  Resisted side stepping red TB loop 3x10  BW squats to plinth to 60 deg 3x10  TG Leg Press seat 0 1 slightly Staggered 3x10   Matrix Knee extension 25# 3x12  SLS 3 x 30\"   Gait on treadmill 2 minutes SL   Half kneel hip flexor stretch 2x1'  Mod side plank + hib ABD 2 x 30\"  Standing hip extension red TB  RFE SS " w/roation 0# 2 x 12    Therapeutic Activity:      min  NT  Treadmill walking SL x3' DL x3' speed 1.5mph    Manual Therapy:    25 min  STM adductors, glutes, hip flexors, posterior hip rotators  LAD  Circumduction   Lateral hip distraction grade 3  Lateral hip distraction in 90 deg flex grade 3   MWM belt IR    NT  Prone PA with IR/ER   Long rolling    Neuromuscular Re-education:   0  min      Therapeutic Modalities:     min  Ice provided to go per patient request    Other:       min          Assessment:   The focus of the session was strength, ROM, stretching, joint mobilization, and soft tissue massage. On arrival, patient demonstrated decreased ROM and multiple trigger points within gluteal musculature. Following MT, a significant improvement of ROM was seen. The pt demonstrated good tolerance to the noted exercises today. The pt is demonstrated good progress in skilled rehab at this time. The pt is still limited in overall strength, ROM, flexibility, motor control, balance, gait mechanics, effusion, and pain at this time. The pt continues to be a good candidate for skilled PT, in order to further improve strength, ROM, flexibility, motor control, balance, gait mechanics, effusion, and pain.      Education: continue HEP as prescribed    Plan:  Progress per protocol     Goals:  Active       PT Problem       PT Goal 1       Start:  12/20/24    Expected End:  06/20/25       Patient will report <4/10 pain in R hip by 4 weeks   PROM R hip flexion 90 deg by 3-4 weeks, 110 deg by 6 weeks, abduction 20 deg by 3 weeks, 40 deg by 6 weeks, ER 20 deg by 3 weeks, and IR 30 deg by 4-6 weeks to demonstrate improved joint mechanics to perform ADLs   LTG 12-16 weeks R hip strength: flexion, abduction, ER/IR, extension, glute max  >4+ to 5/5 MMT or 80% to demonstrate pelvic stability during ADLs and higher level functional tasks   Patient able to perform SL squat without valgus or anterior hip impingement to demonstrate LE  biomechanics by 6- 8 weeks   LEFS > 30/80 to demonstrate improved ability to perform ADLs by 6 weeks

## 2025-03-03 ENCOUNTER — APPOINTMENT (OUTPATIENT)
Dept: PHYSICAL THERAPY | Facility: HOSPITAL | Age: 25
End: 2025-03-03
Payer: COMMERCIAL

## 2025-03-04 ENCOUNTER — TREATMENT (OUTPATIENT)
Dept: PHYSICAL THERAPY | Facility: HOSPITAL | Age: 25
End: 2025-03-04
Payer: COMMERCIAL

## 2025-03-04 DIAGNOSIS — Z47.89 ENCOUNTER FOR OTHER ORTHOPEDIC AFTERCARE: ICD-10-CM

## 2025-03-04 DIAGNOSIS — M25.552 PAIN IN LEFT HIP: ICD-10-CM

## 2025-03-04 DIAGNOSIS — S73.192D OTHER SPRAIN OF LEFT HIP, SUBSEQUENT ENCOUNTER: ICD-10-CM

## 2025-03-04 PROCEDURE — 97140 MANUAL THERAPY 1/> REGIONS: CPT | Mod: GP | Performed by: PHYSICAL THERAPIST

## 2025-03-04 PROCEDURE — 97110 THERAPEUTIC EXERCISES: CPT | Mod: GP | Performed by: PHYSICAL THERAPIST

## 2025-03-04 ASSESSMENT — PAIN SCALES - GENERAL: PAINLEVEL_OUTOF10: 0 - NO PAIN

## 2025-03-04 ASSESSMENT — PAIN - FUNCTIONAL ASSESSMENT: PAIN_FUNCTIONAL_ASSESSMENT: 0-10

## 2025-03-04 NOTE — PROGRESS NOTES
"  Physical Therapy  Physical Therapy Treatment Note    Patient Name: Jessica Perez  MRN: [id number]  Today's Date: 3/4/2025  Time Calculation  Start Time: 1300  Stop Time: 1400  Time Calculation (min): 60 min    Insurance:  Visit number: 13 of 50 (16 total)  Authorization info: no auth and reset 1/1/2025           Insurance Type: [place] (no vaso)    Current Problem  1. Other sprain of left hip, subsequent encounter  Follow Up In Physical Therapy      2. Encounter for other orthopedic aftercare  Follow Up In Physical Therapy      3. Pain in left hip  Follow Up In Physical Therapy            Precautions  No jumping/running    General  Reason for Referral: Left Hip Arthroscopy; Labral Repair; Rim Trim  Referred By: Dr. Erorl CHAMBERS  DOS: 12/19/2024  Days post-op: 75  PO: 10 weeks    Pain  Pain Assessment: 0-10  0-10 (Numeric) Pain Score: 0 - No pain    Subjective:   Pt arrived to therapy reporting she is feeling really well today. Pt reports less stiffness.      Performing HEP?: Yes      Objective:   HIP    Hip PROM *post MT     L hip IR (prone): 30  L hip flexion: 118*    Treatment Performed:    Therapeutic Exercise:    30 min  Upright bike x 7'  Plinth squats to 90 deg 2x12  TG SL press seat 1 no cords 3x12  Prone IR/ER x20  Prone resisted hip IR/ER blue band 2x12 each  Fwd step up with contralateral knee drive pink KB 3x12      NT    S/l hip abduction 3x10  S/l reverse clams 2.5# 3 x 12  Side stepping/monster walks with red RB 2 x 10 yd ea.  BFR 160mmHG 30-15-15-15  SL bridge >>> staggered bridge  S/l hip ABD 2.5#   Lateral heel tap 6 inch box 3 x 12  SL RDL 13# 3 x 12  S/l reverse clams 2.5# 3 x 12  Side stepping/monster walks with red RB 2 x 10 yd ea.  Resisted side stepping red TB loop 3x10  BW squats to plinth to 60 deg 3x10  TG Leg Press seat 0 1 slightly Staggered 3x10   Matrix Knee extension 25# 3x12  SLS 3 x 30\"   Gait on treadmill 2 minutes SL   Half kneel hip flexor stretch 2x1'  Mod side plank + hib ABD 2 " "x 30\"  Standing hip extension red TB  RFE SS w/roation 0# 2 x 12    Therapeutic Activity:      min  NT  Treadmill walking SL x3' DL x3' speed 1.5mph    Manual Therapy:    30 min  STM adductors, glutes, hip flexors, posterior hip rotators  LAD  Circumduction   Lateral hip distraction grade 3  Lateral hip distraction in 90 deg flex grade 3   MWM belt IR    NT  Prone PA with IR/ER   Long rolling    Neuromuscular Re-education:   0  min      Therapeutic Modalities:     min  Ice provided to go per patient request    Other:       min          Assessment:   The focus of the session was strength, ROM, stretching, joint mobilization, and soft tissue massage. On arrival, patient demonstrated decreased ROM and multiple trigger points within gluteal musculature. Following MT, a significant improvement of ROM was seen. The pt demonstrated good tolerance to the noted exercises today. The pt is demonstrated good progress in skilled rehab at this time. The pt is still limited in overall strength, ROM, flexibility, motor control, balance, gait mechanics, effusion, and pain at this time. The pt continues to be a good candidate for skilled PT, in order to further improve strength, ROM, flexibility, motor control, balance, gait mechanics, effusion, and pain.      Education: continue HEP as prescribed    Plan:  Progress per protocol     Goals:  Active       PT Problem       PT Goal 1       Start:  12/20/24    Expected End:  06/20/25       Patient will report <4/10 pain in R hip by 4 weeks   PROM R hip flexion 90 deg by 3-4 weeks, 110 deg by 6 weeks, abduction 20 deg by 3 weeks, 40 deg by 6 weeks, ER 20 deg by 3 weeks, and IR 30 deg by 4-6 weeks to demonstrate improved joint mechanics to perform ADLs   LTG 12-16 weeks R hip strength: flexion, abduction, ER/IR, extension, glute max  >4+ to 5/5 MMT or 80% to demonstrate pelvic stability during ADLs and higher level functional tasks   Patient able to perform SL squat without valgus or " anterior hip impingement to demonstrate LE biomechanics by 6- 8 weeks   LEFS > 30/80 to demonstrate improved ability to perform ADLs by 6 weeks

## 2025-03-06 ENCOUNTER — TREATMENT (OUTPATIENT)
Dept: PHYSICAL THERAPY | Facility: HOSPITAL | Age: 25
End: 2025-03-06
Payer: COMMERCIAL

## 2025-03-06 DIAGNOSIS — M25.552 PAIN IN LEFT HIP: ICD-10-CM

## 2025-03-06 DIAGNOSIS — R29.898 WEAKNESS OF LEFT HIP: ICD-10-CM

## 2025-03-06 DIAGNOSIS — M25.652 HIP STIFFNESS, LEFT: Primary | ICD-10-CM

## 2025-03-06 DIAGNOSIS — S73.192D OTHER SPRAIN OF LEFT HIP, SUBSEQUENT ENCOUNTER: ICD-10-CM

## 2025-03-06 DIAGNOSIS — Z47.89 ENCOUNTER FOR OTHER ORTHOPEDIC AFTERCARE: ICD-10-CM

## 2025-03-06 DIAGNOSIS — Z47.89 ORTHOPEDIC AFTERCARE: ICD-10-CM

## 2025-03-06 PROCEDURE — 97110 THERAPEUTIC EXERCISES: CPT | Mod: GP | Performed by: PHYSICAL THERAPIST

## 2025-03-06 PROCEDURE — 97140 MANUAL THERAPY 1/> REGIONS: CPT | Mod: GP | Performed by: PHYSICAL THERAPIST

## 2025-03-09 ASSESSMENT — PAIN - FUNCTIONAL ASSESSMENT: PAIN_FUNCTIONAL_ASSESSMENT: 0-10

## 2025-03-09 ASSESSMENT — PAIN SCALES - GENERAL: PAINLEVEL_OUTOF10: 0 - NO PAIN

## 2025-03-09 NOTE — PROGRESS NOTES
Physical Therapy  Physical Therapy Treatment Note    Patient Name: Jessica Perez  MRN: [id number]  Today's Date: 3/6/2025  Time Calculation  Start Time: 1630  Stop Time: 1730  Time Calculation (min): 60 min    Insurance:  Visit number: 14 of 50 (17 total)  Authorization info: no auth and reset 1/1/2025           Insurance Type: [place] (no vaso)    Current Problem  1. Hip stiffness, left        2. Other sprain of left hip, subsequent encounter  Follow Up In Physical Therapy      3. Encounter for other orthopedic aftercare  Follow Up In Physical Therapy      4. Pain in left hip  Follow Up In Physical Therapy      5. Weakness of left hip        6. Orthopedic aftercare              Precautions  No jumping/running    General  Reason for Referral: Left Hip Arthroscopy; Labral Repair; Rim Trim  Referred By: Dr. Errol CHAMBERS  DOS: 12/19/2024  Days post-op: 77  PO: 11 weeks    Pain  Pain Assessment: 0-10  0-10 (Numeric) Pain Score: 0 - No pain    Subjective:   Pt arrived to therapy reporting she is feeling really well today. Pt reports less stiffness overall. Pt reports she continues with focusing HEP and mobility       Performing HEP?: Yes      Objective:   HIP    Hip PROM *post MT     L hip IR (at 90): 30  L hip flexion: 120*    Treatment Performed:    Therapeutic Exercise:      min  Upright bike x 7'  Resisted side stepping red TB loop 3x10  TG SL press seat 1 no cords 3x12  Prone IR/ER x20  Prone resisted hip IR/ER blue band 2x12 each  Fwd step up with contralateral knee drive pink KB 3x12  Lateral heel tap 6 inch box 3 x 12  SL RDL 13# 3 x 12      NT    S/l hip abduction 3x10  S/l reverse clams 2.5# 3 x 12  Side stepping/monster walks with red RB 2 x 10 yd ea.  BFR 160mmHG 30-15-15-15  SL bridge >>> staggered bridge  S/l hip ABD 2.5#   Lateral heel tap 6 inch box 3 x 12  SL RDL 13# 3 x 12  S/l reverse clams 2.5# 3 x 12  Side stepping/monster walks with red RB 2 x 10 yd ea.  BW squats to plinth to 60 deg 3x10  TG Leg  "Press seat 0 1 slightly Staggered 3x10   Matrix Knee extension 25# 3x12  SLS 3 x 30\"   Gait on treadmill 2 minutes SL   Half kneel hip flexor stretch 2x1'  Mod side plank + hib ABD 2 x 30\"  Standing hip extension red TB  RFE SS w/roation 0# 2 x 12    Therapeutic Activity:      min  NT  Treadmill walking SL x3' DL x3' speed 1.5mph    Manual Therapy:      min  STM adductors, glutes, hip flexors, posterior hip rotators  LAD  Circumduction   Lateral hip distraction grade 3  Lateral hip distraction in 90 deg flex grade 3   MWM belt IR    NT  Prone PA with IR/ER   Long rolling    Neuromuscular Re-education:   0  min      Therapeutic Modalities:     min      Other:       min          Assessment:   The focus of the session was strength, ROM, stretching, joint mobilization, and soft tissue massage. On arrival, patient demonstrated decreased ROM and multiple trigger points within gluteal musculature. Following MT, a significant improvement of ROM was seen. The pt demonstrated good tolerance to the noted exercises today. The pt is demonstrated good progress in skilled rehab at this time. The pt is still limited in overall strength, ROM, flexibility, motor control, balance, gait mechanics, effusion, and pain at this time. The pt continues to be a good candidate for skilled PT, in order to further improve strength, ROM, flexibility, motor control, balance, gait mechanics, effusion, and pain.      Education: continue HEP as prescribed    Plan:  Progress per protocol     Goals:  Active       PT Problem       PT Goal 1       Start:  12/20/24    Expected End:  06/20/25       Patient will report <4/10 pain in R hip by 4 weeks   PROM R hip flexion 90 deg by 3-4 weeks, 110 deg by 6 weeks, abduction 20 deg by 3 weeks, 40 deg by 6 weeks, ER 20 deg by 3 weeks, and IR 30 deg by 4-6 weeks to demonstrate improved joint mechanics to perform ADLs   LTG 12-16 weeks R hip strength: flexion, abduction, ER/IR, extension, glute max  >4+ to 5/5 MMT " or 80% to demonstrate pelvic stability during ADLs and higher level functional tasks   Patient able to perform SL squat without valgus or anterior hip impingement to demonstrate LE biomechanics by 6- 8 weeks   LEFS > 30/80 to demonstrate improved ability to perform ADLs by 6 weeks

## 2025-03-11 ENCOUNTER — TREATMENT (OUTPATIENT)
Dept: PHYSICAL THERAPY | Facility: HOSPITAL | Age: 25
End: 2025-03-11
Payer: COMMERCIAL

## 2025-03-11 DIAGNOSIS — R29.898 WEAKNESS OF LEFT HIP: ICD-10-CM

## 2025-03-11 DIAGNOSIS — M25.552 PAIN IN LEFT HIP: ICD-10-CM

## 2025-03-11 DIAGNOSIS — M25.652 HIP STIFFNESS, LEFT: Primary | ICD-10-CM

## 2025-03-11 DIAGNOSIS — Z47.89 ENCOUNTER FOR OTHER ORTHOPEDIC AFTERCARE: ICD-10-CM

## 2025-03-11 DIAGNOSIS — S73.192D OTHER SPRAIN OF LEFT HIP, SUBSEQUENT ENCOUNTER: ICD-10-CM

## 2025-03-11 DIAGNOSIS — Z47.89 ORTHOPEDIC AFTERCARE: ICD-10-CM

## 2025-03-11 PROCEDURE — 97110 THERAPEUTIC EXERCISES: CPT | Mod: GP | Performed by: PHYSICAL THERAPIST

## 2025-03-11 PROCEDURE — 97140 MANUAL THERAPY 1/> REGIONS: CPT | Mod: GP | Performed by: PHYSICAL THERAPIST

## 2025-03-11 ASSESSMENT — PAIN SCALES - GENERAL: PAINLEVEL_OUTOF10: 2

## 2025-03-11 NOTE — PROGRESS NOTES
Physical Therapy  Physical Therapy Treatment Note     Patient Name: Jessica Perez  MRN: [id number]  Today's Date: 3/11/2025       Insurance:  Visit number: 15 of 50 (17 total)  Authorization info: no auth and reset 1/1/2025           Insurance Type: [place] (no vaso)     Current Problem  1. Hip stiffness, left          2. Other sprain of left hip, subsequent encounter  Follow Up In Physical Therapy       3. Encounter for other orthopedic aftercare  Follow Up In Physical Therapy       4. Pain in left hip  Follow Up In Physical Therapy       5. Weakness of left hip          6. Orthopedic aftercare                   Precautions  No jumping/running     General  Reason for Referral: Left Hip Arthroscopy; Labral Repair; Rim Trim  Referred By: Dr. Errol CHAMBERS  DOS: 12/19/2024  Days post-op: 77  PO: 11 weeks     Pain  Pain Assessment: 0-10  0-10 (Numeric) Pain Score: 2 - Minimal pain in the front of the hip with goblet squat exercise.     Subjective:   Pt is doing well today. Pt reported no pain today with only minimal stiffness in L hip.        Performing HEP?: Yes        Objective:   HIP     Hip PROM *post MT  L hip IR (at 90): 30  L hip flexion: 120*     Treatment Performed:     Therapeutic Exercise:                     40       min  Upright bike x 7'  Resisted side stepping blue TB loop 3x10  Half kneel hip flexor stretch 2x1'  Lateral lunge stretch 2x1'  90/90 stretch bilat x10  SL cone taps 3x12  Goblet Squats 3x12  Palloff Press with sidestep 3x12                   NT:  TG SL press seat 1 no cords 3x12  Prone IR/ER x20  Prone resisted hip IR/ER blue band 2x12 each  Fwd step up with contralateral knee drive pink KB 3x12  Lateral heel tap 6 inch box 3 x 12  SL RDL 13# 3 x 12  S/l hip abduction 3x10  S/l reverse clams 2.5# 3 x 12  Side stepping/monster walks with red RB 2 x 10 yd ea.  BFR 160mmHG 30-15-15-15  SL bridge >>> staggered bridge  S/l hip ABD 2.5#   Lateral heel tap 6 inch box 3 x 12  SL RDL 13# 3 x 12  S/l  "reverse clams 2.5# 3 x 12  Side stepping/monster walks with red RB 2 x 10 yd ea.  BW squats to plinth to 60 deg 3x10  TG Leg Press seat 0 1 slightly Staggered 3x10   Matrix Knee extension 25# 3x12  SLS 3 x 30\"   Gait on treadmill 2 minutes SL   Mod side plank + hib ABD 2 x 30\"  Standing hip extension red TB  RFE SS w/roation 0# 2 x 12     Therapeutic Activity:                             min  NT  Treadmill walking SL x3' DL x3' speed 1.5mph     Manual Therapy:                            20       min  STM adductors, glutes, hip flexors, posterior hip rotators  LAD  Circumduction   Lateral hip distraction grade 3  Lateral hip distraction in 90 deg flex grade 3   MWM belt IR     NT  Prone PA with IR/ER   Long rolling     Neuromuscular Re-education:                        0  min        Therapeutic Modalities:                         min        Other:                                                  min              Assessment:   The focus of the session was strength, ROM, stretching, joint mobilization, soft tissue massage, balance, motor control, dynamic stability training, and functional training. The pt demonstrated good tolerance to the noted exercises today by tolerating increased dosage of hip strengthening, multi-planar movement and dynamic stability exercises with less than 2/10 pain reported. The pt is demonstrated good progress in skilled rehab at this time. The pt is still limited in overall strength, ROM, flexibility, motor control, and balance at this time. The pt continues to be a good candidate for skilled PT, in order to further improve strength, ROM, flexibility, motor control, balance, and dynamic stability with SL activities. .      Education: continue HEP as prescribed     Plan:  Progress per protocol. Incorporate more strengthening exercises.     Goals:  Active         PT Problem         PT Goal 1         Start:  12/20/24    Expected End:  06/20/25        Patient will report <4/10 pain in R hip by 4 " weeks   PROM R hip flexion 90 deg by 3-4 weeks, 110 deg by 6 weeks, abduction 20 deg by 3 weeks, 40 deg by 6 weeks, ER 20 deg by 3 weeks, and IR 30 deg by 4-6 weeks to demonstrate improved joint mechanics to perform ADLs   LTG 12-16 weeks R hip strength: flexion, abduction, ER/IR, extension, glute max  >4+ to 5/5 MMT or 80% to demonstrate pelvic stability during ADLs and higher level functional tasks   Patient able to perform SL squat without valgus or anterior hip impingement to demonstrate LE biomechanics by 6- 8 weeks   LEFS > 30/80 to demonstrate improved ability to perform ADLs by 6 weeks

## 2025-03-13 ENCOUNTER — TREATMENT (OUTPATIENT)
Dept: PHYSICAL THERAPY | Facility: HOSPITAL | Age: 25
End: 2025-03-13
Payer: COMMERCIAL

## 2025-03-13 DIAGNOSIS — M25.552 PAIN IN LEFT HIP: ICD-10-CM

## 2025-03-13 DIAGNOSIS — Z47.89 ORTHOPEDIC AFTERCARE: ICD-10-CM

## 2025-03-13 DIAGNOSIS — M25.652 HIP STIFFNESS, LEFT: Primary | ICD-10-CM

## 2025-03-13 DIAGNOSIS — S73.192D OTHER SPRAIN OF LEFT HIP, SUBSEQUENT ENCOUNTER: ICD-10-CM

## 2025-03-13 DIAGNOSIS — Z47.89 ENCOUNTER FOR OTHER ORTHOPEDIC AFTERCARE: ICD-10-CM

## 2025-03-13 PROCEDURE — 97140 MANUAL THERAPY 1/> REGIONS: CPT | Mod: GP | Performed by: PHYSICAL THERAPIST

## 2025-03-13 PROCEDURE — 97110 THERAPEUTIC EXERCISES: CPT | Mod: GP | Performed by: PHYSICAL THERAPIST

## 2025-03-13 NOTE — PROGRESS NOTES
"Physical Therapy  Physical Therapy Treatment Note     Patient Name: Jessica Perez  MRN: [id number]  Today's Date: 3/13/2025       Insurance:  Visit number: 16 of 50 (17 total)  Authorization info: no auth and reset 1/1/2025           Insurance Type: [place] (no vaso)     Current Problem  1. Hip stiffness, left          2. Other sprain of left hip, subsequent encounter  Follow Up In Physical Therapy       3. Encounter for other orthopedic aftercare  Follow Up In Physical Therapy       4. Pain in left hip  Follow Up In Physical Therapy       5. Weakness of left hip          6. Orthopedic aftercare                   Precautions  No jumping/running     General  Reason for Referral: Left Hip Arthroscopy; Labral Repair; Rim Trim  Referred By: Dr. Errol CHAMBERS  DOS: 12/19/2024  Days post-op: 77  PO: 11 weeks     Pain  Pain Assessment: 0-10  0-10 (Numeric) Pain Score: 0     Subjective:   Pt is doing well today. Pt reported no pain today with only minimal stiffness in L hip. However, reported no pain or soreness after last PT session.        Performing HEP?: Yes        Objective:   HIP     Hip PROM *post MT  L hip IR (at 90): 30  L hip flexion: 120*     Treatment Performed:     Therapeutic Exercise:                     45       min  Upright bike x 7'  Resisted side stepping blue TB loop 3x10  Half kneel hip flexor stretch 2x1'  Lateral lunge stretch 2x1'  90/90 stretch bilat x10  KB 20\" Goblet Squats 3x12  SL Leg Press 25# 3x12   S/L Hip Abduction 4-Way (fwd, bwd, pulsing, circles)  Modified Figure 4 stretch 2x1'  Lateral Cross-over Step ups 12\" box 1x12 no weight, 2x12 KB                 NT:  TG SL press seat 1 no cords 3x12  Prone IR/ER x20  Prone resisted hip IR/ER blue band 2x12 each  Fwd step up with contralateral knee drive pink KB 3x12  Lateral heel tap 6 inch box 3 x 12  SL RDL 13# 3 x 12  S/l hip abduction 3x10  S/l reverse clams 2.5# 3 x 12  Side stepping/monster walks with red RB 2 x 10 yd ea.  BFR 160mmHG " "30-15-15-15  SL bridge >>> staggered bridge  S/l hip ABD 2.5#   Lateral heel tap 6 inch box 3 x 12  SL RDL 13# 3 x 12  S/l reverse clams 2.5# 3 x 12  Side stepping/monster walks with red RB 2 x 10 yd ea.  BW squats to plinth to 60 deg 3x10  TG Leg Press seat 0 1 slightly Staggered 3x10   Matrix Knee extension 25# 3x12  SLS 3 x 30\"   Gait on treadmill 2 minutes SL   Mod side plank + hib ABD 2 x 30\"  Standing hip extension red TB  RFE SS w/roation 0# 2 x 12     Therapeutic Activity:                             min  NT  Treadmill walking SL x3' DL x3' speed 1.5mph     Manual Therapy:                            20       min  STM adductors, glutes, hip flexors, posterior hip rotators  LAD  Circumduction   Lateral hip distraction grade 3  Lateral hip distraction in 90 deg flex grade 3   MWM belt IR     NT  Prone PA with IR/ER   Long rolling     Neuromuscular Re-education:                        0  min        Therapeutic Modalities:                         min        Other:                                                  min              Assessment:   The focus of the session was strength, ROM, stretching, joint mobilization, soft tissue massage, balance, motor control, dynamic stability training, and functional training. The pt demonstrated good tolerance to the noted exercises today by reporting no pain with reported exercises. The pt is demonstrated good progress in skilled rehab at this time. The pt is still limited in overall strength, ROM, flexibility, motor control, and balance at this time. The pt continues to be a good candidate for skilled PT, in order to further improve strength, ROM, flexibility, motor control, and balance.       Education: continue HEP as prescribed     Plan:  Progress per protocol. Incorporate more strengthening exercises.     Goals:  Active         PT Problem         PT Goal 1         Start:  12/20/24    Expected End:  06/20/25        Patient will report <4/10 pain in R hip by 4 weeks "   PROM R hip flexion 90 deg by 3-4 weeks, 110 deg by 6 weeks, abduction 20 deg by 3 weeks, 40 deg by 6 weeks, ER 20 deg by 3 weeks, and IR 30 deg by 4-6 weeks to demonstrate improved joint mechanics to perform ADLs   LTG 12-16 weeks R hip strength: flexion, abduction, ER/IR, extension, glute max  >4+ to 5/5 MMT or 80% to demonstrate pelvic stability during ADLs and higher level functional tasks   Patient able to perform SL squat without valgus or anterior hip impingement to demonstrate LE biomechanics by 6- 8 weeks   LEFS > 30/80 to demonstrate improved ability to perform ADLs by 6 weeks

## 2025-03-18 ENCOUNTER — TREATMENT (OUTPATIENT)
Dept: PHYSICAL THERAPY | Facility: HOSPITAL | Age: 25
End: 2025-03-18
Payer: COMMERCIAL

## 2025-03-18 DIAGNOSIS — M25.552 PAIN IN LEFT HIP: ICD-10-CM

## 2025-03-18 DIAGNOSIS — S73.192D OTHER SPRAIN OF LEFT HIP, SUBSEQUENT ENCOUNTER: ICD-10-CM

## 2025-03-18 DIAGNOSIS — Z47.89 ORTHOPEDIC AFTERCARE: Primary | ICD-10-CM

## 2025-03-18 DIAGNOSIS — Z47.89 ENCOUNTER FOR OTHER ORTHOPEDIC AFTERCARE: ICD-10-CM

## 2025-03-18 PROCEDURE — 97110 THERAPEUTIC EXERCISES: CPT | Mod: GP | Performed by: PHYSICAL THERAPIST

## 2025-03-18 PROCEDURE — 97140 MANUAL THERAPY 1/> REGIONS: CPT | Mod: GP | Performed by: PHYSICAL THERAPIST

## 2025-03-18 ASSESSMENT — PAIN SCALES - GENERAL: PAINLEVEL_OUTOF10: 0 - NO PAIN

## 2025-03-18 NOTE — PROGRESS NOTES
"Physical Therapy  Physical Therapy Treatment Note     Patient Name: Jessica Perez  MRN: [id number]  Today's Date: 3/18/2025       Insurance:  Visit number: 17 of 50 (17 total)  Authorization info: no auth and reset 1/1/2025           Insurance Type: [place] (no vaso)     Current Problem  1. Hip stiffness, left          2. Other sprain of left hip, subsequent encounter  Follow Up In Physical Therapy       3. Encounter for other orthopedic aftercare  Follow Up In Physical Therapy       4. Pain in left hip  Follow Up In Physical Therapy       5. Weakness of left hip          6. Orthopedic aftercare                   Precautions  No jumping/running     General  Reason for Referral: Left Hip Arthroscopy; Labral Repair; Rim Trim  Referred By: Dr. Errol CHAMBERS  DOS: 12/19/2024  Days post-op: 77  PO: 12 weeks     Pain  Pain Assessment: 0-10  0-10 (Numeric) Pain Score: 0     Subjective:   Pt is doing well today. Pt reported no pain upon entry today. Pt said she felt \"good\" after last session.    Performing HEP?: Yes        Objective:   HIP     Hip PROM *post MT  L hip IR (at 90): 30  L hip flexion: 120*     Treatment Performed:     Therapeutic Exercise:                     45       min  Upright bike x 7'  Resisted side stepping blue TB loop 3x10  Half kneel hip flexor stretch 2x1'  Lateral lunge stretch 2x1'  90/90 stretch bilat x10  SL Rebound Toss with 4# Ball 3x12 (fwd, sideways)  SL 24\" Box squat 4x8  SL Heel Taps on 4\" step (fwd/bwd)  SL Bryon Paloff Press 13.6 Resistance 3x15        NT:    KB 20\" Goblet Squats 3x12  SL Leg Press 25# 3x12   S/L Hip Abduction 4-Way (fwd, bwd, pulsing, circles)  Modified Figure 4 stretch 2x1'  Lateral Cross-over Step ups 12\" box 1x12 no weight, 2x12 KB  TG SL press seat 1 no cords 3x12  Prone IR/ER x20  Prone resisted hip IR/ER blue band 2x12 each  Fwd step up with contralateral knee drive pink KB 3x12  Lateral heel tap 6 inch box 3 x 12  SL RDL 13# 3 x 12  S/l hip abduction 3x10  S/l " reverse clams 2.5# 3 x 12  Side stepping/monster walks with red RB 2 x 10 yd ea.  BFR 160mmHG 30-15-15-15  SL bridge >>> staggered bridge  S/l hip ABD 2.5#   Lateral heel tap 6 inch box 3 x 12  SL RDL 13# 3 x 12  S/l reverse clams 2.5# 3 x 12  Side stepping/monster walks with red RB 2 x 10 yd ea.  BW squats to plinth to 60 deg 3x10  TG Leg Press seat 0 1 slightly Staggered 3x10    Therapeutic Activity:                             min  NT  Treadmill walking SL x3' DL x3' speed 1.5mph     Manual Therapy:                            20       min  STM adductors, glutes, hip flexors, posterior hip rotators  LAD  Circumduction   Lateral hip distraction grade 3  Lateral hip distraction in 90 deg flex grade 3   MWM belt IR     NT  Prone PA with IR/ER   Long rolling     Neuromuscular Re-education:                        0  min        Therapeutic Modalities:                         min        Other:                                                  min              Assessment:   The focus of the session was strength, ROM, stretching, joint mobilization, soft tissue massage, balance, motor control, dynamic stability training, and functional training. The pt demonstrated good tolerance to the noted exercises today by reporting no pain with reported with improved ability to perform SL activities with 0/10 pain. The pt is demonstrated good progress in skilled rehab at this time. The pt is still limited in overall strength, ROM, flexibility, motor control, and balance at this time. The pt continues to be a good candidate for skilled PT, in order to further improve strength, ROM, flexibility, motor control, and balance.       Education: continue HEP as prescribed     Plan:  Progress per protocol. Incorporate more strengthening exercises.     Goals:  Active         PT Problem         PT Goal 1         Start:  12/20/24    Expected End:  06/20/25        Patient will report <4/10 pain in R hip by 4 weeks   PROM R hip flexion 90 deg by  3-4 weeks, 110 deg by 6 weeks, abduction 20 deg by 3 weeks, 40 deg by 6 weeks, ER 20 deg by 3 weeks, and IR 30 deg by 4-6 weeks to demonstrate improved joint mechanics to perform ADLs   LTG 12-16 weeks R hip strength: flexion, abduction, ER/IR, extension, glute max  >4+ to 5/5 MMT or 80% to demonstrate pelvic stability during ADLs and higher level functional tasks   Patient able to perform SL squat without valgus or anterior hip impingement to demonstrate LE biomechanics by 6- 8 weeks   LEFS > 30/80 to demonstrate improved ability to perform ADLs by 6 weeks

## 2025-03-19 ENCOUNTER — OFFICE VISIT (OUTPATIENT)
Dept: ORTHOPEDIC SURGERY | Facility: HOSPITAL | Age: 25
End: 2025-03-19
Payer: COMMERCIAL

## 2025-03-19 DIAGNOSIS — S73.192D TEAR OF LEFT ACETABULAR LABRUM, SUBSEQUENT ENCOUNTER: Primary | ICD-10-CM

## 2025-03-19 PROCEDURE — 99211 OFF/OP EST MAY X REQ PHY/QHP: CPT | Performed by: PHYSICIAN ASSISTANT

## 2025-03-19 NOTE — PROGRESS NOTES
HIP POST OP  The patient returns 6 weeks out from their left hip arthroscopy on 12/19/24.  They are doing great.  Range of motion is progressing very well.  She has a bit of tightness on exam.  Will have PT focus on this.  I explained the importance of continued stretching as they focus turns toward strength in PT.    I will see them back in 6 weeks. Can consider CSI if still tight.      LAMONT CabaC

## 2025-03-20 ENCOUNTER — TREATMENT (OUTPATIENT)
Dept: PHYSICAL THERAPY | Facility: HOSPITAL | Age: 25
End: 2025-03-20
Payer: COMMERCIAL

## 2025-03-20 DIAGNOSIS — S73.192D OTHER SPRAIN OF LEFT HIP, SUBSEQUENT ENCOUNTER: ICD-10-CM

## 2025-03-20 DIAGNOSIS — M25.552 PAIN IN LEFT HIP: ICD-10-CM

## 2025-03-20 DIAGNOSIS — Z47.89 ENCOUNTER FOR OTHER ORTHOPEDIC AFTERCARE: ICD-10-CM

## 2025-03-20 PROCEDURE — 97110 THERAPEUTIC EXERCISES: CPT | Mod: GP | Performed by: PHYSICAL THERAPIST

## 2025-03-20 PROCEDURE — 97140 MANUAL THERAPY 1/> REGIONS: CPT | Mod: GP | Performed by: PHYSICAL THERAPIST

## 2025-03-20 NOTE — PROGRESS NOTES
"Physical Therapy  Physical Therapy Treatment Note     Patient Name: Jessica Perez  MRN: [id number]  Today's Date: 3/18/2025       Insurance:  Visit number: 17 of 50 (17 total)  Authorization info: no auth and reset 1/1/2025           Insurance Type: [place] (no vaso)     Current Problem  1. Hip stiffness, left          2. Other sprain of left hip, subsequent encounter  Follow Up In Physical Therapy       3. Encounter for other orthopedic aftercare  Follow Up In Physical Therapy       4. Pain in left hip  Follow Up In Physical Therapy       5. Weakness of left hip          6. Orthopedic aftercare                   Precautions  No jumping/running     General  Reason for Referral: Left Hip Arthroscopy; Labral Repair; Rim Trim  Referred By: Dr. Errol CHAMBERS  DOS: 12/19/2024  Days post-op: 77  PO: 12 weeks     Pain  Pain Assessment: 0-10  0-10 (Numeric) Pain Score: 0     Subjective:   Pt is doing well today. Pt reported no pain today or soreness after last session.    Performing HEP?: Yes        Objective:   HIP     Hip PROM *post MT  L hip IR (at 90): 30  L hip flexion: 120*     Treatment Performed:     Therapeutic Exercise:                            min  Upright bike x 7'  Resisted side stepping blue TB loop 3x10  Half kneel hip flexor stretch 2x1'  Lateral lunge stretch 2x1'  90/90 stretch bilat x10  SL Rebound Toss with 4# Ball 3x12 (fwd, sideways)  SL 24\" Box squat 4x8  SL Heel Taps on 4\" step (fwd/bwd)  SL [place] Paloff Press 13.6 Resistance 3x15        NT:    KB 20\" Goblet Squats 3x12  SL Leg Press 25# 3x12   S/L Hip Abduction 4-Way (fwd, bwd, pulsing, circles)  Modified Figure 4 stretch 2x1'  Lateral Cross-over Step ups 12\" box 1x12 no weight, 2x12 KB  TG SL press seat 1 no cords 3x12  Prone IR/ER x20  Prone resisted hip IR/ER blue band 2x12 each  Fwd step up with contralateral knee drive pink KB 3x12  Lateral heel tap 6 inch box 3 x 12  SL RDL 13# 3 x 12  S/l hip abduction 3x10  S/l reverse clams 2.5# 3 x " 12  Side stepping/monster walks with red RB 2 x 10 yd ea.  BFR 160mmHG 30-15-15-15  SL bridge >>> staggered bridge  S/l hip ABD 2.5#   Lateral heel tap 6 inch box 3 x 12  SL RDL 13# 3 x 12  S/l reverse clams 2.5# 3 x 12  Side stepping/monster walks with red RB 2 x 10 yd ea.  BW squats to plinth to 60 deg 3x10  TG Leg Press seat 0 1 slightly Staggered 3x10    Therapeutic Activity:                             min  NT  Treadmill walking SL x3' DL x3' speed 1.5mph     Manual Therapy:                                   min  STM adductors, glutes, hip flexors, posterior hip rotators  LAD  Circumduction   Lateral hip distraction grade 3  Lateral hip distraction in 90 deg flex grade 3   MWM belt IR     NT  Prone PA with IR/ER   Long rolling     Neuromuscular Re-education:                        0  min        Therapeutic Modalities:                         min        Other:                                                  min              Assessment:   The focus of the session was strength, ROM, stretching, joint mobilization, soft tissue massage, balance, motor control, dynamic stability training, and functional training. The pt demonstrated good tolerance to the noted exercises today by reporting no pain with reported with improved ability to perform SL activities with 0/10 pain. The pt is demonstrated good progress in skilled rehab at this time. The pt is still limited in overall strength, ROM, flexibility, motor control, and balance at this time. The pt continues to be a good candidate for skilled PT, in order to further improve strength, ROM, flexibility, motor control, and balance.       Education: continue HEP as prescribed     Plan:  Progress per protocol. Incorporate more strengthening exercises.     Goals:  Active         PT Problem         PT Goal 1         Start:  12/20/24    Expected End:  06/20/25        Patient will report <4/10 pain in R hip by 4 weeks   PROM R hip flexion 90 deg by 3-4 weeks, 110 deg by 6  weeks, abduction 20 deg by 3 weeks, 40 deg by 6 weeks, ER 20 deg by 3 weeks, and IR 30 deg by 4-6 weeks to demonstrate improved joint mechanics to perform ADLs   LTG 12-16 weeks R hip strength: flexion, abduction, ER/IR, extension, glute max  >4+ to 5/5 MMT or 80% to demonstrate pelvic stability during ADLs and higher level functional tasks   Patient able to perform SL squat without valgus or anterior hip impingement to demonstrate LE biomechanics by 6- 8 weeks   LEFS > 30/80 to demonstrate improved ability to perform ADLs by 6 weeks

## 2025-03-21 ENCOUNTER — APPOINTMENT (OUTPATIENT)
Dept: ORTHOPEDIC SURGERY | Facility: HOSPITAL | Age: 25
End: 2025-03-21
Payer: COMMERCIAL

## 2025-03-24 NOTE — PROGRESS NOTES
"Physical Therapy  Physical Therapy Treatment Note     Patient Name: Jessica Perez  MRN: [id number]  Today's Date: 3/20/2025       Insurance:  Visit number: 18 of 50 (18 total)  Authorization info: no auth and reset 1/1/2025           Insurance Type: [place] (no vaso)     Current Problem  1. Hip stiffness, left          2. Other sprain of left hip, subsequent encounter  Follow Up In Physical Therapy       3. Encounter for other orthopedic aftercare  Follow Up In Physical Therapy       4. Pain in left hip  Follow Up In Physical Therapy       5. Weakness of left hip          6. Orthopedic aftercare                   Precautions  No jumping/running     General  Reason for Referral: Left Hip Arthroscopy; Labral Repair; Rim Trim  Referred By: Dr. Errol CHAMBERS  DOS: 12/19/2024  PO: 13 weeks     Pain  Pain Assessment: 0-10  0-10 (Numeric) Pain Score: 0     Subjective:   Pt arrived to therapy reporting she had good follow up with ortho yesterday. Pt would like to progress strength     Performing HEP?: Yes        Objective:   HIP   force frame add/abd isometric (80%) refer to [employer]  Hip PROM   L hip IR (at 90): 30  L hip flexion: 120     Treatment Performed:     Therapeutic Exercise:                     40       min  Upright bike x 7'  Resisted side stepping blue TB loop 3x10  Half kneel hip flexor stretch 2x1'  Lateral lunge stretch 2x1'  90/90 stretch bilat x10  Isometric testing  TG single leg press  Lateral sled pulls followed by fwd sled pushes 10 yards  Cross over step up 12\" box  NT  SL Rebound Toss with 4# Ball 3x12 (fwd, sideways)  SL 24\" Box squat 4x8  SL Heel Taps on 4\" step (fwd/bwd)  SL [place] Paloff Press 13.6 Resistance 3x15        NT:    KB 20\" Goblet Squats 3x12  SL Leg Press 25# 3x12   S/L Hip Abduction 4-Way (fwd, bwd, pulsing, circles)  Modified Figure 4 stretch 2x1'  Lateral Cross-over Step ups 12\" box 1x12 no weight, 2x12 KB  TG SL press seat 1 no cords 3x12  Prone IR/ER x20  Prone resisted hip IR/ER blue " band 2x12 each  Fwd step up with contralateral knee drive pink KB 3x12  Lateral heel tap 6 inch box 3 x 12  SL RDL 13# 3 x 12  S/l hip abduction 3x10  S/l reverse clams 2.5# 3 x 12  Side stepping/monster walks with red RB 2 x 10 yd ea.  BFR 160mmHG 30-15-15-15  SL bridge >>> staggered bridge  S/l hip ABD 2.5#   Lateral heel tap 6 inch box 3 x 12  SL RDL 13# 3 x 12  S/l reverse clams 2.5# 3 x 12  Side stepping/monster walks with red RB 2 x 10 yd ea.  BW squats to plinth to 60 deg 3x10  TG Leg Press seat 0 1 slightly Staggered 3x10    Therapeutic Activity:                             min  NT  Treadmill walking SL x3' DL x3' speed 1.5mph     Manual Therapy:                            20       min  STM adductors, glutes, hip flexors, posterior hip rotators  LAD  Circumduction   Lateral hip distraction grade 3  Lateral hip distraction in 90 deg flex grade 3   MWM belt IR     NT  Prone PA with IR/ER   Long rolling     Neuromuscular Re-education:                        0  min        Therapeutic Modalities:                         min        Other:                                                  min              Assessment:   The focus of the session was strength, ROM, stretching, joint mobilization, soft tissue massage, balance, motor control, dynamic stability training, and functional training. The pt demonstrated good tolerance to the noted exercises today by reporting no pain with reported with improved ability to perform SL activities with 0/10 pain and had good results on isometric testing. The pt is demonstrated good progress in skilled rehab at this time. The pt is still limited in overall strength, ROM, flexibility, motor control, and balance at this time. The pt continues to be a good candidate for skilled PT, in order to further improve strength, ROM, flexibility, motor control, and balance.       Education: continue HEP as prescribed     Plan:  Progress per protocol. Incorporate more strengthening exercises.      Goals:  Active         PT Problem         PT Goal 1         Start:  12/20/24    Expected End:  06/20/25        Patient will report <4/10 pain in R hip by 4 weeks   PROM R hip flexion 90 deg by 3-4 weeks, 110 deg by 6 weeks, abduction 20 deg by 3 weeks, 40 deg by 6 weeks, ER 20 deg by 3 weeks, and IR 30 deg by 4-6 weeks to demonstrate improved joint mechanics to perform ADLs   LTG 12-16 weeks R hip strength: flexion, abduction, ER/IR, extension, glute max  >4+ to 5/5 MMT or 80% to demonstrate pelvic stability during ADLs and higher level functional tasks   Patient able to perform SL squat without valgus or anterior hip impingement to demonstrate LE biomechanics by 6- 8 weeks   LEFS > 30/80 to demonstrate improved ability to perform ADLs by 6 weeks

## 2025-03-25 ENCOUNTER — TREATMENT (OUTPATIENT)
Dept: PHYSICAL THERAPY | Facility: HOSPITAL | Age: 25
End: 2025-03-25
Payer: COMMERCIAL

## 2025-03-25 DIAGNOSIS — Z47.89 ENCOUNTER FOR OTHER ORTHOPEDIC AFTERCARE: ICD-10-CM

## 2025-03-25 DIAGNOSIS — S73.192D OTHER SPRAIN OF LEFT HIP, SUBSEQUENT ENCOUNTER: ICD-10-CM

## 2025-03-25 DIAGNOSIS — M25.652 HIP STIFFNESS, LEFT: ICD-10-CM

## 2025-03-25 DIAGNOSIS — R29.898 WEAKNESS OF LEFT HIP: ICD-10-CM

## 2025-03-25 DIAGNOSIS — M25.552 PAIN IN LEFT HIP: ICD-10-CM

## 2025-03-25 DIAGNOSIS — Z47.89 ORTHOPEDIC AFTERCARE: Primary | ICD-10-CM

## 2025-03-25 PROCEDURE — 97110 THERAPEUTIC EXERCISES: CPT | Mod: GP | Performed by: PHYSICAL THERAPIST

## 2025-03-25 PROCEDURE — 97140 MANUAL THERAPY 1/> REGIONS: CPT | Mod: GP | Performed by: PHYSICAL THERAPIST

## 2025-03-25 ASSESSMENT — PAIN SCALES - GENERAL: PAINLEVEL_OUTOF10: 0 - NO PAIN

## 2025-03-25 NOTE — PROGRESS NOTES
"Physical Therapy  Physical Therapy Treatment Note     Patient Name: Jessica Perez  MRN: [id number]  Today's Date: 3/25/2025       Insurance:  Visit number: 19 of 50 (18 total)  Authorization info: no auth and reset 1/1/2025           Insurance Type: [place] (no vaso)     Current Problem  1. Hip stiffness, left          2. Other sprain of left hip, subsequent encounter  Follow Up In Physical Therapy       3. Encounter for other orthopedic aftercare  Follow Up In Physical Therapy       4. Pain in left hip  Follow Up In Physical Therapy       5. Weakness of left hip          6. Orthopedic aftercare                   Precautions  No running     General  Reason for Referral: Left Hip Arthroscopy; Labral Repair; Rim Trim  Referred By: Dr. Errol CHAMBERS  DOS: 12/19/2024  PO: 13 weeks     Pain  Pain Assessment: 0-10  0-10 (Numeric) Pain Score: 0     Subjective:   Pt arrived to therapy reporting she is feeling good with 0/10 pain reported.    Performing HEP?: Yes        Objective:   HIP   force frame add/abd isometric (80%) refer to Ipad  Hip PROM   L hip IR (at 90): 30  L hip flexion: 120     Treatment Performed:     Therapeutic Exercise:                     40       min  Upright bike x 7'  Resisted side stepping blue TB loop 3x10  Half kneel hip flexor stretch 2x1'  Lateral lunge stretch 2x1'  90/90 stretch bilat x10  TG Plyometrics (DL, SL alternating, SL same x20 reps) x2 rounds  TG single leg press 3x12  Matrix Hamstring Curls   Elevated Reverse Lunge #10 3x10   KB Goblet Squats 3x12         NT:    KB 20\" Goblet Squats 3x12  SL Leg Press 25# 3x12   S/L Hip Abduction 4-Way (fwd, bwd, pulsing, circles)  Modified Figure 4 stretch 2x1'  Lateral Cross-over Step ups 12\" box 1x12 no weight, 2x12 KB  TG SL press seat 1 no cords 3x12  Prone IR/ER x20  Prone resisted hip IR/ER blue band 2x12 each  Fwd step up with contralateral knee drive pink KB 3x12  Lateral heel tap 6 inch box 3 x 12  SL RDL 13# 3 x 12  S/l hip abduction " "3x10  S/l reverse clams 2.5# 3 x 12  Side stepping/monster walks with red RB 2 x 10 yd ea.  SL Rebound Toss with 4# Ball 3x12 (fwd, sideways)  SL 24\" Box squat 4x8  SL Heel Taps on 4\" step (fwd/bwd)  SL [place] Paloff Press 13.6 Resistance 3x15  Therapeutic Activity:                             min   NT:  Treadmill walking SL x3' DL x3' speed 1.5mph     Manual Therapy:                            20       min  STM adductors, glutes, hip flexors, posterior hip rotators  LAD  Circumduction   Lateral hip distraction grade 3  Lateral hip distraction in 90 deg flex grade 3   MWM belt IR     NT  Prone PA with IR/ER   Long rolling     Neuromuscular Re-education:                        0  min        Therapeutic Modalities:                         min        Other:                                                  min              Assessment:   The focus of the session was strength, stretching, joint mobilization, soft tissue massage, balance, motor control, dynamic stability training, and functional training. The pt demonstrated good tolerance to the noted exercises today by reporting 0/10 pain with introduction to plyometric jumping activities. The pt is demonstrating good progression in skilled rehab at this time. The pt is still limited in overall strength, flexibility, motor control, balance, and dynamic stability  at this time. The pt continues to be a good candidate for skilled PT, in order to further improve strength, flexibility, motor control, and balance.      Education: continue HEP as prescribed     Plan:  Progress per protocol. Incorporate more strengthening and plyometric exercises.     Goals:  Active         PT Problem         PT Goal 1         Start:  12/20/24    Expected End:  06/20/25        Patient will report <4/10 pain in R hip by 4 weeks   PROM R hip flexion 90 deg by 3-4 weeks, 110 deg by 6 weeks, abduction 20 deg by 3 weeks, 40 deg by 6 weeks, ER 20 deg by 3 weeks, and IR 30 deg by 4-6 weeks to " demonstrate improved joint mechanics to perform ADLs   LTG 12-16 weeks R hip strength: flexion, abduction, ER/IR, extension, glute max  >4+ to 5/5 MMT or 80% to demonstrate pelvic stability during ADLs and higher level functional tasks   Patient able to perform SL squat without valgus or anterior hip impingement to demonstrate LE biomechanics by 6- 8 weeks   LEFS > 30/80 to demonstrate improved ability to perform ADLs by 6 weeks

## 2025-03-26 PROBLEM — M25.652 HIP STIFFNESS, LEFT: Status: ACTIVE | Noted: 2025-03-26

## 2025-03-27 ENCOUNTER — TREATMENT (OUTPATIENT)
Dept: PHYSICAL THERAPY | Facility: HOSPITAL | Age: 25
End: 2025-03-27
Payer: COMMERCIAL

## 2025-03-27 DIAGNOSIS — Z47.89 ENCOUNTER FOR OTHER ORTHOPEDIC AFTERCARE: ICD-10-CM

## 2025-03-27 DIAGNOSIS — R29.898 WEAKNESS OF LEFT HIP: ICD-10-CM

## 2025-03-27 DIAGNOSIS — S73.192D OTHER SPRAIN OF LEFT HIP, SUBSEQUENT ENCOUNTER: ICD-10-CM

## 2025-03-27 DIAGNOSIS — M25.652 HIP STIFFNESS, LEFT: Primary | ICD-10-CM

## 2025-03-27 DIAGNOSIS — M25.552 PAIN IN LEFT HIP: ICD-10-CM

## 2025-03-27 PROCEDURE — 97110 THERAPEUTIC EXERCISES: CPT | Mod: GP | Performed by: PHYSICAL THERAPIST

## 2025-03-27 PROCEDURE — 97140 MANUAL THERAPY 1/> REGIONS: CPT | Mod: GP | Performed by: PHYSICAL THERAPIST

## 2025-03-27 ASSESSMENT — PAIN SCALES - GENERAL: PAINLEVEL_OUTOF10: 0 - NO PAIN

## 2025-03-27 NOTE — PROGRESS NOTES
"Physical Therapy  Physical Therapy Treatment Note     Patient Name: Jessica Perez  MRN: [id number]  Today's Date: 3/27/2025       Insurance:  Visit number: 20 of 50 (18 total)  Authorization info: no auth and reset 1/1/2025           Insurance Type: [place] (no vaso)     Current Problem  1. Hip stiffness, left          2. Other sprain of left hip, subsequent encounter  Follow Up In Physical Therapy       3. Encounter for other orthopedic aftercare  Follow Up In Physical Therapy       4. Pain in left hip  Follow Up In Physical Therapy       5. Weakness of left hip          6. Orthopedic aftercare                   Precautions  No running     General  Reason for Referral: Left Hip Arthroscopy; Labral Repair; Rim Trim  Referred By: Dr. Errol CHAMBERS  DOS: 12/19/2024  PO: 13 weeks     Pain  Pain Assessment: 0-10  0-10 (Numeric) Pain Score: 0     Subjective:   Pt arrived to therapy reporting she is feeling good with 0/10 pain reported. Pt stated after introducing plyometrics last session she felt no pain afterwards.    Performing HEP?: Yes        Objective:   HIP   force frame add/abd isometric (80%) refer to [facility] [place]  Hip PROM   L hip IR (at 90): 30  L hip flexion: 120     Treatment Performed:     Therapeutic Exercise:                     45       min  Upright bike x 7'  Resisted side stepping blue TB loop 3x10  Half kneel hip flexor stretch 2x1'  Lateral lunge stretch 2x1'  90/90 stretch bilat x10  TG Plyometrics (DL, SL alternating, SL same x20 reps) x2 rounds  Bogo Banded Jumps (DL, SL alternating, SL same x20 reps) x3 rounds  [place] Resisted Training (Fwd, Bwd, Lateral) x5 rounds  Half Kneeling [place] Diagonal Chops 3x12  SL Paloff Press with #6 Ball 3x12  Barbell Hip Thrust with #10 3x8  NT:  KB 20\" Goblet Squats 3x12  SL Leg Press 25# 3x12   S/L Hip Abduction 4-Way (fwd, bwd, pulsing, circles)  Modified Figure 4 stretch 2x1'  Lateral Cross-over Step ups 12\" box 1x12 no weight, 2x12 KB  TG SL press seat 1 no cords " "3x12  Prone IR/ER x20  Prone resisted hip IR/ER blue band 2x12 each  Fwd step up with contralateral knee drive pink KB 3x12  Lateral heel tap 6 inch box 3 x 12  SL 24\" Box squat 4x8  SL Heel Taps on 4\" step (fwd/bwd)  SL [place] Paloff Press 13.6 Resistance 3x15  TG single leg press 3x12  Matrix Hamstring Curls   Elevated Reverse Lunge #10 3x10   KB Goblet Squats 3x12   Therapeutic Activity:                          0   min   NT:  Treadmill walking SL x3' DL x3' speed 1.5mph     Manual Therapy:                               20    min  STM adductors, glutes, hip flexors, posterior hip rotators  LAD  Circumduction   Lateral hip distraction grade 3  Lateral hip distraction in 90 deg flex grade 3   MWM belt IR     NT  Prone PA with IR/ER   Long rolling     Neuromuscular Re-education:                        0  min        Therapeutic Modalities:                         min        Other:                                                  min              Assessment:   The focus of the session was strength, ROM, stretching, joint mobilization, soft tissue massage, balance, motor control, dynamic stability training, and functional training. The pt demonstrated good tolerance to the noted exercises today by reporting 0/10 with increased contact jumping and SL dynamic stability exercises. The pt is demonstrated good progress in skilled rehab at this time. The pt is still limited in overall strength, flexibility, motor control, and balance at this time. The pt continues to be a good candidate for skilled PT, in order to further improve strength, ROM, motor control, and balance.   Education: continue HEP as prescribed     Plan:  Progress per protocol. Incorporate more strengthening and plyometric exercises.     Goals:  Active         PT Problem         PT Goal 1         Start:  12/20/24    Expected End:  06/20/25        Patient will report <4/10 pain in R hip by 4 weeks   PROM R hip flexion 90 deg by 3-4 weeks, 110 deg by 6 weeks, " abduction 20 deg by 3 weeks, 40 deg by 6 weeks, ER 20 deg by 3 weeks, and IR 30 deg by 4-6 weeks to demonstrate improved joint mechanics to perform ADLs   LTG 12-16 weeks R hip strength: flexion, abduction, ER/IR, extension, glute max  >4+ to 5/5 MMT or 80% to demonstrate pelvic stability during ADLs and higher level functional tasks   Patient able to perform SL squat without valgus or anterior hip impingement to demonstrate LE biomechanics by 6- 8 weeks   LEFS > 30/80 to demonstrate improved ability to perform ADLs by 6 weeks

## 2025-04-01 ENCOUNTER — APPOINTMENT (OUTPATIENT)
Dept: PHYSICAL THERAPY | Facility: HOSPITAL | Age: 25
End: 2025-04-01
Payer: COMMERCIAL

## 2025-04-03 ENCOUNTER — APPOINTMENT (OUTPATIENT)
Dept: PHYSICAL THERAPY | Facility: HOSPITAL | Age: 25
End: 2025-04-03
Payer: COMMERCIAL

## 2025-04-08 ENCOUNTER — APPOINTMENT (OUTPATIENT)
Dept: PHYSICAL THERAPY | Facility: HOSPITAL | Age: 25
End: 2025-04-08
Payer: COMMERCIAL

## 2025-04-10 ENCOUNTER — TREATMENT (OUTPATIENT)
Dept: PHYSICAL THERAPY | Facility: HOSPITAL | Age: 25
End: 2025-04-10
Payer: COMMERCIAL

## 2025-04-10 DIAGNOSIS — M25.652 HIP STIFFNESS, LEFT: Primary | ICD-10-CM

## 2025-04-10 DIAGNOSIS — Z47.89 ORTHOPEDIC AFTERCARE: ICD-10-CM

## 2025-04-10 DIAGNOSIS — Z47.89 ENCOUNTER FOR OTHER ORTHOPEDIC AFTERCARE: ICD-10-CM

## 2025-04-10 DIAGNOSIS — M25.552 PAIN IN LEFT HIP: ICD-10-CM

## 2025-04-10 DIAGNOSIS — M25.552 LEFT HIP PAIN: ICD-10-CM

## 2025-04-10 DIAGNOSIS — R29.898 WEAKNESS OF LEFT HIP: ICD-10-CM

## 2025-04-10 DIAGNOSIS — S73.192D OTHER SPRAIN OF LEFT HIP, SUBSEQUENT ENCOUNTER: ICD-10-CM

## 2025-04-10 PROCEDURE — 97140 MANUAL THERAPY 1/> REGIONS: CPT | Mod: GP | Performed by: PHYSICAL THERAPIST

## 2025-04-10 PROCEDURE — 97110 THERAPEUTIC EXERCISES: CPT | Mod: GP | Performed by: PHYSICAL THERAPIST

## 2025-04-11 NOTE — PROGRESS NOTES
"Physical Therapy  Physical Therapy Treatment Note     Patient Name: Jessica Perez  MRN: [id number]  Today's Date: 3/27/2025  Time in 1630   Time Out 1725     Insurance:  Visit number: 21 of 50 (19 total)  Authorization info: no auth and reset 1/1/2025           Insurance Type: [place] (no vaso)     Current Problem  1. Hip stiffness, left          2. Other sprain of left hip, subsequent encounter  Follow Up In Physical Therapy       3. Encounter for other orthopedic aftercare  Follow Up In Physical Therapy       4. Pain in left hip  Follow Up In Physical Therapy       5. Weakness of left hip          6. Orthopedic aftercare                   Precautions  No running     General  Reason for Referral: Left Hip Arthroscopy; Labral Repair; Rim Trim  Referred By: Dr. Errol CHAMBERS  DOS: 12/19/2024  POW: 16     Pain  Pain Assessment: 0-10  0-10 (Numeric) Pain Score: 0     Subjective:   Pt arrived to therapy after a long hiatus secondary to being sick. Pt states she feels stiff today because she hasn't been able to do much    Performing HEP?: Yes        Objective:   HIP    Hip PROM   L hip IR (at 90): 30  L hip flexion: 120     Treatment Performed:     Therapeutic Exercise:                     25       min  Upright bike x 7'  Resisted side stepping blue TB loop 3x10  Half kneel hip flexor stretch 2x1'  Lateral lunge stretch 2x1'  90/90 stretch bilat x10  Cross leg SL bridge 2x12  Modified side plank+clamshell OTB 2x12 each  NT:  KB 20\" Goblet Squats 3x12  SL Leg Press 25# 3x12   S/L Hip Abduction 4-Way (fwd, bwd, pulsing, circles)  Modified Figure 4 stretch 2x1'  Lateral Cross-over Step ups 12\" box 1x12 no weight, 2x12 KB  TG SL press seat 1 no cords 3x12  Prone IR/ER x20  Prone resisted hip IR/ER blue band 2x12 each  Fwd step up with contralateral knee drive pink KB 3x12  Lateral heel tap 6 inch box 3 x 12  SL 24\" Box squat 4x8  SL Heel Taps on 4\" step (fwd/bwd)  SL [place] Paloff Press 13.6 Resistance 3x15  TG single leg " press 3x12  Matrix Hamstring Curls   Elevated Reverse Lunge #10 3x10   KB Goblet Squats 3x12   Therapeutic Activity:                          0   min   NT:  Treadmill walking SL x3' DL x3' speed 1.5mph     Manual Therapy:                            35  min  STM adductors, glutes, hip flexors, posterior hip rotators  LAD NT  Circumduction   Lateral hip distraction grade 3  Lateral hip distraction in 90 deg flex grade 3   MWM belt IR     NT  Prone PA with IR/ER   Long rolling     Neuromuscular Re-education:                        0  min        Therapeutic Modalities:                         min        Other:                                                  min              Assessment:   The focus of the session was strength, ROM, stretching, joint mobilization, soft tissue massage, balance, motor control, dynamic stability training, and functional training. The pt demonstrated good tolerance to the noted exercises today by reporting 0/10 with increased contact jumping and SL dynamic stability exercises. The pt is demonstrated good progress in skilled rehab at this time. The pt is still limited in overall strength, flexibility, motor control, and balance at this time. The pt continues to be a good candidate for skilled PT, in order to further improve strength, ROM, motor control, and balance.   Education: continue HEP as prescribed     Plan:  Progress per protocol. Incorporate more strengthening and plyometric exercises.     Goals:  Active         PT Problem         PT Goal 1         Start:  12/20/24    Expected End:  06/20/25        Patient will report <4/10 pain in R hip by 4 weeks   PROM R hip flexion 90 deg by 3-4 weeks, 110 deg by 6 weeks, abduction 20 deg by 3 weeks, 40 deg by 6 weeks, ER 20 deg by 3 weeks, and IR 30 deg by 4-6 weeks to demonstrate improved joint mechanics to perform ADLs   LTG 12-16 weeks R hip strength: flexion, abduction, ER/IR, extension, glute max  >4+ to 5/5 MMT or 80% to demonstrate pelvic  stability during ADLs and higher level functional tasks   Patient able to perform SL squat without valgus or anterior hip impingement to demonstrate LE biomechanics by 6- 8 weeks   LEFS > 30/80 to demonstrate improved ability to perform ADLs by 6 weeks

## 2025-04-15 ENCOUNTER — TREATMENT (OUTPATIENT)
Dept: PHYSICAL THERAPY | Facility: HOSPITAL | Age: 25
End: 2025-04-15
Payer: COMMERCIAL

## 2025-04-15 DIAGNOSIS — Z47.89 ORTHOPEDIC AFTERCARE: ICD-10-CM

## 2025-04-15 DIAGNOSIS — M25.552 PAIN IN LEFT HIP: ICD-10-CM

## 2025-04-15 DIAGNOSIS — Z47.89 ENCOUNTER FOR OTHER ORTHOPEDIC AFTERCARE: ICD-10-CM

## 2025-04-15 DIAGNOSIS — M25.552 LEFT HIP PAIN: ICD-10-CM

## 2025-04-15 DIAGNOSIS — R29.898 WEAKNESS OF LEFT HIP: ICD-10-CM

## 2025-04-15 DIAGNOSIS — S73.192D OTHER SPRAIN OF LEFT HIP, SUBSEQUENT ENCOUNTER: ICD-10-CM

## 2025-04-15 DIAGNOSIS — M25.652 HIP STIFFNESS, LEFT: Primary | ICD-10-CM

## 2025-04-15 PROCEDURE — 97110 THERAPEUTIC EXERCISES: CPT | Mod: GP | Performed by: PHYSICAL THERAPIST

## 2025-04-15 PROCEDURE — 97140 MANUAL THERAPY 1/> REGIONS: CPT | Mod: GP | Performed by: PHYSICAL THERAPIST

## 2025-04-16 ASSESSMENT — PAIN - FUNCTIONAL ASSESSMENT: PAIN_FUNCTIONAL_ASSESSMENT: 0-10

## 2025-04-16 ASSESSMENT — PAIN SCALES - GENERAL: PAINLEVEL_OUTOF10: 0 - NO PAIN

## 2025-04-16 NOTE — PROGRESS NOTES
"Physical Therapy  Physical Therapy Treatment Note     Patient Name: Jessica Perez  MRN: [id number]  Today's Date: 4/15/2025  Time in 1600   Time Out 1700     Insurance:  Visit number: 22 of 50 (20 total)  Authorization info: no auth and reset 1/1/2025           Insurance Type: [place] (no vaso)     Current Problem  1. Hip stiffness, left          2. Other sprain of left hip, subsequent encounter  Follow Up In Physical Therapy       3. Encounter for other orthopedic aftercare  Follow Up In Physical Therapy       4. Pain in left hip  Follow Up In Physical Therapy       5. Weakness of left hip          6. Orthopedic aftercare                   Precautions  No running     General  Reason for Referral: Left Hip Arthroscopy; Labral Repair; Rim Trim  Referred By: Dr. Errol CHAMBERS  DOS: 12/19/2024  POW: 16     Pain  Pain Assessment: 0-10  0-10 (Numeric) Pain Score: 0     Subjective:   Pt arrived to therapy denying any new complaints. Pt reports she is less stiff than last visit    Performing HEP?: Yes        Objective:   HIP    Hip PROM   L hip IR (at 90): 35  L hip flexion: 120     Treatment Performed:     Therapeutic Exercise:                   40       min  Upright bike x 7'  Resisted side stepping blue TB loop 3x10  Half kneel hip flexor stretch 2x1'  Lateral lunge stretch 2x1'  90/90 stretch bilat x10  BB hip hinge over bench 65# 3x12  Hex bar DL 65# 4x8  TG single leg press 2 cords 4x8    NT:  KB 20\" Goblet Squats 3x12  SL Leg Press 25# 3x12   S/L Hip Abduction 4-Way (fwd, bwd, pulsing, circles)  Modified Figure 4 stretch 2x1'  Lateral Cross-over Step ups 12\" box 1x12 no weight, 2x12 KB  TG SL press seat 1 no cords 3x12  Prone IR/ER x20  Prone resisted hip IR/ER blue band 2x12 each  Fwd step up with contralateral knee drive pink KB 3x12  Lateral heel tap 6 inch box 3 x 12  SL 24\" Box squat 4x8  SL Heel Taps on 4\" step (fwd/bwd)  SL [place] Paloff Press 13.6 Resistance 3x15  TG single leg press 3x12  Matrix Hamstring " Curls   Elevated Reverse Lunge #10 3x10   KB Goblet Squats 3x12   Therapeutic Activity:                          0   min   NT:  Treadmill walking SL x3' DL x3' speed 1.5mph     Manual Therapy:                            20  min  STM adductors, glutes, hip flexors, posterior hip rotators  LAD NT  Circumduction   Lateral hip distraction grade 3  Lateral hip distraction in 90 deg flex grade 3   MWM belt IR     NT  Prone PA with IR/ER   Long rolling     Neuromuscular Re-education:                        0  min        Therapeutic Modalities:                         min        Other:                                                  min              Assessment:   The focus of the session was strength, ROM, stretching, joint mobilization, soft tissue massage, balance, motor control, dynamic stability training, and functional training. The pt demonstrated good tolerance to the noted exercises today by reporting 0/10 with increased contact jumping and SL dynamic stability exercises. The pt is demonstrated good progress in skilled rehab at this time. The pt is still limited in overall strength, flexibility, motor control, and balance at this time. The pt continues to be a good candidate for skilled PT, in order to further improve strength, ROM, motor control, and balance.   Education: continue HEP as prescribed     Plan:  Progress per protocol. Incorporate more strengthening and plyometric exercises.     Goals:  Active         PT Problem         PT Goal 1         Start:  12/20/24    Expected End:  06/20/25        Patient will report <4/10 pain in R hip by 4 weeks   PROM R hip flexion 90 deg by 3-4 weeks, 110 deg by 6 weeks, abduction 20 deg by 3 weeks, 40 deg by 6 weeks, ER 20 deg by 3 weeks, and IR 30 deg by 4-6 weeks to demonstrate improved joint mechanics to perform ADLs   LTG 12-16 weeks R hip strength: flexion, abduction, ER/IR, extension, glute max  >4+ to 5/5 MMT or 80% to demonstrate pelvic stability during ADLs and  higher level functional tasks   Patient able to perform SL squat without valgus or anterior hip impingement to demonstrate LE biomechanics by 6- 8 weeks   LEFS > 30/80 to demonstrate improved ability to perform ADLs by 6 weeks

## 2025-04-17 ENCOUNTER — TREATMENT (OUTPATIENT)
Dept: PHYSICAL THERAPY | Facility: HOSPITAL | Age: 25
End: 2025-04-17
Payer: COMMERCIAL

## 2025-04-17 DIAGNOSIS — M25.552 PAIN IN LEFT HIP: ICD-10-CM

## 2025-04-17 DIAGNOSIS — S73.192D OTHER SPRAIN OF LEFT HIP, SUBSEQUENT ENCOUNTER: ICD-10-CM

## 2025-04-17 DIAGNOSIS — Z47.89 ENCOUNTER FOR OTHER ORTHOPEDIC AFTERCARE: ICD-10-CM

## 2025-04-17 ASSESSMENT — PAIN SCALES - GENERAL: PAINLEVEL_OUTOF10: 0 - NO PAIN

## 2025-04-17 NOTE — PROGRESS NOTES
"Physical Therapy  Physical Therapy Treatment Note     Patient Name: Jessica Perez  MRN: [id number]  Today's Date: 4/17/2025  Time in 1600   Time Out 1700     Insurance:  Visit number: 23 of 50 (20 total)  Authorization info: no auth and reset 1/1/2025           Insurance Type: [place] (no vaso)     Current Problem  1. Hip stiffness, left          2. Other sprain of left hip, subsequent encounter  Follow Up In Physical Therapy       3. Encounter for other orthopedic aftercare  Follow Up In Physical Therapy       4. Pain in left hip  Follow Up In Physical Therapy       5. Weakness of left hip          6. Orthopedic aftercare                   Precautions  No running     General  Reason for Referral: Left Hip Arthroscopy; Labral Repair; Rim Trim  Referred By: Dr. Errol CHAMBERS  DOS: 12/19/2024  POW: 16     Pain  Pain Assessment: 0-10  0-10 (Numeric) Pain Score: 0     Subjective:   Pt arrived to therapy reporting no increase in sx's and 0/10 pain in hip.    Performing HEP?: Yes        Objective:   HIP    Hip PROM   L hip IR (at 90): 35  L hip flexion: 120     Treatment Performed:     Therapeutic Exercise:                      37    min  Upright bike x 7'  Resisted side stepping blue TB loop 3x10  Half kneel hip flexor stretch 2x1'  90/90 stretch bilat x10  Prone Quad stretch 3x1'  Heel elevated KB Goblet Squats 3x12  Bird Dogs on Ball 3x8    NOT TODAY  KB 20\" Goblet Squats 3x12  SL Leg Press 25# 3x12   S/L Hip Abduction 4-Way (fwd, bwd, pulsing, circles)  Modified Figure 4 stretch 2x1'  Lateral Cross-over Step ups 12\" box 1x12 no weight, 2x12 KB  TG SL press seat 1 no cords 3x12  Prone IR/ER x20  Prone resisted hip IR/ER blue band 2x12 each  Fwd step up with contralateral knee drive pink KB 3x12  Lateral heel tap 6 inch box 3 x 12  SL 24\" Box squat 4x8  SL Heel Taps on 4\" step (fwd/bwd)  SL [place] Paloff Press 13.6 Resistance 3x15  TG single leg press 3x12  Hex bar DL 65# 4x8  Therapeutic Activity:                         "  15   min  Pogo Plyometric jumps with purple band (DL, Alt, SL) 2x20  Treadmill walk/jog program 4' min walk at 3.0 mph /1' jog 4.6 mph x2 rounds     Manual Therapy:                           20    min  STM adductors, glutes, hip flexors, posterior hip rotators  LAD NT  Circumduction   Lateral hip distraction grade 3  Lateral hip distraction in 90 deg flex grade 3   MWM belt IR     NT  Prone PA with IR/ER   Long rolling     Neuromuscular Re-education:                        0  min        Therapeutic Modalities:                         min        Other:                                                  min              Assessment:   The focus of the session was strength, ROM, stretching, joint mobilization, soft tissue massage, balance, motor control, dynamic stability training, and functional training. The pt demonstrated good tolerance to the noted exercises today by reporting no pain with introduction to walk/jog program with normal gait mechanics and no noticeable limp. The pt is demonstrating good progression in skilled rehab at this time. The pt is still limited in overall strength, ROM, motor control, and balance at this time. The pt continues to be a good candidate for skilled PT, in order to further improve strength, ROM, flexibility, motor control, and balance.      Plan:  Progress per protocol. Incorporate more strengthening and plyometric exercises.     Goals:  Active         PT Problem         PT Goal 1         Start:  12/20/24    Expected End:  06/20/25        Patient will report <4/10 pain in R hip by 4 weeks   PROM R hip flexion 90 deg by 3-4 weeks, 110 deg by 6 weeks, abduction 20 deg by 3 weeks, 40 deg by 6 weeks, ER 20 deg by 3 weeks, and IR 30 deg by 4-6 weeks to demonstrate improved joint mechanics to perform ADLs   LTG 12-16 weeks R hip strength: flexion, abduction, ER/IR, extension, glute max  >4+ to 5/5 MMT or 80% to demonstrate pelvic stability during ADLs and higher level functional tasks    Patient able to perform SL squat without valgus or anterior hip impingement to demonstrate LE biomechanics by 6- 8 weeks   LEFS > 30/80 to demonstrate improved ability to perform ADLs by 6 weeks

## 2025-04-22 ENCOUNTER — TREATMENT (OUTPATIENT)
Dept: PHYSICAL THERAPY | Facility: HOSPITAL | Age: 25
End: 2025-04-22
Payer: COMMERCIAL

## 2025-04-22 DIAGNOSIS — Z47.89 ENCOUNTER FOR OTHER ORTHOPEDIC AFTERCARE: ICD-10-CM

## 2025-04-22 DIAGNOSIS — M25.652 HIP STIFFNESS, LEFT: Primary | ICD-10-CM

## 2025-04-22 DIAGNOSIS — S73.192D OTHER SPRAIN OF LEFT HIP, SUBSEQUENT ENCOUNTER: ICD-10-CM

## 2025-04-22 DIAGNOSIS — M25.552 PAIN IN LEFT HIP: ICD-10-CM

## 2025-04-22 DIAGNOSIS — R29.898 WEAKNESS OF LEFT HIP: ICD-10-CM

## 2025-04-22 DIAGNOSIS — Z47.89 ORTHOPEDIC AFTERCARE: ICD-10-CM

## 2025-04-22 PROCEDURE — 97110 THERAPEUTIC EXERCISES: CPT | Mod: GP | Performed by: PHYSICAL THERAPIST

## 2025-04-22 PROCEDURE — 97140 MANUAL THERAPY 1/> REGIONS: CPT | Mod: GP | Performed by: PHYSICAL THERAPIST

## 2025-04-22 PROCEDURE — 97530 THERAPEUTIC ACTIVITIES: CPT | Mod: GP | Performed by: PHYSICAL THERAPIST

## 2025-04-22 ASSESSMENT — PAIN SCALES - GENERAL: PAINLEVEL_OUTOF10: 0 - NO PAIN

## 2025-04-22 NOTE — PROGRESS NOTES
"Physical Therapy  Physical Therapy Treatment Note     Patient Name: Jessica Perez  MRN: [id number]  Today's Date: 4/22/2025    Insurance:  Visit number: 24 of 50 (20 total)  Authorization info: no auth and reset 1/1/2025           Insurance Type: [place] (no vaso)     Current Problem  1. Hip stiffness, left          2. Other sprain of left hip, subsequent encounter  Follow Up In Physical Therapy       3. Encounter for other orthopedic aftercare  Follow Up In Physical Therapy       4. Pain in left hip  Follow Up In Physical Therapy       5. Weakness of left hip          6. Orthopedic aftercare                   Precautions  No running     General  Reason for Referral: Left Hip Arthroscopy; Labral Repair; Rim Trim  Referred By: Dr. Errol CHAMBERS  DOS: 12/19/2024  POW: 16     Pain  Pain Assessment: 0-10  0-10 (Numeric) Pain Score: 0     Subjective:   Pt arrived to therapy reporting no pain stating she felt slightly sore after running last session but significant changes in status.    Performing HEP?: Yes        Objective:   HIP    Hip PROM   L hip IR (at 90): 35  L hip flexion: 120     Treatment Performed:     Therapeutic Exercise:                     35     min  Upright bike x 7' ---  Resisted side stepping blue TB loop 3x10  Half kneel hip flexor stretch 2x1'  90/90 stretch bilat x10  Prone Quad stretch 3x1'  Lateral Cross-over Step ups 12\" box pink KB 3x8  Bird Dogs on Ball 2x8 #5  Russian twists with #6 ball 3x10    NOT TODAY  KB 20\" Goblet Squats 3x12  SL Leg Press 25# 3x12   S/L Hip Abduction 4-Way (fwd, bwd, pulsing, circles)  Modified Figure 4 stretch 2x1'  Lateral Cross-over Step ups 12\" box 1x12 no weight, 2x12 KB  TG SL press seat 1 no cords 3x12  Prone IR/ER x20  Prone resisted hip IR/ER blue band 2x12 each  Fwd step up with contralateral knee drive pink KB 3x12  Lateral heel tap 6 inch box 3 x 12  SL 24\" Box squat 4x8  SL Heel Taps on 4\" step (fwd/bwd)  SL [place] Paloff Press 13.6 Resistance 3x15  TG single " leg press 3x12  Hex bar DL 65# 4x8  Therapeutic Activity:                  30           min  Pogo Plyometric jumps with purple band (DL, Alt, SL) 2x20   Squat Jumps 2x8  Fwd Decels with SL Landing 2x6   Overground step holds 2x6 (fwd, lateral)  Treadmill walk/jog program 1' min walk at 2.6 mph /1' jog 4.6 mph x5 rounds      Manual Therapy:                             20  min  STM adductors, glutes, hip flexors, posterior hip rotators  LAD NT  Circumduction   Lateral hip distraction grade 3  Lateral hip distraction in 90 deg flex grade 3   MWM belt IR     NT  Prone PA with IR/ER   Long rolling     Neuromuscular Re-education:                        0  min        Therapeutic Modalities:                         min        Other:                                                  min              Assessment:   The focus of the session was strength, ROM, stretching, joint mobilization, soft tissue massage, balance, motor control, dynamic stability training, and functional training. The pt demonstrated good tolerance to the noted exercises today by reporting no pain with increasing jog to walk ratio to 1:1. Pt was able to handle more foot contacts with plyometric loading reporting no pain with exercises. Pt also focused on hip strengthening and core stability exercises increasing load with no pain noted. The pt is demonstrated good progress in skilled rehab at this time. The pt is still limited in overall strength, flexibility, motor control, and balance at this time. The pt continues to be a good candidate for skilled PT, in order to further improve strength, flexibility, motor control, and balance.      Plan:  Progress per protocol. Incorporate more strengthening and plyometric exercises.     Goals:  Active         PT Problem         PT Goal 1         Start:  12/20/24    Expected End:  06/20/25        Patient will report <4/10 pain in R hip by 4 weeks   PROM R hip flexion 90 deg by 3-4 weeks, 110 deg by 6 weeks, abduction  20 deg by 3 weeks, 40 deg by 6 weeks, ER 20 deg by 3 weeks, and IR 30 deg by 4-6 weeks to demonstrate improved joint mechanics to perform ADLs   LTG 12-16 weeks R hip strength: flexion, abduction, ER/IR, extension, glute max  >4+ to 5/5 MMT or 80% to demonstrate pelvic stability during ADLs and higher level functional tasks   Patient able to perform SL squat without valgus or anterior hip impingement to demonstrate LE biomechanics by 6- 8 weeks   LEFS > 30/80 to demonstrate improved ability to perform ADLs by 6 weeks

## 2025-04-23 ENCOUNTER — APPOINTMENT (OUTPATIENT)
Dept: PRIMARY CARE | Facility: CLINIC | Age: 25
End: 2025-04-23
Payer: COMMERCIAL

## 2025-04-24 ENCOUNTER — TREATMENT (OUTPATIENT)
Dept: PHYSICAL THERAPY | Facility: HOSPITAL | Age: 25
End: 2025-04-24
Payer: COMMERCIAL

## 2025-04-24 DIAGNOSIS — Z47.89 ENCOUNTER FOR OTHER ORTHOPEDIC AFTERCARE: ICD-10-CM

## 2025-04-24 DIAGNOSIS — M25.552 PAIN IN LEFT HIP: ICD-10-CM

## 2025-04-24 DIAGNOSIS — S73.192D OTHER SPRAIN OF LEFT HIP, SUBSEQUENT ENCOUNTER: ICD-10-CM

## 2025-04-24 NOTE — PROGRESS NOTES
"Physical Therapy  Physical Therapy Treatment Note     Patient Name: Jessica Perez  MRN: [id number]  Today's Date: 4/22/2025    Insurance:  Visit number: 24 of 50 (20 total)  Authorization info: no auth and reset 1/1/2025           Insurance Type: [place] (no vaso)     Current Problem  1. Hip stiffness, left          2. Other sprain of left hip, subsequent encounter  Follow Up In Physical Therapy       3. Encounter for other orthopedic aftercare  Follow Up In Physical Therapy       4. Pain in left hip  Follow Up In Physical Therapy       5. Weakness of left hip          6. Orthopedic aftercare                   Precautions  No running     General  Reason for Referral: Left Hip Arthroscopy; Labral Repair; Rim Trim  Referred By: Dr. Errol CHAMBERS  DOS: 12/19/2024  POW: 16     Pain  Pain Assessment: 0-10  0-10 (Numeric) Pain Score: 0     Subjective:   Pt arrived to therapy reporting no pain stating she felt slightly sore after running last session but significant changes in status.    Performing HEP?: Yes        Objective:   HIP    Hip PROM   L hip IR (at 90): 35  L hip flexion: 120     Treatment Performed:     Therapeutic Exercise:                          min  Upright bike x 7' ---  Resisted side stepping blue TB loop 3x10---  Half kneel hip flexor stretch 2x1'--  90/90 stretch bilat x10---  Prone Quad stretch 3x1'----  Lateral Cross-over Step ups 12\" box pink KB 3x8---  Goblet squat 4x8 ---  Fwd lunge with rotations #8 ball 3x10--  [place] Half kneel presses 3x12---  SL Squat with Ball against wall---  Bird dogs #5 3x12---  Plank with ball knee flexion ---    NOT TODAY  KB 20\" Goblet Squats 3x12  SL Leg Press 25# 3x12   S/L Hip Abduction 4-Way (fwd, bwd, pulsing, circles)  Modified Figure 4 stretch 2x1'  Lateral Cross-over Step ups 12\" box 1x12 no weight, 2x12 KB  TG SL press seat 1 no cords 3x12  Prone IR/ER x20  Prone resisted hip IR/ER blue band 2x12 each  Fwd step up with contralateral knee drive pink KB " "3x12  Lateral heel tap 6 inch box 3 x 12  SL 24\" Box squat 4x8  SL Heel Taps on 4\" step (fwd/bwd)  SL [place] Paloff Press 13.6 Resistance 3x15  TG single leg press 3x12  Hex bar DL 65# 4x8  Therapeutic Activity:                  30           min  Pogo Plyometric jumps with purple band (DL, Alt, SL) 2x20   Squat Jumps 2x8  Fwd Decels with SL Landing 2x6   Overground step holds 2x6 (fwd, lateral)  Treadmill walk/jog program 1' min walk at 2.6 mph /1' jog 4.6 mph x5 rounds      Manual Therapy:                             20  min  STM adductors, glutes, hip flexors, posterior hip rotators  LAD NT  Circumduction   Lateral hip distraction grade 3  Lateral hip distraction in 90 deg flex grade 3   MWM belt IR     NT  Prone PA with IR/ER   Long rolling     Neuromuscular Re-education:                        0  min        Therapeutic Modalities:                         min        Other:                                                  min              Assessment:   The focus of the session was {TB Treatment:22358}. The pt demonstrated {TB Tolerance to Rehab:13163} tolerance to the noted exercises today by ***. The pt is demonstrated {TB Tolerance to Rehab:52309} progress in skilled rehab at this time. The pt is still limited in overall {TB Limitations:54636} at this time. The pt continues to be a good candidate for skilled PT, in order to further improve {TB Limitations:64380}.      Plan:  Progress per protocol. Incorporate more strengthening and plyometric exercises.     Goals:  Active         PT Problem         PT Goal 1         Start:  12/20/24    Expected End:  06/20/25        Patient will report <4/10 pain in R hip by 4 weeks   PROM R hip flexion 90 deg by 3-4 weeks, 110 deg by 6 weeks, abduction 20 deg by 3 weeks, 40 deg by 6 weeks, ER 20 deg by 3 weeks, and IR 30 deg by 4-6 weeks to demonstrate improved joint mechanics to perform ADLs   LTG 12-16 weeks R hip strength: flexion, abduction, ER/IR, extension, glute max  >4+ " to 5/5 MMT or 80% to demonstrate pelvic stability during ADLs and higher level functional tasks   Patient able to perform SL squat without valgus or anterior hip impingement to demonstrate LE biomechanics by 6- 8 weeks   LEFS > 30/80 to demonstrate improved ability to perform ADLs by 6 weeks

## 2025-04-25 NOTE — PROGRESS NOTES
"Physical Therapy  Physical Therapy Treatment Note     Patient Name: Jessica Perez  MRN: [id number]  Today's Date: 4/22/2025    Insurance:  Visit number: 24 of 50 (20 total)  Authorization info: no auth and reset 1/1/2025           Insurance Type: [place] (no vaso)     Current Problem  1. Hip stiffness, left          2. Other sprain of left hip, subsequent encounter  Follow Up In Physical Therapy       3. Encounter for other orthopedic aftercare  Follow Up In Physical Therapy       4. Pain in left hip  Follow Up In Physical Therapy       5. Weakness of left hip          6. Orthopedic aftercare                   Precautions  No running     General  Reason for Referral: Left Hip Arthroscopy; Labral Repair; Rim Trim  Referred By: Dr. Errol CHAMBERS  DOS: 12/19/2024  POW: 16     Pain  Pain Assessment: 0-10  0-10 (Numeric) Pain Score: 0     Subjective:   Pt arrived to therapy reporting no pain, however is sore from working out yesterday and would like to work on stretching and mobility.     Performing HEP?: Yes        Objective:   HIP    Hip PROM   L hip IR (at 90): 35  L hip flexion: 120     Treatment Performed:     Therapeutic Exercise:                     50     min  Upright bike x 7' ---  Resisted side stepping blue TB loop 3x10  Half kneel hip flexor stretch 2x1'  90/90 stretch bilat x10  Prone Quad stretch 3x1'  Hamstring Strap Stretch 3x1'  Supine Figure 4 Piriformis Stretch 3x1'  Half Kneeling Open Books Against Wall 3x12  Quadruped Hip Cars 3x12    NOT TODAY  KB 20\" Goblet Squats 3x12  SL Leg Press 25# 3x12   S/L Hip Abduction 4-Way (fwd, bwd, pulsing, circles)  Lateral Cross-over Step ups 12\" box 1x12 no weight, 2x12 KB  TG SL press seat 1 no cords 3x12  Prone IR/ER x20  Prone resisted hip IR/ER blue band 2x12 each  Fwd step up with contralateral knee drive pink KB 3x12  Lateral heel tap 6 inch box 3 x 12  SL 24\" Box squat 4x8  SL Heel Taps on 4\" step (fwd/bwd)  SL [place] Paloff Press 13.6 Resistance 3x15  TG " single leg press 3x12  Hex bar DL 65# 4x8  Therapeutic Activity:                             min  Not today:  Pogo Plyometric jumps with purple band (DL, Alt, SL) 2x20   Squat Jumps 2x8  Fwd Decels with SL Landing 2x6   Overground step holds 2x6 (fwd, lateral)  Treadmill walk/jog program 1' min walk at 2.6 mph /1' jog 4.6 mph x5 rounds      Manual Therapy:                             20  min  STM adductors, glutes, hip flexors, posterior hip rotators  LAD NT  Circumduction   Lateral hip distraction grade 3  Lateral hip distraction in 90 deg flex grade 3   MWM belt IR     NT  Prone PA with IR/ER   Long rolling     Neuromuscular Re-education:                        0  min        Therapeutic Modalities:                         min        Other:                                                  min              Assessment:   The focus of the session was strength, ROM, stretching, joint mobilization, soft tissue massage, and motor control. The pt demonstrated good tolerance to the noted exercises today by no pain with exercises to target dynamic hip mobility, strength, and stretching . The pt is demonstrating good progression in skilled rehab at this time. The pt is still limited in overall strength, ROM, flexibility, motor control, balance, and pain at this time. The pt continues to be a good candidate for skilled PT, in order to further improve strength, ROM, flexibility, motor control, balance, and pain.   Plan:  Progress per protocol. Incorporate more strengthening and plyometric exercises.     Goals:  Active         PT Problem         PT Goal 1         Start:  12/20/24    Expected End:  06/20/25        Patient will report <4/10 pain in R hip by 4 weeks   PROM R hip flexion 90 deg by 3-4 weeks, 110 deg by 6 weeks, abduction 20 deg by 3 weeks, 40 deg by 6 weeks, ER 20 deg by 3 weeks, and IR 30 deg by 4-6 weeks to demonstrate improved joint mechanics to perform ADLs   LTG 12-16 weeks R hip strength: flexion, abduction,  ER/IR, extension, glute max  >4+ to 5/5 MMT or 80% to demonstrate pelvic stability during ADLs and higher level functional tasks   Patient able to perform SL squat without valgus or anterior hip impingement to demonstrate LE biomechanics by 6- 8 weeks   LEFS > 30/80 to demonstrate improved ability to perform ADLs by 6 weeks

## 2025-05-01 ENCOUNTER — APPOINTMENT (OUTPATIENT)
Dept: PHYSICAL THERAPY | Facility: HOSPITAL | Age: 25
End: 2025-05-01
Payer: COMMERCIAL

## 2025-05-06 ENCOUNTER — TREATMENT (OUTPATIENT)
Dept: PHYSICAL THERAPY | Facility: HOSPITAL | Age: 25
End: 2025-05-06
Payer: COMMERCIAL

## 2025-05-06 DIAGNOSIS — M25.552 PAIN IN LEFT HIP: ICD-10-CM

## 2025-05-06 DIAGNOSIS — Z47.89 ENCOUNTER FOR OTHER ORTHOPEDIC AFTERCARE: ICD-10-CM

## 2025-05-06 DIAGNOSIS — S73.192D OTHER SPRAIN OF LEFT HIP, SUBSEQUENT ENCOUNTER: ICD-10-CM

## 2025-05-06 PROCEDURE — 97110 THERAPEUTIC EXERCISES: CPT | Mod: GP | Performed by: PHYSICAL THERAPIST

## 2025-05-06 PROCEDURE — 97140 MANUAL THERAPY 1/> REGIONS: CPT | Mod: GP | Performed by: PHYSICAL THERAPIST

## 2025-05-06 ASSESSMENT — PAIN SCALES - GENERAL: PAINLEVEL_OUTOF10: 0 - NO PAIN

## 2025-05-06 NOTE — PROGRESS NOTES
Nutrition Follow Up Assessment:     Patient Jessica Perez is a 24 y.o. female being seen via virtual visit  who was initially referred by RAJAN Tang on 11/6/24 for   1. Dietary counseling and surveillance        2. Obesity (BMI 30.0-34.9)          Last nutrition visit was completed on 1/29/25 with previous goals set by RDN & pt:   The patient will use the Plate Method to guide the amount/type of food to eat at meals.  The patient will eat at least 3 times per day and will add snacks between meals if feeling hungry.    Virtual or Telephone Consent    An interactive audio and video telecommunication system which permits real time communications between the patient (at the originating site) and provider (at the distant site) was utilized to provide this telehealth service.   Verbal consent was requested and obtained from Jessica Perez on this date, 05/09/25 for a telehealth visit and the patient's location was confirmed at the time of the visit.  Nutrition Assessment      Nutrition History:  Food & Nutrition History: Initially after we met in January her eating habits were improved but then her schedule was busy with travel and the habits fell away. Barriers to balanced eating have been the amount of time/energy it takes to plan, shop and prep meals, and also the cost of healthy food is higher. She said she's been consistent about using corn tortillas instead of flour.  Food Allergies:  (none);  Food Intolerances: no intolerances, but some preferences: dislikes fish, peppers, tomatoes, mushrooms, yogurt. Likes: corn, peas, broccoli, carrots, onion, beans, salads.    Diet Recall:  Meal 1: B: was eating 2 eggs with toast for a while, more recently has been a protein bar and coffee with protein shake added to it for creamer. The bar/coffee is less satisfying compared to eggs.  Meal 2: L: continues to pack lunch most days - salad with fruit, or leftover pasta  Meal 3: D: this week didn't cook  meals, used frozen food  Snack 3: HS: sometimes a light snack  Fluid Intake: water, no energy drinks.  Energy Intake: Good > 75 %    Food Preparation:  Cooking: Patient, Spouse/Significant Other  Grocery Shopping: Patient, Spouse/Significant Other    Physical Activity:   She remains in PT and has been able to lift weights and run again, although exercise has been minimal in the last 2 weeks due to returning from vacation. Is working on regaining conditioning with strength.    Food Security/Insecurity: Food / Nutrition Program Participation:  (the cost of fresh produce has been a barrier to eating as much of it)    Anthropometrics:  No updated weight    Weight History:   Daily Weight  01/20/25 : 105 kg (231 lb)  12/19/24 : 101 kg (222 lb 7.1 oz)  11/06/24 : 106 kg (234 lb)  10/23/24 : 102 kg (225 lb)  04/25/24 : 102 kg (224 lb 6.9 oz)  11/22/23 : 104 kg (230 lb)  11/14/23 : 105 kg (231 lb)  07/27/23 : 98 kg (216 lb)  11/04/22 : 93.9 kg (207 lb)  11/19/21 : 103 kg (228 lb)    Nutrition Focused Physical Exam Findings:  Defer due to virtual visit    Nutrition Significant Labs:  No new labs since our last visit     Medications:  Current Outpatient Medications   Medication Instructions    cetirizine (ZyrTEC) 10 mg tablet Take by mouth.    EPINEPHrine 0.3 mg/0.3 mL injection syringe INJECT INTRAMUSCULARLY FOR SYMPTOMS OF ANAPHYLAXIS. THEN CALL 911    etonogestreL-ethinyl estradioL (Nuvaring) 0.12-0.015 mg/24 hr vaginal ring 1 each, vaginal, Every 28 days    triamcinolone (Kenalog) 0.1 % cream Topical, 2 times daily      Nutrition Diagnosis   Malnutrition Diagnosis  Patient has Malnutrition Diagnosis: No    Nutrition Diagnosis  Patient has Nutrition Diagnosis: Yes  Diagnosis Status (1): Active  Nutrition Diagnosis 1: Food and nutrition related knowledge deficit  Related to (1): lack of adequate exposure to nutrition-related information  As Evidenced by (1): she has questions about changing her diet to facilitate weight loss  without feeling overly restrictive.     Nutrition Interventions/Recommendations   Nutrition Prescription: Oral nutrition Regular diet    Nutrition Interventions:   Food and Nutrient Delivery: Meals & Snacks: Modification of diet for specific food and/or ingredient  Goals: 1. She will use convenient foods for some meals, aiming to be selective about the type / amount of them, and applying the Plate Method when choosing them. 2. She will prepare 2-3 meals/week with the intention to use leftovers from the meals at other meals, such as lunches     Coordination of Care: Collaboration and referral of nutrition care:  (N/A)     Nutrition Education:   Nutrition Education Content: Content related nutrition education    Educational Handouts Provided: healthy convenient foods, healthy frozen meals - emailed and will also mail    Nutrition Counseling:   Nutrition Counseling Strategies : Nutrition counseling based on goal setting strategy    Readiness to Change : Good  Level of Understanding : Good  Anticipated Compliant : Good         Nutrition Monitoring and Evaluation   Food and Nutrient Intake  Monitoring and Evaluation Plan: Amount of food, Meal/snack pattern  Amount of Food: Estimated amout of food  Criteria: plate method  Meal/Snack Pattern: Estimated meal and snack pattern  Criteria: 3/day + snacks as needed    Knowledge Belief Attitude Determination   Monitoring and Evaluation Plan: Nutrition related activities of daily living and instrumental activities of daily living  Criteria: Cooking 2-3 dinners/week                        Follow Up: 8/8 at 1 pm virtual visit    Time Spent  Prep time on day of patient encounter: 5 minutes  Time spent directly with patient, family or caregiver: 30 minutes  Additional Time Spent on Patient Care Activities: 0 minutes  Documentation Time: 7 minutes  Other Time Spent: 0 minutes  Total: 42 minutes

## 2025-05-06 NOTE — PROGRESS NOTES
"Physical Therapy  Physical Therapy Treatment Note     Patient Name: Jessica Perez  MRN: [id number]  Today's Date: 5/6/2025    Insurance:  Visit number: 25 of 50 (20 total)  Authorization info: no auth and reset 1/1/2025           Insurance Type: [place] (no vaso)     Current Problem  1. Hip stiffness, left          2. Other sprain of left hip, subsequent encounter  Follow Up In Physical Therapy       3. Encounter for other orthopedic aftercare  Follow Up In Physical Therapy       4. Pain in left hip  Follow Up In Physical Therapy       5. Weakness of left hip          6. Orthopedic aftercare                   Precautions  No running     General  Reason for Referral: Left Hip Arthroscopy; Labral Repair; Rim Trim  Referred By: Dr. Errol CHAMBERS  DOS: 12/19/2024  POW: 16     Pain  Pain Assessment: 0-10  0-10 (Numeric) Pain Score: 0     Subjective:   Pt arrived to therapy reporting she is doing well with 0/10 pain. Pt did report minor 2/10 hip soreness.    Performing HEP?: Yes        Objective:   HIP    Hip PROM   L hip IR (at 90): 35  L hip flexion: 120     Treatment Performed:     Therapeutic Exercise:                     40     min  Upright bike x 7'   Resisted side stepping blue TB loop 3x10  Half kneel hip flexor stretch 2x1'  90/90 stretch bilat x10  [place] Half kneeling shoulder presses 4x6  Lateral Cross-over Step ups 12\" 3x12   Lateral lunge with sliders 3x8    NOT TODAY  KB 20\" Goblet Squats 3x12  SL Leg Press 25# 3x12   S/L Hip Abduction 4-Way (fwd, bwd, pulsing, circles)  TG SL press seat 1 no cords 3x12  Prone IR/ER x20  Prone resisted hip IR/ER blue band 2x12 each  Lateral heel tap 6 inch box 3 x 12  SL 24\" Box squat 4x8  SL Heel Taps on 4\" step (fwd/bwd)  TG single leg press 3x12  Hex bar DL 65# 4x8  Prone Quad stretch 3x1'  Hamstring Strap Stretch 3x1'  Supine Figure 4 Piriformis Stretch 3x1'  Half Kneeling Open Books Against Wall 3x12  Quadruped Hip Cars 3x12  Therapeutic Activity:                       "       min  Not today:  Pogo Plyometric jumps with purple band (DL, Alt, SL) 2x20   Squat Jumps 2x8  Fwd Decels with SL Landing 2x6   Overground step holds 2x6 (fwd, lateral)  Treadmill walk/jog program 1' min walk at 2.6 mph /1' jog 4.6 mph x5 rounds      Manual Therapy:                                20  min  STM adductors, glutes, hip flexors, posterior hip rotators  LAD NT  Circumduction   Lateral hip distraction grade 3  Lateral hip distraction in 90 deg flex grade 3   MWM belt IR   NT  Prone PA with IR/ER   Long rolling     Neuromuscular Re-education:                        0  min        Therapeutic Modalities:                         min        Other:                                                  min              Assessment:   The focus of the session was strength, ROM, stretching, joint mobilization, soft tissue massage, and motor control. The pt demonstrated good tolerance to the noted exercises today by no pain with exercises to challenge dynamic hip and knee stability and strength  . The pt is demonstrating good progression in skilled rehab at this time. The pt is still limited in overall strength, ROM, flexibility, motor control, balance, and pain at this time. The pt continues to be a good candidate for skilled PT, in order to further improve strength, ROM, flexibility, motor control, balance, and pain.   Plan:  Progress per protocol. Incorporate more strengthening and plyometric exercises.     Goals:  Active         PT Problem         PT Goal 1         Start:  12/20/24    Expected End:  06/20/25        Patient will report <4/10 pain in R hip by 4 weeks   PROM R hip flexion 90 deg by 3-4 weeks, 110 deg by 6 weeks, abduction 20 deg by 3 weeks, 40 deg by 6 weeks, ER 20 deg by 3 weeks, and IR 30 deg by 4-6 weeks to demonstrate improved joint mechanics to perform ADLs   LTG 12-16 weeks R hip strength: flexion, abduction, ER/IR, extension, glute max  >4+ to 5/5 MMT or 80% to demonstrate pelvic stability  during ADLs and higher level functional tasks   Patient able to perform SL squat without valgus or anterior hip impingement to demonstrate LE biomechanics by 6- 8 weeks   LEFS > 30/80 to demonstrate improved ability to perform ADLs by 6 weeks

## 2025-05-08 ENCOUNTER — TREATMENT (OUTPATIENT)
Dept: PHYSICAL THERAPY | Facility: HOSPITAL | Age: 25
End: 2025-05-08
Payer: COMMERCIAL

## 2025-05-08 DIAGNOSIS — Z47.89 ENCOUNTER FOR OTHER ORTHOPEDIC AFTERCARE: ICD-10-CM

## 2025-05-08 DIAGNOSIS — M25.552 PAIN IN LEFT HIP: ICD-10-CM

## 2025-05-08 DIAGNOSIS — S73.192D OTHER SPRAIN OF LEFT HIP, SUBSEQUENT ENCOUNTER: ICD-10-CM

## 2025-05-08 PROCEDURE — 97110 THERAPEUTIC EXERCISES: CPT | Mod: GP | Performed by: PHYSICAL THERAPIST

## 2025-05-08 PROCEDURE — 97140 MANUAL THERAPY 1/> REGIONS: CPT | Mod: GP | Performed by: PHYSICAL THERAPIST

## 2025-05-08 ASSESSMENT — PAIN SCALES - GENERAL: PAINLEVEL_OUTOF10: 0 - NO PAIN

## 2025-05-08 NOTE — PROGRESS NOTES
"Physical Therapy  Physical Therapy Treatment Note     Patient Name: Jessica Perez  MRN: [id number]  Today's Date: 5/8/2025    Insurance:  Visit number: 26 of 50 (20 total)  Authorization info: no auth and reset 1/1/2025           Insurance Type: [place] (no vaso)     Current Problem  1. Hip stiffness, left          2. Other sprain of left hip, subsequent encounter  Follow Up In Physical Therapy       3. Encounter for other orthopedic aftercare  Follow Up In Physical Therapy       4. Pain in left hip  Follow Up In Physical Therapy       5. Weakness of left hip          6. Orthopedic aftercare                   Precautions       General  Reason for Referral: Left Hip Arthroscopy; Labral Repair; Rim Trim  Referred By: Dr. Errol CHAMBERS  DOS: 12/19/2024  POW: 16     Pain  Pain Assessment: 0-10  0-10 (Numeric) Pain Score: 0     Subjective:   Pt arrived to therapy reporting she is doing well with 0/10 pain. Pt did report minor 2/10 hip soreness.    Performing HEP?: Yes        Objective:   HIP    Hip PROM   L hip IR (at 90): 35  L hip flexion: 120     Treatment Performed:     Therapeutic Exercise:                      50    min  Upright bike x 7'   Resisted side stepping blue TB loop 3x10  Half kneel hip flexor stretch 2x1'  Prone Quad stretch 2x1'  90/90 stretch bilat x10  Heel elevated goblet squats 3x12  [place]ogue Leg Press #55 4x8  [place] RDL (medial pull) 3x8  Deadbugs with #5 Dumbbells 3x12    NOT TODAY  KB 20\" Goblet Squats 3x12  SL Leg Press 25# 3x12   S/L Hip Abduction 4-Way (fwd, bwd, pulsing, circles)  TG SL press seat 1 no cords 3x12  Prone IR/ER x20  Prone resisted hip IR/ER blue band 2x12 each  Lateral heel tap 6 inch box 3 x 12  SL 24\" Box squat 4x8  SL Heel Taps on 4\" step (fwd/bwd)  TG single leg press 3x12  Hex bar DL 65# 4x8  Prone Quad stretch 3x1'  Hamstring Strap Stretch 3x1'  Supine Figure 4 Piriformis Stretch 3x1'  Half Kneeling Open Books Against Wall 3x12  Quadruped Hip Cars 3x12  Therapeutic Activity:  "                          0  min  Not today:  Pogo Plyometric jumps with purple band (DL, Alt, SL) 2x20   Squat Jumps 2x8  Fwd Decels with SL Landing 2x6   Overground step holds 2x6 (fwd, lateral)  Treadmill walk/jog program 1' min walk at 2.6 mph /1' jog 4.6 mph x5 rounds      Manual Therapy:                                15  min  STM adductors, glutes, hip flexors, posterior hip rotators  LAD NT  Circumduction   Lateral hip distraction grade 3  Lateral hip distraction in 90 deg flex grade 3   MWM belt IR   NT  Prone PA with IR/ER   Long rolling     Neuromuscular Re-education:                        0  min        Therapeutic Modalities:                         min        Other:                                                  min              Assessment:   The focus of the session was strength, ROM, stretching, joint mobilization, soft tissue massage, and motor control. The pt demonstrated good tolerance to the noted exercises today by no pain with exercises to challenge dynamic hip and knee stability and strength  . The pt is demonstrating good progression in skilled rehab at this time. The pt is still limited in overall strength, ROM, flexibility, motor control, balance, and pain at this time. The pt continues to be a good candidate for skilled PT, in order to further improve strength, ROM, flexibility, motor control, balance, and pain.   Plan:  Progress per protocol. Incorporate more strengthening and plyometric exercises.     Goals:  Active         PT Problem         PT Goal 1         Start:  12/20/24    Expected End:  06/20/25        Patient will report <4/10 pain in R hip by 4 weeks   PROM R hip flexion 90 deg by 3-4 weeks, 110 deg by 6 weeks, abduction 20 deg by 3 weeks, 40 deg by 6 weeks, ER 20 deg by 3 weeks, and IR 30 deg by 4-6 weeks to demonstrate improved joint mechanics to perform ADLs   LTG 12-16 weeks R hip strength: flexion, abduction, ER/IR, extension, glute max  >4+ to 5/5 MMT or 80% to  demonstrate pelvic stability during ADLs and higher level functional tasks   Patient able to perform SL squat without valgus or anterior hip impingement to demonstrate LE biomechanics by 6- 8 weeks   LEFS > 30/80 to demonstrate improved ability to perform ADLs by 6 weeks

## 2025-05-09 ENCOUNTER — APPOINTMENT (OUTPATIENT)
Dept: NUTRITION | Facility: CLINIC | Age: 25
End: 2025-05-09
Payer: COMMERCIAL

## 2025-05-09 DIAGNOSIS — E66.811 OBESITY (BMI 30.0-34.9): ICD-10-CM

## 2025-05-09 DIAGNOSIS — Z71.3 DIETARY COUNSELING AND SURVEILLANCE: Primary | ICD-10-CM

## 2025-05-09 PROCEDURE — 97803 MED NUTRITION INDIV SUBSEQ: CPT | Performed by: DIETITIAN, REGISTERED

## 2025-05-09 NOTE — PATIENT INSTRUCTIONS
Discussed that forming a new habit around meal preparation will be the foundation of changing eating habits. Talked about prepping food 2-3 days per week for dinner that she can also use for 1-2 lunches that week. Encouraged her to talk with her boyfriend about division of work for the tasks around meals such as planning what to eat, shopping for it (try shopping on an javier and  at the store?) and cooking.

## 2025-05-13 ENCOUNTER — APPOINTMENT (OUTPATIENT)
Dept: PHYSICAL THERAPY | Facility: HOSPITAL | Age: 25
End: 2025-05-13
Payer: COMMERCIAL

## 2025-05-15 ENCOUNTER — TREATMENT (OUTPATIENT)
Dept: PHYSICAL THERAPY | Facility: HOSPITAL | Age: 25
End: 2025-05-15
Payer: COMMERCIAL

## 2025-05-15 DIAGNOSIS — Z47.89 ENCOUNTER FOR OTHER ORTHOPEDIC AFTERCARE: ICD-10-CM

## 2025-05-15 DIAGNOSIS — S73.192D OTHER SPRAIN OF LEFT HIP, SUBSEQUENT ENCOUNTER: ICD-10-CM

## 2025-05-15 DIAGNOSIS — M25.552 PAIN IN LEFT HIP: ICD-10-CM

## 2025-05-15 PROCEDURE — 97140 MANUAL THERAPY 1/> REGIONS: CPT | Mod: GP | Performed by: PHYSICAL THERAPIST

## 2025-05-15 PROCEDURE — 97110 THERAPEUTIC EXERCISES: CPT | Mod: GP | Performed by: PHYSICAL THERAPIST

## 2025-05-15 PROCEDURE — 97530 THERAPEUTIC ACTIVITIES: CPT | Mod: GP | Performed by: PHYSICAL THERAPIST

## 2025-05-15 NOTE — PROGRESS NOTES
"Physical Therapy  Physical Therapy Treatment Note     Patient Name: Jessica Perez  MRN: [id number]  Today's Date: 5/15/2025    Insurance:  Visit number: 27 of 50 (20 total)  Authorization info: no auth and reset 1/1/2025           Insurance Type: [place] (no vaso)     Current Problem  1. Hip stiffness, left          2. Other sprain of left hip, subsequent encounter  Follow Up In Physical Therapy       3. Encounter for other orthopedic aftercare  Follow Up In Physical Therapy       4. Pain in left hip  Follow Up In Physical Therapy       5. Weakness of left hip          6. Orthopedic aftercare                   Precautions       General  Reason for Referral: Left Hip Arthroscopy; Labral Repair; Rim Trim  Referred By: Dr. Errol CHAMBERS  DOS: 12/19/2024  POW: 20 weeks     Pain  Pain Assessment: 0-10  0-10 (Numeric) Pain Score: 0     Subjective:   Pt arrived to therapy reporting she is doing well with 0/10 pain. Pt did report minor 2/10 hip soreness.    Performing HEP?: Yes        Objective:   HIP    Hip PROM   L hip IR (at 90): 35  L hip flexion: 120     Treatment Performed:     Therapeutic Exercise:                     37    min  Upright bike x 7'   Resisted side stepping blue TB loop 3x10  Half kneel hip flexor stretch 2x1'  Prone Quad stretch 2x1'  90/90 stretch bilat x10      NOT TODAY  KB 20\" Goblet Squats 3x12  SL Leg Press 25# 3x12   S/L Hip Abduction 4-Way (fwd, bwd, pulsing, circles)  TG SL press seat 1 no cords 3x12  Prone IR/ER x20  Prone resisted hip IR/ER blue band 2x12 each  Lateral heel tap 6 inch box 3 x 12  SL 24\" Box squat 4x8  SL Heel Taps on 4\" step (fwd/bwd)  TG single leg press 3x12  Hex bar DL 65# 4x8  Prone Quad stretch 3x1'  Hamstring Strap Stretch 3x1'  Supine Figure 4 Piriformis Stretch 3x1'  Half Kneeling Open Books Against Wall 3x12  Quadruped Hip Cars 3x12  Therapeutic Activity:                           8 min  Treadmill walk/jog program 1' min walk at 3.0mph /1' jog 4.5 mph x8 rounds    "   Manual Therapy:                                15  min  STM adductors, glutes, hip flexors, posterior hip rotators  LAD NT  Circumduction   Lateral hip distraction grade 3  Lateral hip distraction in 90 deg flex grade 3   MWM belt IR   NT  Prone PA with IR/ER   Long rolling     Neuromuscular Re-education:                        0  min        Therapeutic Modalities:                         min        Other:                                                  min              Assessment:   The focus of the session was strength, ROM, stretching, joint mobilization, soft tissue massage, and motor control. The pt demonstrated good tolerance to the noted exercises today by no pain with exercises to challenge dynamic hip and knee stability and strength  . The pt is demonstrating good progression in skilled rehab at this time. The pt is still limited in overall strength, ROM, flexibility, motor control, balance, and pain at this time. The pt continues to be a good candidate for skilled PT, in order to further improve strength, ROM, flexibility, motor control, balance, and pain.     Pt did well with walk/jog today by denying increase pain    Plan:  Progress per protocol. Incorporate more strengthening and plyometric exercises.     Goals:  Active         PT Problem         PT Goal 1         Start:  12/20/24    Expected End:  06/20/25        Patient will report <4/10 pain in R hip by 4 weeks   PROM R hip flexion 90 deg by 3-4 weeks, 110 deg by 6 weeks, abduction 20 deg by 3 weeks, 40 deg by 6 weeks, ER 20 deg by 3 weeks, and IR 30 deg by 4-6 weeks to demonstrate improved joint mechanics to perform ADLs   LTG 12-16 weeks R hip strength: flexion, abduction, ER/IR, extension, glute max  >4+ to 5/5 MMT or 80% to demonstrate pelvic stability during ADLs and higher level functional tasks   Patient able to perform SL squat without valgus or anterior hip impingement to demonstrate LE biomechanics by 6- 8 weeks   LEFS > 30/80 to  demonstrate improved ability to perform ADLs by 6 weeks

## 2025-05-20 ENCOUNTER — APPOINTMENT (OUTPATIENT)
Dept: PHYSICAL THERAPY | Facility: HOSPITAL | Age: 25
End: 2025-05-20
Payer: COMMERCIAL

## 2025-05-22 ENCOUNTER — TREATMENT (OUTPATIENT)
Dept: PHYSICAL THERAPY | Facility: HOSPITAL | Age: 25
End: 2025-05-22
Payer: COMMERCIAL

## 2025-05-22 DIAGNOSIS — Z47.89 ENCOUNTER FOR OTHER ORTHOPEDIC AFTERCARE: ICD-10-CM

## 2025-05-22 DIAGNOSIS — S73.192D OTHER SPRAIN OF LEFT HIP, SUBSEQUENT ENCOUNTER: ICD-10-CM

## 2025-05-22 DIAGNOSIS — M25.552 PAIN IN LEFT HIP: ICD-10-CM

## 2025-05-22 PROCEDURE — 97140 MANUAL THERAPY 1/> REGIONS: CPT | Mod: GP | Performed by: PHYSICAL THERAPIST

## 2025-05-22 PROCEDURE — 97110 THERAPEUTIC EXERCISES: CPT | Mod: GP | Performed by: PHYSICAL THERAPIST

## 2025-05-22 ASSESSMENT — PAIN SCALES - GENERAL: PAINLEVEL_OUTOF10: 0 - NO PAIN

## 2025-05-22 ASSESSMENT — PAIN - FUNCTIONAL ASSESSMENT: PAIN_FUNCTIONAL_ASSESSMENT: 0-10

## 2025-05-22 NOTE — PROGRESS NOTES
"Physical Therapy  Physical Therapy Treatment Note     Patient Name: Jessica Perez  MRN: [id number]  Today's Date: 5/22/2025  Time in:1617  Time Out:1703    Insurance:  Visit number: 28 of 50 (20 total)  Authorization info: no auth and reset 1/1/2025           Insurance Type: [place] (no vaso)     Current Problem  1. Hip stiffness, left          2. Other sprain of left hip, subsequent encounter  Follow Up In Physical Therapy       3. Encounter for other orthopedic aftercare  Follow Up In Physical Therapy       4. Pain in left hip  Follow Up In Physical Therapy       5. Weakness of left hip          6. Orthopedic aftercare                   Precautions       General  Reason for Referral: Left Hip Arthroscopy; Labral Repair; Rim Trim  Referred By: Dr. Errol CHAMBERS  DOS: 12/19/2024  PO:5 months     Pain  Pain Assessment: 0-10  0-10 (Numeric) Pain Score: 0     Subjective:   Pt arrived to therapy stating her hip is doing well but would like to have a shorter session today because secondary to injury of her friend. Pt would like to focus on mobility and manual today    Performing HEP?: Yes        Objective:   HIP    Hip PROM   L hip IR (at 90): 35  L hip flexion: 120     Treatment Performed:     Therapeutic Exercise:                     16    min  Upright bike x 7'   Resisted side stepping blue TB loop 3x10  Half kneel hip flexor stretch 2x1'  Prone Quad stretch 2x1'  90/90 stretch bilat x10      NOT TODAY  KB 20\" Goblet Squats 3x12  SL Leg Press 25# 3x12   S/L Hip Abduction 4-Way (fwd, bwd, pulsing, circles)  TG SL press seat 1 no cords 3x12  Prone IR/ER x20  Prone resisted hip IR/ER blue band 2x12 each  Lateral heel tap 6 inch box 3 x 12  SL 24\" Box squat 4x8  SL Heel Taps on 4\" step (fwd/bwd)  TG single leg press 3x12  Hex bar DL 65# 4x8  Prone Quad stretch 3x1'  Hamstring Strap Stretch 3x1'  Supine Figure 4 Piriformis Stretch 3x1'  Half Kneeling Open Books Against Wall 3x12  Quadruped Hip Cars 3x12  Therapeutic " Activity:                            NT  Treadmill walk/jog program 1' min walk at 3.0mph /1' jog 4.5 mph x8 rounds      Manual Therapy:                                30  min  STM adductors, glutes, hip flexors, posterior hip rotators  LAD NT  Circumduction   Lateral hip distraction grade 3  Lateral hip distraction in 90 deg flex grade 3   MWM belt IR   Prone PA with IR/ER        Neuromuscular Re-education:                        0  min        Therapeutic Modalities:                         min        Other:                                                  min              Assessment:   The focus of the session was ROM, stretching, joint mobilization, and soft tissue massage. The pt demonstrated good tolerance to the noted exercises today by demonstrating improved ROM post. The pt is demonstrated good progress in skilled rehab at this time. The pt is still limited in overall strength, ROM, flexibility, and motor control at this time. The pt continues to be a good candidate for skilled PT, in order to further improve strength, ROM, flexibility, and motor control.           Plan:  Progress per protocol. Incorporate more strengthening and plyometric exercises.     Goals:  Active         PT Problem         PT Goal 1         Start:  12/20/24    Expected End:  06/20/25        Patient will report <4/10 pain in R hip by 4 weeks   PROM R hip flexion 90 deg by 3-4 weeks, 110 deg by 6 weeks, abduction 20 deg by 3 weeks, 40 deg by 6 weeks, ER 20 deg by 3 weeks, and IR 30 deg by 4-6 weeks to demonstrate improved joint mechanics to perform ADLs   LTG 12-16 weeks R hip strength: flexion, abduction, ER/IR, extension, glute max  >4+ to 5/5 MMT or 80% to demonstrate pelvic stability during ADLs and higher level functional tasks   Patient able to perform SL squat without valgus or anterior hip impingement to demonstrate LE biomechanics by 6- 8 weeks   LEFS > 30/80 to demonstrate improved ability to perform ADLs by 6 weeks

## 2025-05-27 ENCOUNTER — APPOINTMENT (OUTPATIENT)
Dept: PHYSICAL THERAPY | Facility: HOSPITAL | Age: 25
End: 2025-05-27
Payer: COMMERCIAL

## 2025-05-29 ENCOUNTER — TREATMENT (OUTPATIENT)
Dept: PHYSICAL THERAPY | Facility: HOSPITAL | Age: 25
End: 2025-05-29
Payer: COMMERCIAL

## 2025-05-29 DIAGNOSIS — M25.652 HIP STIFFNESS, LEFT: Primary | ICD-10-CM

## 2025-05-29 DIAGNOSIS — Z47.89 ORTHOPEDIC AFTERCARE: ICD-10-CM

## 2025-05-29 DIAGNOSIS — M25.552 PAIN IN LEFT HIP: ICD-10-CM

## 2025-05-29 DIAGNOSIS — S73.192D OTHER SPRAIN OF LEFT HIP, SUBSEQUENT ENCOUNTER: ICD-10-CM

## 2025-05-29 DIAGNOSIS — Z47.89 ENCOUNTER FOR OTHER ORTHOPEDIC AFTERCARE: ICD-10-CM

## 2025-05-29 DIAGNOSIS — R29.898 WEAKNESS OF LEFT HIP: ICD-10-CM

## 2025-05-29 PROCEDURE — 97110 THERAPEUTIC EXERCISES: CPT | Mod: GP | Performed by: PHYSICAL THERAPIST

## 2025-05-29 PROCEDURE — 97140 MANUAL THERAPY 1/> REGIONS: CPT | Mod: GP | Performed by: PHYSICAL THERAPIST

## 2025-05-29 ASSESSMENT — PAIN SCALES - GENERAL: PAINLEVEL_OUTOF10: 0 - NO PAIN

## 2025-05-29 ASSESSMENT — PAIN - FUNCTIONAL ASSESSMENT: PAIN_FUNCTIONAL_ASSESSMENT: 0-10

## 2025-05-29 NOTE — PROGRESS NOTES
"  Physical Therapy  Physical Therapy Treatment Note    Patient Name: Jessica Perez  MRN: [id number]  Today's Date: 5/29/2025  Time Calculation  Start Time: 1620  Stop Time: 1730  Time Calculation (min): 70 min    Insurance:  Visit number: 29 of 50 (38 total)  Authorization info: no auth and reset 1/1/2025           Insurance Type: [place] (no vaso)    General:  Reason for Referral: Left Hip Arthroscopy; Labral Repair; Rim Trim  Referred By: Dr. Errol CHAMBERS  DOS: 12/19/2024  PO:5 months       Current Problem  1. Hip stiffness, left        2. Other sprain of left hip, subsequent encounter  Follow Up In Physical Therapy      3. Encounter for other orthopedic aftercare  Follow Up In Physical Therapy      4. Pain in left hip  Follow Up In Physical Therapy      5. Weakness of left hip        6. Orthopedic aftercare            Subjective:     Patient reports she is doing well overall. Pt states she has not been able to workout much since last visit but would like to work on strength today    Pain  Pain Assessment: 0-10  0-10 (Numeric) Pain Score: 0 - No pain    Performing HEP?: Partially      Objective:         Treatment Performed:      Therapeutic Exercise:    45 min  Upright bike x 6'   Resisted side stepping blue TB loop 3x10  Half kneel hip flexor stretch 2x1'  Prone Quad stretch 2x1'  90/90 stretch bilat x10   heel elevated goblet squat 4x8  SL bridge from 12\"box 3x10 each  Fwd step up on 12\" box 4x8 blue KB  Retro lunge with 2-15# KB  12\" box jumps x5    Manual Therapy:    15 min  STM adductors, glutes, hip flexors, posterior hip rotators  Lateral hip distraction grade 3  Lateral hip distraction in 90 deg flex grade 3   MWM belt IR   Prone PA with IR/ER     Neuromuscular Re-education:   min      Other:      min      Assessment:   The focus of the session was strength, ROM, stretching, joint mobilization, soft tissue massage, and functional training. The pt demonstrated good tolerance to the noted exercises today by " reporting she was challenged but denied pain throughout. Pt continues to demonstrated improved ROM. The pt is demonstrated good progress in skilled rehab at this time. The pt is still limited in overall strength, ROM, flexibility, motor control, balance, gait mechanics, and pain at this time. The pt continues to be a good candidate for skilled PT, in order to further improve strength, ROM, flexibility, motor control, balance, gait mechanics, and pain.       Plan:  Continue to progress strength and plyometrics. Manual therapy for ROM      Edward Cornell, PT

## 2025-06-03 ENCOUNTER — APPOINTMENT (OUTPATIENT)
Dept: PHYSICAL THERAPY | Facility: HOSPITAL | Age: 25
End: 2025-06-03
Payer: COMMERCIAL

## 2025-06-10 ENCOUNTER — TREATMENT (OUTPATIENT)
Dept: PHYSICAL THERAPY | Facility: HOSPITAL | Age: 25
End: 2025-06-10
Payer: COMMERCIAL

## 2025-06-10 DIAGNOSIS — M25.552 LEFT HIP PAIN: ICD-10-CM

## 2025-06-10 DIAGNOSIS — Z47.89 ORTHOPEDIC AFTERCARE: ICD-10-CM

## 2025-06-10 DIAGNOSIS — M25.652 HIP STIFFNESS, LEFT: ICD-10-CM

## 2025-06-10 DIAGNOSIS — R29.898 WEAKNESS OF LEFT HIP: ICD-10-CM

## 2025-06-10 DIAGNOSIS — S73.192D TEAR OF LEFT ACETABULAR LABRUM, SUBSEQUENT ENCOUNTER: ICD-10-CM

## 2025-06-10 PROCEDURE — 97110 THERAPEUTIC EXERCISES: CPT | Mod: GP | Performed by: PHYSICAL THERAPIST

## 2025-06-10 PROCEDURE — 97140 MANUAL THERAPY 1/> REGIONS: CPT | Mod: GP | Performed by: PHYSICAL THERAPIST

## 2025-06-10 ASSESSMENT — PAIN - FUNCTIONAL ASSESSMENT: PAIN_FUNCTIONAL_ASSESSMENT: 0-10

## 2025-06-10 ASSESSMENT — PAIN SCALES - GENERAL: PAINLEVEL_OUTOF10: 0 - NO PAIN

## 2025-06-10 NOTE — PROGRESS NOTES
Physical Therapy  Physical Therapy Treatment Note    Patient Name: Jessica Perez  MRN: [id number]  Today's Date: 6/10/2025  Time Calculation  Start Time: 1600  Stop Time: 1705  Time Calculation (min): 65 min    Insurance:  Visit number: 30 of 50 (39 total)  Authorization info: no auth and reset 1/1/2025           Insurance Type: [place] (no vaso)    General:  Reason for Referral: Left Hip Arthroscopy; Labral Repair; Rim Trim  Referred By: Dr. Errol CHAMBERS  DOS: 12/19/2024  PO:5.5 months       Current Problem  1. Tear of left acetabular labrum, subsequent encounter  Follow Up In Physical Therapy      2. Orthopedic aftercare  Follow Up In Physical Therapy      3. Left hip pain  Follow Up In Physical Therapy      4. Hip stiffness, left  Follow Up In Physical Therapy      5. Weakness of left hip  Follow Up In Physical Therapy          Subjective:   Pt arrived to therapy today reporting overall she is doing well. Pt participated in a yoga/piliates class. Pt reports groin tightness with butterfly stretch    Pain  Pain Assessment: 0-10  0-10 (Numeric) Pain Score: 0 - No pain    Performing HEP?: Partially      Objective:     Treatment Performed:      Therapeutic Exercise:    45 min  Upright bike x 6'   Resisted side stepping blue TB loop 3x10  Half kneel hip flexor stretch 2x1'  Prone Quad stretch 2x1'  90/90 stretch bilat x10  Dotty lunge stretch  Bird dog with blue band  Dead bud with PB  High plank with resisted hip flexion OTB      Manual Therapy:    15 min  STM adductors, glutes, hip flexors, posterior hip rotators  Lateral hip distraction grade 3  Lateral hip distraction in 90 deg flex grade 3   MWM belt IR   Prone PA with IR/ER     Neuromuscular Re-education:   min      Other:      min      Assessment:   The focus of the session was strength, ROM, stretching, joint mobilization, soft tissue massage, and functional training. The pt demonstrated good tolerance to the noted exercises today by reporting she was  challenged but denied pain throughout. Pt continues to demonstrated improved ROM. The pt is demonstrated good progress in skilled rehab at this time. The pt is still limited in overall strength, ROM, flexibility, motor control, balance, gait mechanics, and pain at this time. The pt continues to be a good candidate for skilled PT, in order to further improve strength, ROM, flexibility, motor control, balance, gait mechanics, and pain.       Plan:  Continue to progress strength and plyometrics. Manual therapy for ROM      Edward Cornell, PT

## 2025-06-13 ENCOUNTER — TREATMENT (OUTPATIENT)
Dept: PHYSICAL THERAPY | Facility: HOSPITAL | Age: 25
End: 2025-06-13
Payer: COMMERCIAL

## 2025-06-13 DIAGNOSIS — Z47.89 ENCOUNTER FOR OTHER ORTHOPEDIC AFTERCARE: ICD-10-CM

## 2025-06-13 DIAGNOSIS — S73.191A ACETABULAR LABRUM TEAR, RIGHT, INITIAL ENCOUNTER: Primary | ICD-10-CM

## 2025-06-13 DIAGNOSIS — M25.552 PAIN IN LEFT HIP: ICD-10-CM

## 2025-06-13 DIAGNOSIS — R29.898 WEAKNESS OF RIGHT HIP: ICD-10-CM

## 2025-06-13 DIAGNOSIS — Z30.44 ENCOUNTER FOR SURVEILLANCE OF VAGINAL RING HORMONAL CONTRACEPTIVE DEVICE: Primary | ICD-10-CM

## 2025-06-13 DIAGNOSIS — M25.551 RIGHT HIP PAIN: ICD-10-CM

## 2025-06-13 DIAGNOSIS — S73.192D OTHER SPRAIN OF LEFT HIP, SUBSEQUENT ENCOUNTER: ICD-10-CM

## 2025-06-13 DIAGNOSIS — M25.851 FEMOROACETABULAR IMPINGEMENT OF RIGHT HIP: ICD-10-CM

## 2025-06-13 DIAGNOSIS — M25.651 HIP STIFFNESS, RIGHT: ICD-10-CM

## 2025-06-13 PROCEDURE — 97140 MANUAL THERAPY 1/> REGIONS: CPT | Mod: GP | Performed by: PHYSICAL THERAPIST

## 2025-06-13 PROCEDURE — 97110 THERAPEUTIC EXERCISES: CPT | Mod: GP | Performed by: PHYSICAL THERAPIST

## 2025-06-13 RX ORDER — ETONOGESTREL AND ETHINYL ESTRADIOL VAGINAL RING .015; .12 MG/D; MG/D
RING VAGINAL
Qty: 1 EACH | Refills: 5 | Status: SHIPPED | OUTPATIENT
Start: 2025-06-13 | End: 2026-06-13

## 2025-06-13 ASSESSMENT — PAIN SCALES - GENERAL: PAINLEVEL_OUTOF10: 0 - NO PAIN

## 2025-06-13 ASSESSMENT — PAIN - FUNCTIONAL ASSESSMENT: PAIN_FUNCTIONAL_ASSESSMENT: 0-10

## 2025-06-13 NOTE — PROGRESS NOTES
Physical Therapy  Physical Therapy Treatment Note    Patient Name: Jessica Perez  MRN: [id number]  Today's Date: 6/13/2025  Time Calculation  Start Time: 0700  Stop Time: 0800  Time Calculation (min): 60 min    Insurance:  Visit number: 31 of 50 (40 total)  Authorization info: no auth and reset 1/1/2025           Insurance Type: [place] (no vaso)    General:  Reason for Referral: Left Hip Arthroscopy; Labral Repair; Rim Trim  Referred By: Dr. Errol CHAMBERS  DOS: 12/19/2024  PO:5.5 months       Current Problem  1. Acetabular labrum tear, right, initial encounter        2. Other sprain of left hip, subsequent encounter  Follow Up In Physical Therapy      3. Encounter for other orthopedic aftercare  Follow Up In Physical Therapy      4. Pain in left hip  Follow Up In Physical Therapy      5. Right hip pain        6. Hip stiffness, right        7. Weakness of right hip        8. Femoroacetabular impingement of right hip            Subjective:   Pt arrived to therapy reporting she is doing really well today. Pt states she was able to run 3'jog/2'walk with no issues. Pt sees ortho next week    Pain  Pain Assessment: 0-10  0-10 (Numeric) Pain Score: 0 - No pain    Performing HEP?: Partially      Objective:   HIP       Hip AROM  R hip flexion: (125°): 120  L hip flexion: (125°): 120  R hip abduction: (45°): 45  L hip abduction: (45°): 45  R hip extension: (10°): 10  L hip extension: (10°): 10  Hip PROM  L hip flexion: (125°): 125  L hip ER: (45°): 45  L hip IR: (45°): 40    Treatment Performed:      Therapeutic Exercise:    30 min  Upright bike x 6'   Resisted side stepping blue TB loop 3x10  Half kneel hip flexor stretch 2x1'  Prone Quad stretch 2x1'  90/90 stretch bilat x10  Dotty lunge stretch  Reassessment of ROM       Manual Therapy:    25 min  STM adductors, glutes, hip flexors, posterior hip rotators  Lateral hip distraction grade 3  Lateral hip distraction in 90 deg flex grade 3   MWM belt IR   Prone PA with IR/ER      Neuromuscular Re-education:   min      Other:      min      Assessment:   The focus of the session was strength, ROM, stretching, joint mobilization, soft tissue massage, and functional training. The pt demonstrated good tolerance to the noted exercises today by reporting she was challenged but denied pain throughout. Pt continues to demonstrated improved ROM. The pt is demonstrated good progress in skilled rehab at this time. The pt is still limited in overall strength, ROM, flexibility, motor control, balance, gait mechanics, and pain at this time. The pt continues to be a good candidate for skilled PT, in order to further improve strength, ROM, flexibility, motor control, balance, gait mechanics, and pain.       Plan:  Continue to progress strength and plyometrics. Manual therapy for ROM      Edward Cornell, PT

## 2025-06-16 ENCOUNTER — APPOINTMENT (OUTPATIENT)
Dept: PHYSICAL THERAPY | Facility: HOSPITAL | Age: 25
End: 2025-06-16
Payer: COMMERCIAL

## 2025-06-17 ENCOUNTER — APPOINTMENT (OUTPATIENT)
Dept: PHYSICAL THERAPY | Facility: HOSPITAL | Age: 25
End: 2025-06-17
Payer: COMMERCIAL

## 2025-06-18 ENCOUNTER — OFFICE VISIT (OUTPATIENT)
Dept: ORTHOPEDIC SURGERY | Facility: HOSPITAL | Age: 25
End: 2025-06-18
Payer: COMMERCIAL

## 2025-06-18 DIAGNOSIS — S73.192D TEAR OF LEFT ACETABULAR LABRUM, SUBSEQUENT ENCOUNTER: Primary | ICD-10-CM

## 2025-06-18 PROCEDURE — 99213 OFFICE O/P EST LOW 20 MIN: CPT | Performed by: PHYSICIAN ASSISTANT

## 2025-06-18 PROCEDURE — 99212 OFFICE O/P EST SF 10 MIN: CPT | Performed by: PHYSICIAN ASSISTANT

## 2025-06-18 ASSESSMENT — PAIN - FUNCTIONAL ASSESSMENT: PAIN_FUNCTIONAL_ASSESSMENT: 0-10

## 2025-06-18 ASSESSMENT — PAIN SCALES - GENERAL: PAINLEVEL_OUTOF10: 0 - NO PAIN

## 2025-06-18 NOTE — PROGRESS NOTES
Patient returns for follow-up of left hip scope DOS 12/19/24      Patient is here today 6 months out from her revision left hip arthroscopy with labral reconstruction with iliotibial band allograft.  Date of surgery 12/19/2024.  She is doing great.  She notes significant improvement in her preoperative pain.  She has great range of motion.  She is continuing to work on strengthening.  Overall she is very happy with her outcome.  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  She can follow-up with us as needed.      Yisel Lo PA-C

## 2025-06-24 ENCOUNTER — TREATMENT (OUTPATIENT)
Dept: PHYSICAL THERAPY | Facility: HOSPITAL | Age: 25
End: 2025-06-24
Payer: COMMERCIAL

## 2025-06-24 DIAGNOSIS — Z47.89 ENCOUNTER FOR OTHER ORTHOPEDIC AFTERCARE: ICD-10-CM

## 2025-06-24 DIAGNOSIS — M25.552 PAIN IN LEFT HIP: ICD-10-CM

## 2025-06-24 DIAGNOSIS — S73.192D OTHER SPRAIN OF LEFT HIP, SUBSEQUENT ENCOUNTER: ICD-10-CM

## 2025-06-24 PROCEDURE — 97110 THERAPEUTIC EXERCISES: CPT | Mod: GP | Performed by: PHYSICAL THERAPIST

## 2025-06-24 PROCEDURE — 97140 MANUAL THERAPY 1/> REGIONS: CPT | Mod: GP | Performed by: PHYSICAL THERAPIST

## 2025-06-24 ASSESSMENT — PAIN SCALES - GENERAL: PAINLEVEL_OUTOF10: 0 - NO PAIN

## 2025-06-24 ASSESSMENT — PAIN - FUNCTIONAL ASSESSMENT: PAIN_FUNCTIONAL_ASSESSMENT: 0-10

## 2025-06-24 NOTE — PROGRESS NOTES
Physical Therapy  Physical Therapy Treatment Note    Patient Name: Jessica Perez  MRN: [id number]  Today's Date: 6/24/2025  Time Calculation  Start Time: 1600  Stop Time: 1650  Time Calculation (min): 50 min    Insurance:  Visit number: 32 of 50 (40 total)  Authorization info: no auth and reset 1/1/2025           Insurance Type: [place] (no vaso)    General:  Reason for Referral: Left Hip Arthroscopy; Labral Repair; Rim Trim  Referred By: Dr. Errol CHAMBERS  DOS: 12/19/2024  PO:5.5 months       Current Problem  1. Other sprain of left hip, subsequent encounter  Follow Up In Physical Therapy      2. Encounter for other orthopedic aftercare  Follow Up In Physical Therapy      3. Pain in left hip  Follow Up In Physical Therapy          Subjective:   Pt arrived to therapy reporting she is     Pain  Pain Assessment: 0-10  0-10 (Numeric) Pain Score: 0 - No pain    Performing HEP?: Partially      Objective:   HIP       Hip AROM  R hip flexion: (125°): 120  L hip flexion: (125°): 120  R hip abduction: (45°): 45  L hip abduction: (45°): 45  R hip extension: (10°): 10  L hip extension: (10°): 10  Hip PROM  L hip flexion: (125°): 125  L hip ER: (45°): 45  L hip IR: (45°): 40    Treatment Performed:      Therapeutic Exercise:    30min  Upright bike x 6'   Resisted side stepping blue TB loop 3x10  Half kneel hip flexor stretch 2x1'  Prone Quad stretch 2x1'  90/90 stretch bilat x10  Half kneeling quad stretch  Standing resisted hip flexion  Hip flexion over KB  Adductor slides on slide board        Manual Therapy:    10 min  STM adductors, glutes, hip flexors, posterior hip rotators      Neuromuscular Re-education:   min      Other:      min      Assessment:   The focus of the session was strength, ROM, stretching, and soft tissue massage. The pt demonstrated good tolerance to the noted exercises today by denying pain throughout and mantaing good hip mobility. At this point patient and I agree that she is ready for discharge. Pt  might check in during the middle of July as she ramps up activity outside the gym. POC will stay open for 1 month      Plan:  Keep POC open for 1 month and discharge if no need      Edward Cornell, PT

## 2025-07-21 ENCOUNTER — APPOINTMENT (OUTPATIENT)
Dept: DERMATOLOGY | Facility: CLINIC | Age: 25
End: 2025-07-21
Payer: COMMERCIAL

## 2025-07-21 DIAGNOSIS — L20.89 OTHER ATOPIC DERMATITIS: Primary | ICD-10-CM

## 2025-07-21 PROCEDURE — 99204 OFFICE O/P NEW MOD 45 MIN: CPT | Performed by: STUDENT IN AN ORGANIZED HEALTH CARE EDUCATION/TRAINING PROGRAM

## 2025-07-21 RX ORDER — CLOBETASOL PROPIONATE 0.5 MG/G
CREAM TOPICAL 2 TIMES DAILY
Qty: 60 G | Refills: 11 | Status: SHIPPED | OUTPATIENT
Start: 2025-07-21

## 2025-07-21 NOTE — Clinical Note
The chronic and intermittently flaring nature of this skin condition was discussed. Advised that this occurs due to a genetic defect in the skin barrier resulting in increased loss of water from skin resulting in dry, itchy, inflammed skin. Atopic dermatitis treatment goal is to restore the skin barrier.    We discussed a gentle skin care regimen including washing with a mild soap without fragrance such as dove for sensitive skin, cetaphil or cerave. Pat dry after washing and then apply a thick moisturizer such as vaseline, Aquaphor, Cerave cream or Cetaphil cream.  Reapplication multiple times per day may be necessary.     Failed topical triamcinolone.     When the skin has flared patient to apply clobetasol 0.05% cream to affected areas on body 2x daily x 2 weeks, then one week off, and repeat as needed. Side effects of topical steroids were reviewed including risk of skin atrophy

## 2025-07-21 NOTE — Clinical Note
Erythematous scaly papules and plaques with overyling excoriation located symmetrically in the antecubital fossae, right shoulder, bilateral thighs

## 2025-07-21 NOTE — PROGRESS NOTES
Subjective     Jessica Perez is a 24 y.o. female who presents for the following: Eczema (Referral from Dr. Mccollum for eczema. Currently managing flares with triamcinolone however, would like something stronger. Today patient has some flaring on inner arms, right upper chest, and on posterior thighs.).          Review of Systems:  No other skin or systemic complaints other than what is documented elsewhere in the note.    The following portions of the chart were reviewed this encounter and updated as appropriate:          Skin Cancer History  Biopsy Log Book  No skin cancers from Specimen Tracking.    Additional History      Specialty Problems          Dermatology Problems    Eczema    Acne        Objective   Well appearing patient in no apparent distress; mood and affect are within normal limits.    A focused skin examination was performed. All findings within normal limits unless otherwise noted below.    Assessment/Plan   Skin Exam  1. OTHER ATOPIC DERMATITIS  Left Antecubital Fossa, Left Thigh - Anterior, Neck - Anterior, Right Antecubital Fossa, Right Thigh - Anterior  Erythematous scaly papules and plaques with overyling excoriation located symmetrically in the antecubital fossae, right shoulder, bilateral thighs  The chronic and intermittently flaring nature of this skin condition was discussed. Advised that this occurs due to a genetic defect in the skin barrier resulting in increased loss of water from skin resulting in dry, itchy, inflammed skin. Atopic dermatitis treatment goal is to restore the skin barrier.    We discussed a gentle skin care regimen including washing with a mild soap without fragrance such as dove for sensitive skin, cetaphil or cerave. Pat dry after washing and then apply a thick moisturizer such as vaseline, Aquaphor, Cerave cream or Cetaphil cream.  Reapplication multiple times per day may be necessary.     Failed topical triamcinolone.     When the skin has flared patient to  apply clobetasol 0.05% cream to affected areas on body 2x daily x 2 weeks, then one week off, and repeat as needed. Side effects of topical steroids were reviewed including risk of skin atrophy  This Visit  - clobetasol (Temovate) 0.05 % cream - Apply topically 2 times a day. 14 days on, 7 days off. Repeat as needed for rash.

## 2025-08-08 ENCOUNTER — APPOINTMENT (OUTPATIENT)
Dept: NUTRITION | Facility: CLINIC | Age: 25
End: 2025-08-08
Payer: COMMERCIAL

## 2025-08-22 ENCOUNTER — APPOINTMENT (OUTPATIENT)
Dept: NUTRITION | Facility: CLINIC | Age: 25
End: 2025-08-22
Payer: COMMERCIAL

## 2025-12-15 ENCOUNTER — APPOINTMENT (OUTPATIENT)
Dept: DERMATOLOGY | Facility: CLINIC | Age: 25
End: 2025-12-15
Payer: COMMERCIAL

## 2026-01-26 ENCOUNTER — APPOINTMENT (OUTPATIENT)
Facility: CLINIC | Age: 26
End: 2026-01-26
Payer: COMMERCIAL

## (undated) DEVICE — DRAPE, INSTRUMENT, W/POUCH, STERI DRAPE, 9 5/8 X 18 LONG

## (undated) DEVICE — TUBING, OUTFLOW, DRAIN PIPE, W/ONE WAY VALVE (FMS VUE)

## (undated) DEVICE — CANNULA, 7 X 110

## (undated) DEVICE — SUTURE, ETHILON, 3-0, 18 IN, PS1, BLACK

## (undated) DEVICE — SUTURE TAPE, XBRAID, 1.4MM BLACK/WHITE

## (undated) DEVICE — GLOVE, SURGICAL, PROTEXIS PI BLUE W/NEUTHERA, 7.0, PF, LF

## (undated) DEVICE — FORCE FIBER P2,  BLUE CO-BRSAID, 38IN STRAND

## (undated) DEVICE — DRILL, ICONIX, 1.4MM, DISPOSABLE

## (undated) DEVICE — BLADE, SAMURAI CURVED, FULL RADIUS

## (undated) DEVICE — MAT, FLOOR, SURGISAFE, FLUID CONTROL, 46X40

## (undated) DEVICE — NEEDLE, HYPODERMIC, REGULAR WALL, REGULAR BEVEL, 22 G X 1.5 IN

## (undated) DEVICE — SUTURE MANAGER, NANOPASSER, CRESCENT

## (undated) DEVICE — KIT, HIP FRACTURES AND SCOPES, CUSTOM, AHUJA

## (undated) DEVICE — ENTRY KIT, PORTAL

## (undated) DEVICE — DRAPE, C-ARM, W/12 IN COVER, LI XTRAY TUBE

## (undated) DEVICE — Device

## (undated) DEVICE — BLADE, GREAT WHITE CONCAVE, 4.2MM, PREBENT

## (undated) DEVICE — ARTHROWAND, MULTIVAC 50XL, ICW

## (undated) DEVICE — CANNULA, FLOWPORT II, WITH OBTURATOR

## (undated) DEVICE — BLADE, CURVED HIP, SHARP TIP, NS

## (undated) DEVICE — KIT, DISPOSABLE, FOR 2.8 Q-FIX SHOULDER

## (undated) DEVICE — GLOVE, SURGICAL, PROTEXIS PI W/NEU-THERA, 8.5, PF, LF

## (undated) DEVICE — DRAPE, ISOLATION, ANTIMICROBIAL, W/POUCH, IOBAN 2, STERI DRAPE, 125 X 83 IN, DISPOSABLE, STERILE

## (undated) DEVICE — NEEDLE, TAPERED, W/ NITIONAL LOOP, T-5, 1/2 CIRCLE, 26.5MM LONG

## (undated) DEVICE — KIT, HIP POSTFREE, MEDIUM, GUARDIAN

## (undated) DEVICE — TUBING, NFLOW, FMS VUE, SNGL USE, DISP, LF

## (undated) DEVICE — SYRINGE, 10 CC, LUER LOCK

## (undated) DEVICE — BUR, SPHERICAL, 5.5MM, PREBENT

## (undated) DEVICE — GLOVE, SURGICAL, PROTEXIS PI MICRO, 8.0, PF, LF

## (undated) DEVICE — ADAPTER, Y TUBING STERILE

## (undated) DEVICE — SLINGSHOT, 45 DEG UP

## (undated) DEVICE — SUTURE, VICRYL, 0, 27 IN, CT-1, UNDYED